# Patient Record
Sex: FEMALE | Race: BLACK OR AFRICAN AMERICAN | NOT HISPANIC OR LATINO | Employment: UNEMPLOYED | URBAN - METROPOLITAN AREA
[De-identification: names, ages, dates, MRNs, and addresses within clinical notes are randomized per-mention and may not be internally consistent; named-entity substitution may affect disease eponyms.]

---

## 2018-05-10 ENCOUNTER — HOSPITAL ENCOUNTER (EMERGENCY)
Facility: HOSPITAL | Age: 33
Discharge: HOME | End: 2018-05-10
Attending: EMERGENCY MEDICINE

## 2018-05-10 VITALS
TEMPERATURE: 98 F | HEIGHT: 66 IN | DIASTOLIC BLOOD PRESSURE: 83 MMHG | WEIGHT: 150 LBS | HEART RATE: 56 BPM | RESPIRATION RATE: 16 BRPM | BODY MASS INDEX: 24.11 KG/M2 | OXYGEN SATURATION: 100 % | SYSTOLIC BLOOD PRESSURE: 128 MMHG

## 2018-05-10 DIAGNOSIS — G44.209 ACUTE NON INTRACTABLE TENSION-TYPE HEADACHE: Primary | ICD-10-CM

## 2018-05-10 LAB
B-HCG UR QL: NEGATIVE
BACTERIA #/AREA URNS HPF: ABNORMAL /HPF
BILIRUB UR QL STRIP: ABNORMAL
CLARITY UR: CLEAR
COLOR UR: YELLOW
GLUCOSE UR QL STRIP: NEGATIVE
HGB UR QL STRIP: NEGATIVE
HYALINE CASTS #/AREA URNS LPF: 0 /LPF
KETONES UR QL STRIP: ABNORMAL
LEUKOCYTE ESTERASE UR QL STRIP: NEGATIVE
MICROSCOPIC COMMENT: ABNORMAL
NITRITE UR QL STRIP: NEGATIVE
PH UR STRIP: 7 [PH] (ref 5–8)
PROT UR QL STRIP: ABNORMAL
RBC #/AREA URNS HPF: 1 /HPF (ref 0–4)
SP GR UR STRIP: 1.02 (ref 1–1.03)
SQUAMOUS #/AREA URNS HPF: 20 /HPF
URN SPEC COLLECT METH UR: ABNORMAL
UROBILINOGEN UR STRIP-ACNC: NEGATIVE EU/DL
WBC #/AREA URNS HPF: 2 /HPF (ref 0–5)

## 2018-05-10 PROCEDURE — 96375 TX/PRO/DX INJ NEW DRUG ADDON: CPT

## 2018-05-10 PROCEDURE — 99284 EMERGENCY DEPT VISIT MOD MDM: CPT | Mod: 25

## 2018-05-10 PROCEDURE — 81025 URINE PREGNANCY TEST: CPT

## 2018-05-10 PROCEDURE — 96374 THER/PROPH/DIAG INJ IV PUSH: CPT

## 2018-05-10 PROCEDURE — 63600175 PHARM REV CODE 636 W HCPCS: Performed by: EMERGENCY MEDICINE

## 2018-05-10 PROCEDURE — 81000 URINALYSIS NONAUTO W/SCOPE: CPT

## 2018-05-10 RX ORDER — ONDANSETRON 2 MG/ML
4 INJECTION INTRAMUSCULAR; INTRAVENOUS
Status: DISCONTINUED | OUTPATIENT
Start: 2018-05-10 | End: 2018-05-10

## 2018-05-10 RX ORDER — DEXAMETHASONE SODIUM PHOSPHATE 100 MG/10ML
10 INJECTION INTRAMUSCULAR; INTRAVENOUS
Status: COMPLETED | OUTPATIENT
Start: 2018-05-10 | End: 2018-05-10

## 2018-05-10 RX ORDER — KETOROLAC TROMETHAMINE 30 MG/ML
30 INJECTION, SOLUTION INTRAMUSCULAR; INTRAVENOUS
Status: COMPLETED | OUTPATIENT
Start: 2018-05-10 | End: 2018-05-10

## 2018-05-10 RX ORDER — PROMETHAZINE HYDROCHLORIDE 25 MG/1
25 TABLET ORAL EVERY 6 HOURS PRN
Qty: 8 TABLET | Refills: 0 | Status: ON HOLD | OUTPATIENT
Start: 2018-05-10 | End: 2019-10-17 | Stop reason: CLARIF

## 2018-05-10 RX ORDER — NAPROXEN 500 MG/1
500 TABLET ORAL 2 TIMES DAILY WITH MEALS
Qty: 12 TABLET | Refills: 0 | Status: ON HOLD | OUTPATIENT
Start: 2018-05-10 | End: 2019-10-17 | Stop reason: CLARIF

## 2018-05-10 RX ORDER — METHOCARBAMOL 500 MG/1
1000 TABLET, FILM COATED ORAL 4 TIMES DAILY
Qty: 12 TABLET | Refills: 0 | Status: SHIPPED | OUTPATIENT
Start: 2018-05-10 | End: 2018-05-13

## 2018-05-10 RX ADMIN — DEXAMETHASONE SODIUM PHOSPHATE 10 MG: 10 INJECTION INTRAMUSCULAR; INTRAVENOUS at 09:05

## 2018-05-10 RX ADMIN — KETOROLAC TROMETHAMINE 30 MG: 30 INJECTION, SOLUTION INTRAMUSCULAR; INTRAVENOUS at 09:05

## 2018-05-11 NOTE — ED TRIAGE NOTES
Pt reports migraine and vomiting began 2-3 hours ago. Pt denies ever having a headache or migraine before.

## 2018-05-11 NOTE — DISCHARGE INSTRUCTIONS
Neck rehab  Naprosyn 500 one twice daily with meals  Robaxin 500 take two every 6 hours  Phenergan for nausea  Follow up PCP as needed.

## 2018-05-11 NOTE — ED NOTES
Pt c/o migraine and vomiting that began 2-3 hours ago. Pt denies any history of migraines/headaches or any recent injury. Pt reports last vomiting episode PTA. Pt updated on plan of care. Warm blankets provided. No needs voiced at this time. Will continue to monitor.

## 2018-05-11 NOTE — ED NOTES
Pt stated she does not have an allergy to Zofran, but that she was told by another physician that she has an adverse reaction to the medication and it makes her vomit. Pt aware a urine sample is needed.

## 2018-05-12 NOTE — ED PROVIDER NOTES
Encounter Date: 5/10/2018       History     Chief Complaint   Patient presents with    Migraine    Vomiting     33 y female complains of acute headache >24 hours with assoc NV  No change in strength sensation mentation or gait  No Va or Vf changes  No fever  No neck stiffness but neck pain present at occiput.           Review of patient's allergies indicates:  No Known Allergies  History reviewed. No pertinent past medical history.  Past Surgical History:   Procedure Laterality Date     SECTION       History reviewed. No pertinent family history.  Social History   Substance Use Topics    Smoking status: Former Smoker    Smokeless tobacco: Never Used    Alcohol use No     Review of Systems   Constitutional: Negative.    HENT: Negative for facial swelling and sore throat.    Eyes: Negative for photophobia and visual disturbance.   Respiratory: Negative.    Cardiovascular: Negative.    Gastrointestinal: Negative.    Genitourinary: Negative for dysuria.   Musculoskeletal: Positive for neck pain. Negative for arthralgias, myalgias and neck stiffness.   Neurological: Positive for headaches. Negative for dizziness, tremors, seizures, syncope, facial asymmetry, speech difficulty, weakness, light-headedness and numbness.   Hematological: Negative.    Psychiatric/Behavioral: Negative for confusion.   All other systems reviewed and are negative.      Physical Exam     Initial Vitals [05/10/18 2041]   BP Pulse Resp Temp SpO2   129/87 70 18 98.4 °F (36.9 °C) 100 %      MAP       101         Physical Exam    Nursing note and vitals reviewed.  Constitutional: She appears well-developed and well-nourished. She is not diaphoretic. No distress.   HENT:   Head: Normocephalic and atraumatic.   Nose: Nose normal.   Mouth/Throat: Oropharynx is clear and moist. No oropharyngeal exudate.   Eyes: Conjunctivae and EOM are normal. Pupils are equal, round, and reactive to light. Right eye exhibits no discharge. Left eye exhibits  no discharge. No scleral icterus.   Neck: Normal range of motion. Neck supple. Muscular tenderness present. No edema, no erythema and normal range of motion present. No neck rigidity.       Cardiovascular: Normal rate, regular rhythm, normal heart sounds and intact distal pulses. Exam reveals no gallop and no friction rub.    No murmur heard.  Pulmonary/Chest: Breath sounds normal. No respiratory distress. She has no wheezes. She has no rhonchi. She has no rales.   Abdominal: Soft. Bowel sounds are normal. She exhibits no distension and no mass. There is no tenderness. There is no rebound and no guarding.   Musculoskeletal: Normal range of motion. She exhibits no edema or tenderness.   Lymphadenopathy:     She has no cervical adenopathy.   Neurological: She is alert and oriented to person, place, and time. She has normal strength. No cranial nerve deficit or sensory deficit.   Skin: Skin is warm and dry. Capillary refill takes less than 2 seconds. No rash noted. No erythema. No pallor.   Psychiatric: She has a normal mood and affect. Her behavior is normal. Judgment and thought content normal.         ED Course   Procedures  Labs Reviewed   URINALYSIS, REFLEX TO URINE CULTURE - Abnormal; Notable for the following:        Result Value    Protein, UA 1+ (*)     Ketones, UA 1+ (*)     Bilirubin (UA) 1+ (*)     All other components within normal limits    Narrative:     Preferred Collection Type->Urine, Clean Catch   URINALYSIS MICROSCOPIC - Abnormal; Notable for the following:     Bacteria, UA Few (*)     All other components within normal limits    Narrative:     Preferred Collection Type->Urine, Clean Catch   PREGNANCY TEST, URINE RAPID             Medical Decision Making:   Acute headache with no life threat characteristics and most consistent with tension  Improved with Toradol and Decadron  Stable to DC per AVS                       Clinical Impression:   The encounter diagnosis was Acute non intractable  tension-type headache.    Disposition:   Disposition: Discharged  Condition: Stable                        Roger Martinez MD  05/11/18 1953

## 2019-10-17 ENCOUNTER — HOSPITAL ENCOUNTER (INPATIENT)
Facility: HOSPITAL | Age: 34
LOS: 4 days | Discharge: PSYCHIATRIC OR SUBSTANCE USE FACILITY | End: 2019-10-21
Attending: HOSPITALIST | Admitting: HOSPITALIST
Payer: MEDICAID

## 2019-10-17 ENCOUNTER — HOSPITAL ENCOUNTER (EMERGENCY)
Facility: HOSPITAL | Age: 34
Discharge: OTHER INSTITUTION | End: 2019-10-17
Attending: EMERGENCY MEDICINE
Payer: MEDICAID

## 2019-10-17 VITALS
SYSTOLIC BLOOD PRESSURE: 144 MMHG | WEIGHT: 192 LBS | TEMPERATURE: 98 F | RESPIRATION RATE: 14 BRPM | DIASTOLIC BLOOD PRESSURE: 96 MMHG | HEART RATE: 111 BPM | HEIGHT: 66 IN | OXYGEN SATURATION: 100 % | BODY MASS INDEX: 30.86 KG/M2

## 2019-10-17 DIAGNOSIS — F15.10 AMPHETAMINE ABUSE: ICD-10-CM

## 2019-10-17 DIAGNOSIS — R00.0 TACHYCARDIA: ICD-10-CM

## 2019-10-17 DIAGNOSIS — R74.8 ELEVATED CPK: ICD-10-CM

## 2019-10-17 DIAGNOSIS — R44.0 AUDITORY HALLUCINATIONS: ICD-10-CM

## 2019-10-17 DIAGNOSIS — N17.9 ACUTE RENAL FAILURE, UNSPECIFIED ACUTE RENAL FAILURE TYPE: Primary | ICD-10-CM

## 2019-10-17 DIAGNOSIS — R44.1 VISUAL HALLUCINATIONS: ICD-10-CM

## 2019-10-17 DIAGNOSIS — N17.9 AKI (ACUTE KIDNEY INJURY): Primary | ICD-10-CM

## 2019-10-17 DIAGNOSIS — E87.6 HYPOKALEMIA: ICD-10-CM

## 2019-10-17 DIAGNOSIS — F11.10 OPIATE ABUSE, CONTINUOUS: ICD-10-CM

## 2019-10-17 LAB
ALBUMIN SERPL BCP-MCNC: 5.5 G/DL (ref 3.5–5.2)
ALP SERPL-CCNC: 88 U/L (ref 55–135)
ALT SERPL W/O P-5'-P-CCNC: 53 U/L (ref 10–44)
AMMONIA PLAS-SCNC: 19 UMOL/L (ref 10–50)
AMPHET+METHAMPHET UR QL: ABNORMAL
ANION GAP SERPL CALC-SCNC: 14 MMOL/L (ref 8–16)
ANION GAP SERPL CALC-SCNC: 22 MMOL/L (ref 8–16)
APAP SERPL-MCNC: <10 UG/ML (ref 10–20)
AST SERPL-CCNC: 50 U/L (ref 10–40)
B-HCG UR QL: NEGATIVE
BACTERIA #/AREA URNS HPF: ABNORMAL /HPF
BARBITURATES UR QL SCN>200 NG/ML: NEGATIVE
BASOPHILS # BLD AUTO: 0.02 K/UL (ref 0–0.2)
BASOPHILS NFR BLD: 0.2 % (ref 0–1.9)
BENZODIAZ UR QL SCN>200 NG/ML: NEGATIVE
BILIRUB SERPL-MCNC: 0.6 MG/DL (ref 0.1–1)
BILIRUB UR QL STRIP: ABNORMAL
BUN SERPL-MCNC: 21 MG/DL (ref 6–20)
BUN SERPL-MCNC: 26 MG/DL (ref 6–20)
BZE UR QL SCN: NEGATIVE
CALCIUM SERPL-MCNC: 9.6 MG/DL (ref 8.7–10.5)
CALCIUM SERPL-MCNC: 9.8 MG/DL (ref 8.7–10.5)
CANNABINOIDS UR QL SCN: NEGATIVE
CHLORIDE SERPL-SCNC: 105 MMOL/L (ref 95–110)
CHLORIDE SERPL-SCNC: 98 MMOL/L (ref 95–110)
CK SERPL-CCNC: 1017 U/L (ref 20–180)
CK SERPL-CCNC: 1171 U/L (ref 20–180)
CLARITY UR: ABNORMAL
CO2 SERPL-SCNC: 16 MMOL/L (ref 23–29)
CO2 SERPL-SCNC: 21 MMOL/L (ref 23–29)
COLOR UR: YELLOW
CREAT SERPL-MCNC: 1.8 MG/DL (ref 0.5–1.4)
CREAT SERPL-MCNC: 4.8 MG/DL (ref 0.5–1.4)
CREAT UR-MCNC: >400 MG/DL (ref 15–325)
DIFFERENTIAL METHOD: ABNORMAL
EOSINOPHIL # BLD AUTO: 0 K/UL (ref 0–0.5)
EOSINOPHIL NFR BLD: 0 % (ref 0–8)
ERYTHROCYTE [DISTWIDTH] IN BLOOD BY AUTOMATED COUNT: 11.9 % (ref 11.5–14.5)
EST. GFR  (AFRICAN AMERICAN): 12.6 ML/MIN/1.73 M^2
EST. GFR  (AFRICAN AMERICAN): 41 ML/MIN/1.73 M^2
EST. GFR  (NON AFRICAN AMERICAN): 10.9 ML/MIN/1.73 M^2
EST. GFR  (NON AFRICAN AMERICAN): 36 ML/MIN/1.73 M^2
ETHANOL SERPL-MCNC: <5 MG/DL
GLUCOSE SERPL-MCNC: 97 MG/DL (ref 70–110)
GLUCOSE SERPL-MCNC: 99 MG/DL (ref 70–110)
GLUCOSE UR QL STRIP: NEGATIVE
HCT VFR BLD AUTO: 43.2 % (ref 37–48.5)
HGB BLD-MCNC: 15.4 G/DL (ref 12–16)
HGB UR QL STRIP: ABNORMAL
HYALINE CASTS #/AREA URNS LPF: 0 /LPF
IMM GRANULOCYTES # BLD AUTO: 0.05 K/UL (ref 0–0.04)
IMM GRANULOCYTES NFR BLD AUTO: 0.4 % (ref 0–0.5)
KETONES UR QL STRIP: ABNORMAL
LEUKOCYTE ESTERASE UR QL STRIP: ABNORMAL
LYMPHOCYTES # BLD AUTO: 2.6 K/UL (ref 1–4.8)
LYMPHOCYTES NFR BLD: 20.9 % (ref 18–48)
MAGNESIUM SERPL-MCNC: 2 MG/DL (ref 1.6–2.6)
MCH RBC QN AUTO: 32.8 PG (ref 27–31)
MCHC RBC AUTO-ENTMCNC: 35.6 G/DL (ref 32–36)
MCV RBC AUTO: 92 FL (ref 82–98)
MICROSCOPIC COMMENT: ABNORMAL
MONOCYTES # BLD AUTO: 0.8 K/UL (ref 0.3–1)
MONOCYTES NFR BLD: 6.7 % (ref 4–15)
NEUTROPHILS # BLD AUTO: 8.8 K/UL (ref 1.8–7.7)
NEUTROPHILS NFR BLD: 71.8 % (ref 38–73)
NITRITE UR QL STRIP: NEGATIVE
NRBC BLD-RTO: 0 /100 WBC
OPIATES UR QL SCN: ABNORMAL
PCP UR QL SCN>25 NG/ML: NEGATIVE
PH UR STRIP: 6 [PH] (ref 5–8)
PLATELET # BLD AUTO: 207 K/UL (ref 150–350)
PMV BLD AUTO: 11.5 FL (ref 9.2–12.9)
POTASSIUM SERPL-SCNC: 2.7 MMOL/L (ref 3.5–5.1)
POTASSIUM SERPL-SCNC: 2.9 MMOL/L (ref 3.5–5.1)
PROT SERPL-MCNC: 10.2 G/DL (ref 6–8.4)
PROT UR QL STRIP: ABNORMAL
RBC # BLD AUTO: 4.69 M/UL (ref 4–5.4)
RBC #/AREA URNS HPF: 4 /HPF (ref 0–4)
SALICYLATES SERPL-MCNC: <4 MG/DL (ref 15–30)
SODIUM SERPL-SCNC: 136 MMOL/L (ref 136–145)
SODIUM SERPL-SCNC: 140 MMOL/L (ref 136–145)
SP GR UR STRIP: 1.02 (ref 1–1.03)
TOXICOLOGY INFORMATION: ABNORMAL
TRICHOMONAS UR QL MICRO: ABNORMAL
TSH SERPL DL<=0.005 MIU/L-ACNC: 0.4 UIU/ML (ref 0.34–5.6)
URATE SERPL-MCNC: 8.5 MG/DL (ref 2.4–5.7)
URN SPEC COLLECT METH UR: ABNORMAL
UROBILINOGEN UR STRIP-ACNC: NEGATIVE EU/DL
WBC # BLD AUTO: 14.72 K/UL (ref 3.9–12.7)
WBC #/AREA URNS HPF: 20 /HPF (ref 0–5)

## 2019-10-17 PROCEDURE — 63600175 PHARM REV CODE 636 W HCPCS: Performed by: EMERGENCY MEDICINE

## 2019-10-17 PROCEDURE — 25000003 PHARM REV CODE 250: Performed by: EMERGENCY MEDICINE

## 2019-10-17 PROCEDURE — 20000000 HC ICU ROOM

## 2019-10-17 PROCEDURE — 36415 COLL VENOUS BLD VENIPUNCTURE: CPT

## 2019-10-17 PROCEDURE — 80329 ANALGESICS NON-OPIOID 1 OR 2: CPT

## 2019-10-17 PROCEDURE — 93005 ELECTROCARDIOGRAM TRACING: CPT

## 2019-10-17 PROCEDURE — 63600175 PHARM REV CODE 636 W HCPCS: Performed by: HOSPITALIST

## 2019-10-17 PROCEDURE — 96374 THER/PROPH/DIAG INJ IV PUSH: CPT

## 2019-10-17 PROCEDURE — 86038 ANTINUCLEAR ANTIBODIES: CPT

## 2019-10-17 PROCEDURE — 25000003 PHARM REV CODE 250: Performed by: HOSPITALIST

## 2019-10-17 PROCEDURE — 70450 CT HEAD WITHOUT CONTRAST: ICD-10-PCS | Mod: 26,,, | Performed by: RADIOLOGY

## 2019-10-17 PROCEDURE — 80048 BASIC METABOLIC PNL TOTAL CA: CPT

## 2019-10-17 PROCEDURE — 82550 ASSAY OF CK (CPK): CPT

## 2019-10-17 PROCEDURE — 84550 ASSAY OF BLOOD/URIC ACID: CPT

## 2019-10-17 PROCEDURE — 74176 CT ABD & PELVIS W/O CONTRAST: CPT | Mod: TC

## 2019-10-17 PROCEDURE — 70450 CT HEAD/BRAIN W/O DYE: CPT | Mod: TC

## 2019-10-17 PROCEDURE — 63600175 PHARM REV CODE 636 W HCPCS: Performed by: NURSE PRACTITIONER

## 2019-10-17 PROCEDURE — 80307 DRUG TEST PRSMV CHEM ANLYZR: CPT

## 2019-10-17 PROCEDURE — 74176 CT ABD & PELVIS W/O CONTRAST: CPT | Mod: 26,,, | Performed by: RADIOLOGY

## 2019-10-17 PROCEDURE — 87086 URINE CULTURE/COLONY COUNT: CPT

## 2019-10-17 PROCEDURE — 86160 COMPLEMENT ANTIGEN: CPT | Mod: 59

## 2019-10-17 PROCEDURE — 99285 EMERGENCY DEPT VISIT HI MDM: CPT | Mod: 25

## 2019-10-17 PROCEDURE — 82550 ASSAY OF CK (CPK): CPT | Mod: 91

## 2019-10-17 PROCEDURE — 85025 COMPLETE CBC W/AUTO DIFF WBC: CPT

## 2019-10-17 PROCEDURE — 80320 DRUG SCREEN QUANTALCOHOLS: CPT

## 2019-10-17 PROCEDURE — 81000 URINALYSIS NONAUTO W/SCOPE: CPT | Mod: 59

## 2019-10-17 PROCEDURE — 86160 COMPLEMENT ANTIGEN: CPT

## 2019-10-17 PROCEDURE — 80053 COMPREHEN METABOLIC PANEL: CPT

## 2019-10-17 PROCEDURE — 83735 ASSAY OF MAGNESIUM: CPT

## 2019-10-17 PROCEDURE — 74176 CT ABDOMEN PELVIS WITHOUT CONTRAST: ICD-10-PCS | Mod: 26,,, | Performed by: RADIOLOGY

## 2019-10-17 PROCEDURE — 70450 CT HEAD/BRAIN W/O DYE: CPT | Mod: 26,,, | Performed by: RADIOLOGY

## 2019-10-17 PROCEDURE — 82140 ASSAY OF AMMONIA: CPT

## 2019-10-17 PROCEDURE — 84443 ASSAY THYROID STIM HORMONE: CPT

## 2019-10-17 PROCEDURE — 96361 HYDRATE IV INFUSION ADD-ON: CPT

## 2019-10-17 PROCEDURE — 25000003 PHARM REV CODE 250: Performed by: INTERNAL MEDICINE

## 2019-10-17 PROCEDURE — 81025 URINE PREGNANCY TEST: CPT

## 2019-10-17 RX ORDER — ACETAMINOPHEN 500 MG
1000 TABLET ORAL EVERY 8 HOURS PRN
Status: DISCONTINUED | OUTPATIENT
Start: 2019-10-17 | End: 2019-10-22 | Stop reason: HOSPADM

## 2019-10-17 RX ORDER — SODIUM BICARBONATE 1 MEQ/ML
50 SYRINGE (ML) INTRAVENOUS
Status: COMPLETED | OUTPATIENT
Start: 2019-10-17 | End: 2019-10-17

## 2019-10-17 RX ORDER — IPRATROPIUM BROMIDE AND ALBUTEROL SULFATE 2.5; .5 MG/3ML; MG/3ML
3 SOLUTION RESPIRATORY (INHALATION) EVERY 6 HOURS PRN
Status: DISCONTINUED | OUTPATIENT
Start: 2019-10-17 | End: 2019-10-22 | Stop reason: HOSPADM

## 2019-10-17 RX ORDER — POTASSIUM CHLORIDE 20 MEQ/1
40 TABLET, EXTENDED RELEASE ORAL ONCE
Status: COMPLETED | OUTPATIENT
Start: 2019-10-17 | End: 2019-10-17

## 2019-10-17 RX ORDER — AMOXICILLIN 250 MG
1 CAPSULE ORAL 2 TIMES DAILY
Status: DISCONTINUED | OUTPATIENT
Start: 2019-10-17 | End: 2019-10-22 | Stop reason: HOSPADM

## 2019-10-17 RX ORDER — ONDANSETRON 2 MG/ML
4 INJECTION INTRAMUSCULAR; INTRAVENOUS EVERY 8 HOURS PRN
Status: DISCONTINUED | OUTPATIENT
Start: 2019-10-17 | End: 2019-10-22 | Stop reason: HOSPADM

## 2019-10-17 RX ORDER — HALOPERIDOL 5 MG/ML
5 INJECTION INTRAMUSCULAR EVERY 6 HOURS PRN
Status: DISCONTINUED | OUTPATIENT
Start: 2019-10-17 | End: 2019-10-19

## 2019-10-17 RX ORDER — INDOMETHACIN 25 MG/1
CAPSULE ORAL
Status: DISCONTINUED
Start: 2019-10-17 | End: 2019-10-17 | Stop reason: HOSPADM

## 2019-10-17 RX ORDER — IBUPROFEN 200 MG
24 TABLET ORAL
Status: DISCONTINUED | OUTPATIENT
Start: 2019-10-17 | End: 2019-10-22 | Stop reason: HOSPADM

## 2019-10-17 RX ORDER — POTASSIUM CHLORIDE 20 MEQ/1
40 TABLET, EXTENDED RELEASE ORAL
Status: COMPLETED | OUTPATIENT
Start: 2019-10-17 | End: 2019-10-17

## 2019-10-17 RX ORDER — GLUCAGON 1 MG
1 KIT INJECTION
Status: DISCONTINUED | OUTPATIENT
Start: 2019-10-17 | End: 2019-10-22 | Stop reason: HOSPADM

## 2019-10-17 RX ORDER — IBUPROFEN 200 MG
16 TABLET ORAL
Status: DISCONTINUED | OUTPATIENT
Start: 2019-10-17 | End: 2019-10-22 | Stop reason: HOSPADM

## 2019-10-17 RX ADMIN — HALOPERIDOL LACTATE 5 MG: 5 INJECTION INTRAMUSCULAR at 10:10

## 2019-10-17 RX ADMIN — SODIUM CHLORIDE 1000 ML: 0.9 INJECTION, SOLUTION INTRAVENOUS at 09:10

## 2019-10-17 RX ADMIN — SODIUM BICARBONATE 50 MEQ: 84 INJECTION, SOLUTION INTRAVENOUS at 11:10

## 2019-10-17 RX ADMIN — POTASSIUM CHLORIDE 40 MEQ: 1500 TABLET, EXTENDED RELEASE ORAL at 10:10

## 2019-10-17 RX ADMIN — SODIUM CHLORIDE 1000 ML: 0.9 INJECTION, SOLUTION INTRAVENOUS at 11:10

## 2019-10-17 RX ADMIN — SODIUM BICARBONATE: 84 INJECTION, SOLUTION INTRAVENOUS at 09:10

## 2019-10-17 RX ADMIN — POTASSIUM CHLORIDE 40 MEQ: 1500 TABLET, EXTENDED RELEASE ORAL at 09:10

## 2019-10-17 NOTE — ED NOTES
"Initial assessment complete. Pt presents with reported visual and auditory hallucinations onset last night. Pt tearful on arrival and reports "no one believes me." Denies pain. Tachycardic on EKG monitor. Tearful and cooperative.   "

## 2019-10-17 NOTE — ED TRIAGE NOTES
"Pt reports that she was talking to a man in her room. Advised pt was alone. Pt tearful on arrival and reports "I have issues but never anything like this."   "

## 2019-10-17 NOTE — ED NOTES
Advised by EDP that pt will no longer be considered a 1:1 and will likely be admitted secondary to chemistry results but it is no longer deemed necessary to keep pt on observation in ED.

## 2019-10-17 NOTE — ED NOTES
Dr Trujillo spoke with Dr Moore about transfer. Will advise when an accepting facility is located.

## 2019-10-17 NOTE — ED NOTES
Call from transfer center. Pt accepted at [facility] room 512. Advised they will arrange transport. Pt updated on POC. Verbalized understanding.

## 2019-10-17 NOTE — PLAN OF CARE
(Physician in Lead of Transfers)   Outside Transfer Acceptance Note / Regional Referral Center    Transferring Physician: Ronna Moore MD    Accepting Physician: Kade Clark MD    Date of Acceptance: 10/17/19    Transferring Facility: [facility]    Destination Facility and Admitting Physician: [facility], Dr. Sheeder    Reason for Transfer: HLOC, needs nephrology    Report from Transferring Physician/Hospital course:     Mr. Kiran is a 34-year-old woman with no known medical history though she has several ED presentations for substance abuse complicated by hallucinations who presented to [facility] with confusion.    From Dr. Barriga description, obtaining a history from her is difficult due to her mental status. She is alert, but confused, and reporting both auditory and visual hallucinations that were thought to have started around 0800 Wednesday morning. This constellation of symptoms is reportedly similar to previous polysubstance-abuse episodes, though I cant see these in Epic.     In [place] she was tachycardic to the 120s, lab workup shows leukocytosis to 14.72k, hypokalemia to 2.7, anion-gap metabolic acidosis with bicarb 16 and gap 22, and JULIEN with BUN/sCr 26/4.8. CPK 1171. She thinks she may have taken heroin, methamphetamine, cocaine, or some mixture of the three. They have ordered urine studies, but she has not made any urine. She was given 2L NS, an amp of bicarb, and potassium chloride. She was placed on a 72h hold and admission was requested for JULIEN, but they do not have nephrology and are concerned she may need HD.    Of note, Dr. Moore has ordered a CT A/P non-con to evaluate her JULIEN, and I requested that while shes down there have a CT head as well.    VS:     See Epic    Labs:     See Epic    Radiographs:     See Epic    To Do List: Admit to     Upon patient arrival to floor, please contact Hospital Medicine on call.     Kade Clark M.D.   (Physician in Lead of  Transfers)  [facility]  [phone] (pager)

## 2019-10-17 NOTE — ED PROVIDER NOTES
"Encounter Date: 10/17/2019       History     Chief Complaint   Patient presents with    Hallucinations     35yo female with pmh cannabis/spice abuse presents to ED via EMS for evaluation of auditory and visual hallucinations. EMS states the patient called 911 frantic because "there are people in my house trying to get me. My sister and boyfriend do this stuff, I tried it, but they were going to attack me. My daughter let him in even though I said he couldn't come in. My momma doesn't want him there." She arrives tearful, hyperverbal, and speaking to a man (that isn't present). Voices "I may have done ice, heroin, cocaine."    Unable to obtain further history due to patient's medical condition.         Review of patient's allergies indicates:  No Known Allergies  History reviewed. No pertinent past medical history.  Past Surgical History:   Procedure Laterality Date     SECTION       History reviewed. No pertinent family history.  Social History     Tobacco Use    Smoking status: Former Smoker    Smokeless tobacco: Never Used   Substance Use Topics    Alcohol use: Not Currently    Drug use: Not Currently     Types: Marijuana, Methamphetamines     Review of Systems   Unable to perform ROS: Psychiatric disorder   Psychiatric/Behavioral: Positive for agitation and hallucinations.       Physical Exam     Initial Vitals [10/17/19 0837]   BP Pulse Resp Temp SpO2   (!) 130/90 (!) 129 18 98.3 °F (36.8 °C) 100 %      MAP       --         Physical Exam    Nursing note and vitals reviewed.  Constitutional: She appears well-developed and well-nourished. She is not diaphoretic. She appears distressed.   HENT:   Head: Normocephalic and atraumatic.   Right Ear: External ear normal.   Left Ear: External ear normal.   Nose: Nose normal.   Mouth/Throat: Oropharynx is clear and moist.   Eyes: Conjunctivae are normal. Pupils are equal, round, and reactive to light. No scleral icterus.   Neck: Normal range of motion. Neck " supple.   Cardiovascular: Regular rhythm, normal heart sounds and intact distal pulses. Tachycardia present.    Pulmonary/Chest: Breath sounds normal. No respiratory distress. She has no wheezes. She has no rhonchi. She exhibits no tenderness.   Abdominal: Soft. Bowel sounds are normal. She exhibits no distension. There is no tenderness. There is no guarding.   Musculoskeletal: Normal range of motion. She exhibits no edema or tenderness.   Lymphadenopathy:     She has no cervical adenopathy.   Neurological: She is alert and oriented to person, place, and time. GCS score is 15. GCS eye subscore is 4. GCS verbal subscore is 5. GCS motor subscore is 6.   Skin: Skin is warm and dry. Capillary refill takes less than 2 seconds. No rash noted. No erythema. No pallor.   Psychiatric: Her mood appears anxious. Her affect is labile and inappropriate. Her speech is rapid and/or pressured and tangential. She is agitated and hyperactive. Thought content is paranoid and delusional. Cognition and memory are impaired. She expresses impulsivity and inappropriate judgment.         ED Course   Critical Care  Date/Time: 10/17/2019 8:30 AM  Performed by: Ronna Moore MD  Authorized by: Ronna Moore MD   Direct patient critical care time: 145 minutes  Additional history critical care time: 20 minutes  Ordering / reviewing critical care time: 30 minutes  Documentation critical care time: 20 minutes  Consulting other physicians critical care time: 15 minutes  Total critical care time (exclusive of procedural time) : 230 minutes  Critical care time was exclusive of separately billable procedures and treating other patients and teaching time.  Critical care was necessary to treat or prevent imminent or life-threatening deterioration of the following conditions: renal failure, toxidrome and dehydration.  Critical care was time spent personally by me on the following activities: development of treatment plan with patient or surrogate,  interpretation of cardiac output measurements, examination of patient, ordering and performing treatments and interventions, ordering and review of radiographic studies, re-evaluation of patient's condition, review of old charts, discussions with consultants, evaluation of patient's response to treatment, obtaining history from patient or surrogate, ordering and review of laboratory studies and pulse oximetry.        Labs Reviewed   CBC W/ AUTO DIFFERENTIAL - Abnormal; Notable for the following components:       Result Value    WBC 14.72 (*)     Mean Corpuscular Hemoglobin 32.8 (*)     Gran # (ANC) 8.8 (*)     Immature Grans (Abs) 0.05 (*)     All other components within normal limits   COMPREHENSIVE METABOLIC PANEL - Abnormal; Notable for the following components:    Potassium 2.7 (*)     CO2 16 (*)     BUN, Bld 26 (*)     Creatinine 4.8 (*)     Total Protein 10.2 (*)     Albumin 5.5 (*)     AST 50 (*)     ALT 53 (*)     Anion Gap 22 (*)     eGFR if  12.6 (*)     eGFR if non  10.9 (*)     All other components within normal limits    Narrative:     Potassium critical result(s) called and verbal readback obtained from   JULIA Shukla, 10/17/2019 09:34   URINALYSIS, REFLEX TO URINE CULTURE - Abnormal; Notable for the following components:    Appearance, UA Cloudy (*)     Protein, UA 2+ (*)     Ketones, UA 1+ (*)     Bilirubin (UA) 2+ (*)     Occult Blood UA 2+ (*)     Leukocytes, UA 1+ (*)     All other components within normal limits    Narrative:     Preferred Collection Type->Urine, Clean Catch   DRUG SCREEN PANEL, URINE EMERGENCY - Abnormal; Notable for the following components:    Creatinine, Random Ur >400.0 (*)     All other components within normal limits    Narrative:     Preferred Collection Type->Urine, Clean Catch   SALICYLATE LEVEL - Abnormal; Notable for the following components:    Salicylate Lvl <4.0 (*)     All other components within normal limits   CK - Abnormal;  Notable for the following components:    CPK 1171 (*)     All other components within normal limits   URINALYSIS MICROSCOPIC - Abnormal; Notable for the following components:    WBC, UA 20 (*)     Bacteria Moderate (*)     Trichomonas, UA Few (*)     All other components within normal limits    Narrative:     Preferred Collection Type->Urine, Clean Catch   CULTURE, URINE   TSH   ALCOHOL,MEDICAL (ETHANOL)   ACETAMINOPHEN LEVEL   PREGNANCY TEST, URINE RAPID    Narrative:     Recoll. [id number] by SORAYA at 10/17/2019 11:49, reason: No Specimen   Received  Recoll. [id number] by DARRYL at 10/17/2019 12:09, reason: NO SPECIMEN   REC IN LAB   [facility]IA   CK     EKG Readings: (Independently Interpreted)   Initial Reading: No STEMI. Rhythm: Sinus Tachycardia. Heart Rate: 103. Ectopy: No Ectopy. Conduction: Normal. ST Segments: Normal ST Segments. T Waves: Normal. Axis: Normal. Clinical Impression: Sinus Tachycardia     ECG Results          EKG 12-lead (In process)  Result time 10/17/19 13:34:53    In process by Interface, Lab In [facility] (10/17/19 13:34:53)                 Narrative:    Test Reason : R00.0,    Vent. Rate : 103 BPM     Atrial Rate : 103 BPM     P-R Int : 128 ms          QRS Dur : 082 ms      QT Int : 358 ms       P-R-T Axes : 068 072 013 degrees     QTc Int : 468 ms    Sinus tachycardia  Otherwise normal ECG  No previous ECGs available    Referred By: [email or web address]   SELF           Confirmed By:                   In process by Interface, Lab In [facility] (10/17/19 13:31:52)                 Narrative:    Test Reason : R00.0,    Vent. Rate : 103 BPM     Atrial Rate : 103 BPM     P-R Int : 128 ms          QRS Dur : 082 ms      QT Int : 358 ms       P-R-T Axes : 068 072 013 degrees     QTc Int : 468 ms    Sinus tachycardia  Otherwise normal ECG  No previous ECGs available    Referred By: [email or web address]   SELF           Confirmed By:                             Imaging Results          CT Abdomen Pelvis  Without Contrast (Final  result)  Result time 10/17/19 13:53:18    Final result by Primitivo Alicea MD (10/17/19 13:53:18)                 Impression:      1. Previous cholecystectomy with mild extrahepatic biliary ductal dilatation.  2. No renal calculi.  3. Fluid filled but mild dilated loops of small bowel.  This can be seen with gastroenteritis.  4. Mild lumbar levoscoliosis.      Electronically signed by: Primitivo Alicea  Date:    10/17/2019  Time:    13:53             Narrative:    EXAMINATION:  CT ABDOMEN PELVIS WITHOUT CONTRAST    CLINICAL HISTORY:  Abd pain, fever, abscess suspected;    TECHNIQUE:  Low dose axial images, sagittal and coronal reformations were obtained from the lung bases to the pubic symphysis.    COMPARISON:  CT 01/30/2009.    FINDINGS:  The lung bases are clear.  No pleural or pericardial effusions.    The liver and spleen are normal in size and attenuation.  Surgical clips from previous cholecystectomy.  Mild extrahepatic biliary ductal dilatation.  The pancreas and adrenal glands are unremarkable.    Kidneys are normal in size and attenuation.  No renal calculi.  No changes of hydronephrosis.  No perinephric inflammatory change.    Air and stool throughout the colon.  No mesenteric inflammatory change.  There are fluid filled but nondilated loops of mid to distal small bowel.  This can be seen with gastroenteritis.  The appendix is normal in caliber.    Bladder is partially distended.  Uterus and ovaries are normal in size and attenuation.  Small amount of free fluid in cul-de-sac.  This is likely physiologic in etiology.    No significant mesenteric or retroperitoneal lymphadenopathy.    Mild lumbar levoscoliosis.                               CT Head Without Contrast (Final result)  Result time 10/17/19 13:46:36    Final result by Primitivo Alicea MD (10/17/19 13:46:36)                 Impression:      No acute intracranial abnormality.      Electronically signed by: Primitivo  Orange  Date:    10/17/2019  Time:    13:46             Narrative:    EXAMINATION:  CT HEAD WITHOUT CONTRAST    CLINICAL HISTORY:  Confusion/delirium, altered LOC, unexplained;    TECHNIQUE:  Low dose axial images were obtained through the head.  Coronal and sagittal reformations were also performed. Contrast was not administered.    COMPARISON:  CT 09/09/2013.    FINDINGS:  There is no acute hemorrhage or infarction.  Normal pattern of gray-white matter differentiation.    No extra-axial fluid collections.  Ventricles are normal in size, shape and configuration.  The basal cisterns are patent.    The imaged paranasal sinuses and ethmoid air cells are well aerated.    The mastoid air cells and middle ears are normally pneumatized.                              X-Rays:   Independently Interpreted Readings:   Head CT: No hemorrhage.  No skull fracture.  No acute stroke.     Medical Decision Making:   Differential Diagnosis:   Polysubstance abuse, acute renal failure, acute intracranial hemorrhage, metabolic encephalopathy, rhabdomyolysis, intoxication, acute psychosis  ED Management:  Pt in acute renal failure. IVFs and bicarb administered with very minimal urine yielded.   [facility] contacted to arrange transfer for nephrology services. Accepted at [facility], awaiting bed assignment.   Pt transferred to [facility] at 1959.                       Clinical Impression:       ICD-10-CM ICD-9-CM   1. Acute renal failure, unspecified acute renal failure type N17.9 584.9   2. Tachycardia R00.0 785.0   3. Elevated CPK R74.8 790.5   4. Hypokalemia E87.6 276.8   5. Auditory hallucinations R44.0 780.1   6. Visual hallucinations R44.1 368.16   7. Amphetamine abuse F15.10 305.70   8. Opiate abuse, continuous F11.10 305.51         Disposition:   Disposition: Transferred  Condition: Serious                        Ronna Moore MD  10/18/19 9802

## 2019-10-17 NOTE — ED NOTES
Pt complete approximately 75% of her breakfast tray. Her only request is to speak with her mother at this time.

## 2019-10-18 PROBLEM — F19.10 POLYSUBSTANCE ABUSE: Status: ACTIVE | Noted: 2019-10-18

## 2019-10-18 PROBLEM — E87.6 HYPOKALEMIA: Status: ACTIVE | Noted: 2019-10-18

## 2019-10-18 PROBLEM — F19.951 HALLUCINATION, DRUG-INDUCED: Status: ACTIVE | Noted: 2019-10-18

## 2019-10-18 PROBLEM — E87.20 METABOLIC ACIDOSIS: Status: ACTIVE | Noted: 2019-10-18

## 2019-10-18 PROBLEM — M62.82 NON-TRAUMATIC RHABDOMYOLYSIS: Status: ACTIVE | Noted: 2019-10-18

## 2019-10-18 PROBLEM — E83.39 HYPOPHOSPHATEMIA: Status: ACTIVE | Noted: 2019-10-18

## 2019-10-18 PROBLEM — K52.9 GASTROENTERITIS: Status: ACTIVE | Noted: 2019-10-18

## 2019-10-18 LAB
ANA SER QL IF: NORMAL
ANION GAP SERPL CALC-SCNC: 15 MMOL/L (ref 8–16)
BACTERIA #/AREA URNS HPF: ABNORMAL /HPF
BACTERIA UR CULT: NORMAL
BACTERIA UR CULT: NORMAL
BASOPHILS # BLD AUTO: 0.02 K/UL (ref 0–0.2)
BASOPHILS NFR BLD: 0.2 % (ref 0–1.9)
BILIRUB UR QL STRIP: NEGATIVE
BUN SERPL-MCNC: 16 MG/DL (ref 6–20)
C3 SERPL-MCNC: 161 MG/DL (ref 50–180)
C4 SERPL-MCNC: 42 MG/DL (ref 11–44)
CALCIUM SERPL-MCNC: 9.3 MG/DL (ref 8.7–10.5)
CHLORIDE SERPL-SCNC: 104 MMOL/L (ref 95–110)
CK SERPL-CCNC: 799 U/L (ref 20–180)
CLARITY UR: ABNORMAL
CO2 SERPL-SCNC: 21 MMOL/L (ref 23–29)
COLOR UR: YELLOW
CREAT SERPL-MCNC: 1.1 MG/DL (ref 0.5–1.4)
DIFFERENTIAL METHOD: ABNORMAL
EOSINOPHIL # BLD AUTO: 0 K/UL (ref 0–0.5)
EOSINOPHIL NFR BLD: 0.2 % (ref 0–8)
ERYTHROCYTE [DISTWIDTH] IN BLOOD BY AUTOMATED COUNT: 12.1 % (ref 11.5–14.5)
EST. GFR  (AFRICAN AMERICAN): >60 ML/MIN/1.73 M^2
EST. GFR  (NON AFRICAN AMERICAN): >60 ML/MIN/1.73 M^2
GLUCOSE SERPL-MCNC: 101 MG/DL (ref 70–110)
GLUCOSE UR QL STRIP: NEGATIVE
HCT VFR BLD AUTO: 33.7 % (ref 37–48.5)
HGB BLD-MCNC: 11.6 G/DL (ref 12–16)
HGB UR QL STRIP: ABNORMAL
HYALINE CASTS #/AREA URNS LPF: 0 /LPF
IMM GRANULOCYTES # BLD AUTO: 0.02 K/UL (ref 0–0.04)
KETONES UR QL STRIP: ABNORMAL
LEUKOCYTE ESTERASE UR QL STRIP: ABNORMAL
LYMPHOCYTES # BLD AUTO: 1.8 K/UL (ref 1–4.8)
LYMPHOCYTES NFR BLD: 19.6 % (ref 18–48)
MAGNESIUM SERPL-MCNC: 2 MG/DL (ref 1.6–2.6)
MCH RBC QN AUTO: 32.4 PG (ref 27–31)
MCHC RBC AUTO-ENTMCNC: 34.4 G/DL (ref 32–36)
MCV RBC AUTO: 94 FL (ref 82–98)
MICROSCOPIC COMMENT: ABNORMAL
MONOCYTES # BLD AUTO: 0.9 K/UL (ref 0.3–1)
MONOCYTES NFR BLD: 9.9 % (ref 4–15)
NEUTROPHILS # BLD AUTO: 6.4 K/UL (ref 1.8–7.7)
NEUTROPHILS NFR BLD: 69.9 % (ref 38–73)
NITRITE UR QL STRIP: NEGATIVE
NRBC BLD-RTO: 0 /100 WBC
PH UR STRIP: 7 [PH] (ref 5–8)
PHOSPHATE SERPL-MCNC: 1.9 MG/DL (ref 2.7–4.5)
PLATELET # BLD AUTO: 216 K/UL (ref 150–350)
PMV BLD AUTO: 11.4 FL (ref 9.2–12.9)
POTASSIUM SERPL-SCNC: 2.8 MMOL/L (ref 3.5–5.1)
PROT UR QL STRIP: ABNORMAL
RBC # BLD AUTO: 3.58 M/UL (ref 4–5.4)
RBC #/AREA URNS HPF: 3 /HPF (ref 0–4)
SODIUM SERPL-SCNC: 140 MMOL/L (ref 136–145)
SP GR UR STRIP: 1.02 (ref 1–1.03)
TRICHOMONAS UR QL MICRO: ABNORMAL
URN SPEC COLLECT METH UR: ABNORMAL
UROBILINOGEN UR STRIP-ACNC: NEGATIVE EU/DL
WBC # BLD AUTO: 9.22 K/UL (ref 3.9–12.7)
WBC #/AREA URNS HPF: 32 /HPF (ref 0–5)

## 2019-10-18 PROCEDURE — 80048 BASIC METABOLIC PNL TOTAL CA: CPT

## 2019-10-18 PROCEDURE — 85025 COMPLETE CBC W/AUTO DIFF WBC: CPT

## 2019-10-18 PROCEDURE — 87186 SC STD MICRODIL/AGAR DIL: CPT

## 2019-10-18 PROCEDURE — 94761 N-INVAS EAR/PLS OXIMETRY MLT: CPT

## 2019-10-18 PROCEDURE — 87077 CULTURE AEROBIC IDENTIFY: CPT

## 2019-10-18 PROCEDURE — 36415 COLL VENOUS BLD VENIPUNCTURE: CPT

## 2019-10-18 PROCEDURE — 63600175 PHARM REV CODE 636 W HCPCS: Performed by: HOSPITALIST

## 2019-10-18 PROCEDURE — 83735 ASSAY OF MAGNESIUM: CPT

## 2019-10-18 PROCEDURE — 25000003 PHARM REV CODE 250: Performed by: INTERNAL MEDICINE

## 2019-10-18 PROCEDURE — 84100 ASSAY OF PHOSPHORUS: CPT

## 2019-10-18 PROCEDURE — 25000003 PHARM REV CODE 250: Performed by: HOSPITALIST

## 2019-10-18 PROCEDURE — 87088 URINE BACTERIA CULTURE: CPT

## 2019-10-18 PROCEDURE — 87205 SMEAR GRAM STAIN: CPT

## 2019-10-18 PROCEDURE — 20000000 HC ICU ROOM

## 2019-10-18 PROCEDURE — 82550 ASSAY OF CK (CPK): CPT

## 2019-10-18 PROCEDURE — 81000 URINALYSIS NONAUTO W/SCOPE: CPT

## 2019-10-18 PROCEDURE — 63600175 PHARM REV CODE 636 W HCPCS: Performed by: NURSE PRACTITIONER

## 2019-10-18 PROCEDURE — 87086 URINE CULTURE/COLONY COUNT: CPT

## 2019-10-18 RX ORDER — SODIUM CHLORIDE 9 MG/ML
INJECTION, SOLUTION INTRAVENOUS CONTINUOUS
Status: DISCONTINUED | OUTPATIENT
Start: 2019-10-18 | End: 2019-10-19

## 2019-10-18 RX ORDER — POTASSIUM CHLORIDE 20 MEQ/15ML
40 SOLUTION ORAL ONCE
Status: COMPLETED | OUTPATIENT
Start: 2019-10-18 | End: 2019-10-18

## 2019-10-18 RX ORDER — SODIUM,POTASSIUM PHOSPHATES 280-250MG
1 POWDER IN PACKET (EA) ORAL 2 TIMES DAILY
Status: DISCONTINUED | OUTPATIENT
Start: 2019-10-18 | End: 2019-10-20

## 2019-10-18 RX ORDER — POTASSIUM CHLORIDE 20 MEQ/1
40 TABLET, EXTENDED RELEASE ORAL 2 TIMES DAILY
Status: DISCONTINUED | OUTPATIENT
Start: 2019-10-18 | End: 2019-10-22 | Stop reason: HOSPADM

## 2019-10-18 RX ADMIN — POTASSIUM CHLORIDE 40 MEQ: 20 TABLET, EXTENDED RELEASE ORAL at 06:10

## 2019-10-18 RX ADMIN — SODIUM BICARBONATE: 84 INJECTION, SOLUTION INTRAVENOUS at 08:10

## 2019-10-18 RX ADMIN — SODIUM CHLORIDE: 0.9 INJECTION, SOLUTION INTRAVENOUS at 06:10

## 2019-10-18 RX ADMIN — HALOPERIDOL LACTATE 5 MG: 5 INJECTION INTRAMUSCULAR at 08:10

## 2019-10-18 RX ADMIN — HALOPERIDOL LACTATE 5 MG: 5 INJECTION INTRAMUSCULAR at 07:10

## 2019-10-18 RX ADMIN — SODIUM CHLORIDE: 0.9 INJECTION, SOLUTION INTRAVENOUS at 10:10

## 2019-10-18 RX ADMIN — POTASSIUM & SODIUM PHOSPHATES POWDER PACK 280-160-250 MG 1 PACKET: 280-160-250 PACK at 12:10

## 2019-10-18 RX ADMIN — POTASSIUM CHLORIDE 40 MEQ: 20 SOLUTION ORAL at 07:10

## 2019-10-18 RX ADMIN — POTASSIUM & SODIUM PHOSPHATES POWDER PACK 280-160-250 MG 1 PACKET: 280-160-250 PACK at 08:10

## 2019-10-18 NOTE — ASSESSMENT & PLAN NOTE
Hx cannabis/spice abuse with admit hx of Ice, cocaine and heroin.  Discussed th deleterious effects of substance abuse.

## 2019-10-18 NOTE — ASSESSMENT & PLAN NOTE
Continue IVF hydration.  Follow renal panel and electrolytes closely.  Follow renal recommendations.  Check Renal Ultrasound.  Adjust renal dose medications for Estimated Creatinine Clearance: 46.5 mL/min (A) (based on SCr of 1.8 mg/dL (H)).  Avoid NSAIDs, Reddy-II inhibitors, ACE-I, Angiotensin Receptor Blockers, or Aminoglycosides.  Urine analysis.

## 2019-10-18 NOTE — CONSULTS
Nephrology Consult Note        Patient Name: Eleno Kiran  MRN: [id number]    Patient Class: IP- Inpatient   Admission Date: 10/17/2019  Length of Stay: 1 days  Date of Service: 10/18/2019    Attending Physician: Manda Guadarrama MD  Primary Care Provider: Gerhard Sequeira III, MD    Reason for Consult: julien/anemia/acidosis/hypokalemia/encephalopathy/hypophosphatemia    SUBJECTIVE:     HPI: 34F transferred from [facility] with anemia, JULIEN with oliguria, hypokalemia, acidosis requiring bicarb gtt, in setting of overdose with heroin, cocaine, amphetamines. Continues to have visual and auditory hallucinations. Renal consulted for co-management.    History reviewed. No pertinent past medical history.  Past Surgical History:   Procedure Laterality Date     SECTION       Family History   Family history unknown: Yes     Social History     Tobacco Use    Smoking status: Former Smoker    Smokeless tobacco: Never Used   Substance Use Topics    Alcohol use: Not Currently    Drug use: Yes     Types: Marijuana, Methamphetamines, Amphetamines       Review of patient's allergies indicates:  No Known Allergies    Outpatient meds:  Current Facility-Administered Medications on File Prior to Encounter   Medication Dose Route Frequency Provider Last Rate Last Dose    [COMPLETED] sodium chloride 0.9% bolus 1,000 mL  1,000 mL Intravenous ED 1 Time Ronna Moore MD   Stopped at 10/17/19 1941    [DISCONTINUED] sodium bicarbonate 1 mEq/mL (8.4 %) solution              No current outpatient medications on file prior to encounter.       Scheduled meds:   potassium, sodium phosphates  1 packet Oral BID    senna-docusate 8.6-50 mg  1 tablet Oral BID       Infusions:   sodium chloride 0.9% 150 mL/hr at 10/18/19 1035       PRN meds:  acetaminophen, albuterol-ipratropium, dextrose 50%, dextrose 50%, glucagon (human recombinant), glucose, glucose, haloperidol lactate, ondansetron, promethazine (PHENERGAN) IVPB    Review of  Systems:  Review of Systems   Unable to perform ROS: Acuity of condition       OBJECTIVE:     Vital Signs and IO (Last 24H):  Temp:  [98 °F (36.7 °C)-98.6 °F (37 °C)]   Pulse:  []   Resp:  [14-84]   BP: ()/(59-97)   SpO2:  [90 %-100 %]   I/O last 3 completed shifts:  In: 786.7 [I.V.:786.7]  Out: 200 [Urine:200]    Wt Readings from Last 5 Encounters:   10/17/19 81.2 kg (179 lb 0.2 oz)   10/17/19 87.1 kg (192 lb)   10/17/19 87.1 kg (192 lb 0.3 oz)   05/10/18 68 kg (150 lb)   11/09/16 70.3 kg (155 lb)         Physical Exam:  Physical Exam   Constitutional: She appears well-developed and well-nourished.   HENT:   Head: Normocephalic and atraumatic.   Mouth/Throat: Oropharynx is clear and moist.   Eyes: Pupils are equal, round, and reactive to light. EOM are normal. No scleral icterus.   Neck: Neck supple.   Cardiovascular: Normal rate and regular rhythm.   Pulmonary/Chest: Effort normal. No stridor. No respiratory distress.   Abdominal: Soft. She exhibits no distension.   Musculoskeletal: Normal range of motion. She exhibits no edema or deformity.   Neurological: She is alert. No cranial nerve deficit.   Skin: Skin is warm and dry. No rash noted. She is not diaphoretic. No erythema.   Psychiatric: She has a normal mood and affect. Her behavior is normal.       Body mass index is 28.89 kg/m².    Laboratory:  Recent Labs   Lab 10/17/19  0902 10/17/19  2115 10/18/19  0410    140 140   K 2.7* 2.9* 2.8*   CL 98 105 104   CO2 16* 21* 21*   BUN 26* 21* 16   CREATININE 4.8* 1.8* 1.1   ESTGFRAFRICA 12.6* 41* >60   EGFRNONAA 10.9* 36* >60   GLU 99 97 101       Recent Labs   Lab 10/17/19  0902 10/17/19  2115 10/18/19  0410   CALCIUM 9.8 9.6 9.3   ALBUMIN 5.5*  --   --    PHOS  --   --  1.9*   MG  --  2.0 2.0             No results for input(s): POCTGLUCOSE in the last 168 hours.          Recent Labs   Lab 10/17/19  0902 10/18/19  0410   WBC 14.72* 9.22   HGB 15.4 11.6*   HCT 43.2 33.7*    216   MCV 92 94    MCHC 35.6 34.4   MONO 6.7  0.8 9.9  0.9       Recent Labs   Lab 10/17/19  0902   BILITOT 0.6   PROT 10.2*   ALBUMIN 5.5*   ALKPHOS 88   ALT 53*   AST 50*       Recent Labs   Lab 05/10/18  2227 10/17/19  1110   Color, UA Yellow Yellow   Appearance, UA Clear Cloudy A   pH, UA 7.0 6.0   Specific [place], UA 1.025 1.025   Protein, UA 1+ A 2+ A   Glucose, UA Negative Negative   Ketones, UA 1+ A 1+ A   Urobilinogen, UA Negative Negative   Bilirubin (UA) 1+ A 2+ A   Occult Blood UA Negative 2+ A   Nitrite, UA Negative Negative   RBC, UA 1 4   WBC, UA 2 20 H   Bacteria Few A Moderate A   Hyaline Casts, UA 0 0             Microbiology Results (last 7 days)     ** No results found for the last 168 hours. **          ASSESSMENT/PLAN:     Active Hospital Problems    Diagnosis  POA    *JULIEN (acute kidney injury) [N17.9]  Yes    Metabolic acidosis [E87.2]  Yes    Hallucination, drug-induced [F19.951]  Yes    Polysubstance abuse [F19.10]  Yes    Non-traumatic rhabdomyolysis [M62.82]  Yes      Resolved Hospital Problems   No resolved problems to display.     JULIEN, likely hemodynamic, better with IVF.  CKD stage 3, baseline sCr < 1.  Acidosis, metabolic, better.  Hypokalemia, better but still present.  Encephalopathy, hallucinations.  Hypophosphatemia.  No NSAIDs, ACEI/ARB, IV contrast or other nephrotoxins.  Keep MAP > 60, SBP > 100.  Continue oral KCL.  Continue oral Phos supplement.  Stop bicarb gtt and monitor.    Anemia  Hgb and HCT are acceptable. Monitor.    Thank you for allowing us to participate in the care of your patient!   We will follow the patient and provide recommendations as needed.    Pee Jesus MD    [facility]  [address] [place] [address]  ELLE Jay [address]    [phone] - tel  [phone] - fax    10/18/2019 4:48 PM

## 2019-10-18 NOTE — HPI
Eleno Kiran is a 35 y/o F who was transferred from [facility] to [facility] with a diagnosis of acute kidney injury.  Pt is alert, but confused, and reporting both auditory and visual hallucinations that were thought to have started around 0800 Wednesday morning after use of ice, heroin and/or cocaine.  Pt was noted to have metabolic acidosis with an anion gap of 22.  She was placed on a bicarb drip but has failed to produce urine.  She was sent to [facility] for the service of nephrology.  Further history is difficult to obtain as pt remains confused and continues to have hallucinations.

## 2019-10-18 NOTE — NURSING
Spoke with Dr Jesus via phone. He is aware of the patient. No further orders except for magnesium lab added. Will monitor at this time.

## 2019-10-18 NOTE — H&P
[facility] Hospital Medicine  History & Physical    Patient Name: Eleno Kiran  MRN: [id number]  Admission Date: 10/17/2019  Attending Physician: Manda Guadarrama MD   Primary Care Provider: Gerhard Sequeira III, MD         Patient information was obtained from patient, past medical records and ER records.     Subjective:     Principal Problem:JULIEN (acute kidney injury)    Chief Complaint: No chief complaint on file.       HPI: Eleno Kiran is a 33 y/o F who was transferred from [facility] to [facility] with a diagnosis of acute kidney injury.  Pt is alert, but confused, and reporting both auditory and visual hallucinations that were thought to have started around 0800 Wednesday morning after use of ice, heroin and/or cocaine.  Pt was noted to have metabolic acidosis with an anion gap of 22.  She was placed on a bicarb drip but has failed to produce urine.  She was sent to [facility] for the service of nephrology.  Further history is difficult to obtain as pt remains confused and continues to have hallucinations.    History reviewed. No pertinent past medical history.    Past Surgical History:   Procedure Laterality Date     SECTION         Review of patient's allergies indicates:  No Known Allergies    Current Facility-Administered Medications on File Prior to Encounter   Medication    [COMPLETED] potassium chloride SA CR tablet 40 mEq    [COMPLETED] sodium bicarbonate 8.4 % (1 mEq/mL) injection 50 mEq    [COMPLETED] sodium chloride 0.9% bolus 1,000 mL    [COMPLETED] sodium chloride 0.9% bolus 1,000 mL    [DISCONTINUED] sodium bicarbonate 1 mEq/mL (8.4 %) solution     No current outpatient medications on file prior to encounter.     Family History     Family history is unknown by patient.        Tobacco Use    Smoking status: Former Smoker    Smokeless tobacco: Never Used   Substance and Sexual Activity    Alcohol use: Not Currently    Drug use: Yes     Types:  Marijuana, Methamphetamines, Amphetamines    Sexual activity: Not Currently     Review of Systems   Unable to perform ROS: Mental status change     Objective:     Vital Signs (Most Recent):  Temp: 98.3 °F (36.8 °C) (10/17/19 2343)  Pulse: (!) 116 (10/17/19 2343)  Resp: (!) 31 (10/17/19 2343)  BP: 124/69 (10/17/19 2300)  SpO2: 100 % (10/17/19 2343) Vital Signs (24h Range):  Temp:  [98.2 °F (36.8 °C)-98.6 °F (37 °C)] 98.3 °F (36.8 °C)  Pulse:  [103-129] 116  Resp:  [14-70] 31  SpO2:  [100 %] 100 %  BP: (119-155)/() 124/69     Weight: 81.2 kg (179 lb 0.2 oz)  Body mass index is 28.89 kg/m².    Physical Exam   Constitutional: She appears well-developed and well-nourished.   HENT:   Head: Normocephalic and atraumatic.   Eyes: Pupils are equal, round, and reactive to light. Conjunctivae and EOM are normal.   Neck: Normal range of motion. Neck supple. No JVD present.   Cardiovascular: Regular rhythm. Tachycardia present.   Pulmonary/Chest: Effort normal and breath sounds normal. No respiratory distress.   Abdominal: Soft. Bowel sounds are normal. She exhibits no distension. There is no tenderness.   Genitourinary:   Genitourinary Comments: Deferred     Musculoskeletal: Normal range of motion. She exhibits no edema.   Neurological: She is alert. GCS eye subscore is 4. GCS verbal subscore is 4. GCS motor subscore is 6.   Skin: Skin is warm and dry. No rash noted.   Psychiatric: Her mood appears anxious. Her affect is labile. Her speech is rapid and/or pressured. She is agitated and hyperactive. Cognition and memory are impaired. She expresses impulsivity.   Nursing note and vitals reviewed.        CRANIAL NERVES     CN III, IV, VI   Pupils are equal, round, and reactive to light.  Extraocular motions are normal.        Significant Labs:   CBC:   Recent Labs   Lab 10/17/19  0902   WBC 14.72*   HGB 15.4   HCT 43.2        CMP:   Recent Labs   Lab 10/17/19  0902 10/17/19  2115    140   K 2.7* 2.9*   CL 98 105    CO2 16* 21*   GLU 99 97   BUN 26* 21*   CREATININE 4.8* 1.8*   CALCIUM 9.8 9.6   PROT 10.2*  --    ALBUMIN 5.5*  --    BILITOT 0.6  --    ALKPHOS 88  --    AST 50*  --    ALT 53*  --    ANIONGAP 22* 14   EGFRNONAA 10.9* 36*     Cardiac Markers: No results for input(s): CKMB, MYOGLOBIN, BNP, TROPISTAT in the last 48 hours.    Significant Imaging: CT head:  no intracranial abnormality   CT abdomen/pelvis:  1. Previous cholecystectomy with mild extrahepatic biliary ductal dilatation.  2. No renal calculi.  3. Fluid filled but mild dilated loops of small bowel.  This can be seen with gastroenteritis.  4. Mild lumbar levoscoliosis.    Assessment/Plan:     * JULIEN (acute kidney injury)  Continue IVF hydration.  Follow renal panel and electrolytes closely.  Follow renal recommendations.  Check Renal Ultrasound.  Adjust renal dose medications for Estimated Creatinine Clearance: 46.5 mL/min (A) (based on SCr of 1.8 mg/dL (H)).  Avoid NSAIDs, Reddy-II inhibitors, ACE-I, Angiotensin Receptor Blockers, or Aminoglycosides.  Urine analysis.                Non-traumatic rhabdomyolysis  IVF  Check CPK in am      Polysubstance abuse  Hx cannabis/spice abuse with admit hx of Ice, cocaine and heroin.  Discussed th deleterious effects of substance abuse.        Hallucination, drug-induced  Pt currently PEC'd.  Auditory and visual hallucinations following possible use of ICE, heroin and cocaine.  Monitor for s/s of withdrawal.        Metabolic acidosis  anion-gap metabolic acidosis with bicarb 16 and gap 22, placed on bicarb drip and having minimal urine output.  Nephrology consulted.  Monitor I&O's  BMP, Mg, phos, cbc daily.        VTE Risk Mitigation (From admission, onward)         Ordered     IP VTE HIGH RISK PATIENT  Once      10/17/19 2054     Place ANNALISE hose  Until discontinued      10/17/19 2054     Place sequential compression device  Until discontinued      10/17/19 2054              Critical care time spent on the evaluation and  treatment of severe organ dysfunction, review of pertinent labs and imaging studies, discussions with consulting providers and discussions with patient/family: 40  minutes.     SHIRA Bourne  Department of Hospital Medicine   [facility]

## 2019-10-18 NOTE — ASSESSMENT & PLAN NOTE
anion-gap metabolic acidosis with bicarb 16 and gap 22, placed on bicarb drip and having minimal urine output.  Nephrology consulted.  Monitor I&O's  BMP, Mg, phos, cbc daily.

## 2019-10-18 NOTE — PLAN OF CARE
Remained at bedside 1:1. Patient continues to have visual and auditory hallucinations. PRN Haldol helping somewhat. Labs monitored, potassium replaced per orders. Bicarb gtt. Voided once. Inadvertently removed IV and new IV started. Safety maintained.
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
## 2019-10-18 NOTE — ED NOTES
Called placed to [facility]. Spoke with Zohra. [facility] set up transfer as a priority 3. Transfer changed to priority two.

## 2019-10-18 NOTE — NURSING
Called Hanh Roberts NP to notify of patient's arrival and of need for PEC to be filled out. Informed me that she cannot fill out PEC, contacted ER and spoke with Dr Mayberry regarding PEC.

## 2019-10-18 NOTE — ASSESSMENT & PLAN NOTE
Pt currently PEC'd.  Auditory and visual hallucinations following possible use of ICE, heroin and cocaine.  Monitor for s/s of withdrawal.

## 2019-10-18 NOTE — SUBJECTIVE & OBJECTIVE
Interval History:   Patient seen examined.  She is awake.  She had some agitation which improved with Haldol.  She reports depression and prior hospitalizations x5 in psychiatric facilities.  She denies current SI/HI      Review of Systems   Constitutional: Negative for chills and fever.   Respiratory: Negative for cough and chest tightness.    Cardiovascular: Negative for chest pain and leg swelling.   Gastrointestinal: Negative for abdominal pain.   Genitourinary: Negative for difficulty urinating and dysuria.   Musculoskeletal: Negative for arthralgias and back pain.   Psychiatric/Behavioral: Positive for behavioral problems and hallucinations. Negative for suicidal ideas. The patient is nervous/anxious.      Objective:     Vital Signs (Most Recent):  Temp: 98 °F (36.7 °C) (10/18/19 1200)  Pulse: 84 (10/18/19 1542)  Resp: (!) 32 (10/18/19 1542)  BP: 115/71 (10/18/19 1400)  SpO2: 95 % (10/18/19 1542) Vital Signs (24h Range):  Temp:  [98 °F (36.7 °C)-98.6 °F (37 °C)] 98 °F (36.7 °C)  Pulse:  [] 84  Resp:  [14-84] 32  SpO2:  [90 %-100 %] 95 %  BP: ()/(59-97) 115/71     Weight: 81.2 kg (179 lb 0.2 oz)  Body mass index is 28.89 kg/m².    Intake/Output Summary (Last 24 hours) at 10/18/2019 1754  Last data filed at 10/18/2019 1200  Gross per 24 hour   Intake 1506.67 ml   Output 600 ml   Net 906.67 ml      Physical Exam   Constitutional: She is oriented to person, place, and time. She appears well-developed and well-nourished. No distress.   HENT:   Head: Normocephalic and atraumatic.   Nose: Nose normal.   Mouth/Throat: Oropharynx is clear and moist.   Eyes: Conjunctivae are normal. No scleral icterus.   Neck: Neck supple. No JVD present.   Cardiovascular: Normal rate and regular rhythm.   Pulmonary/Chest: Effort normal and breath sounds normal. No respiratory distress.   Abdominal: Soft. Bowel sounds are normal. She exhibits no distension. There is no tenderness.   Genitourinary:   Genitourinary Comments:  deferred     Musculoskeletal: Normal range of motion. She exhibits no edema.   Lymphadenopathy:     She has no cervical adenopathy.   Neurological: She is alert and oriented to person, place, and time. GCS eye subscore is 4. GCS verbal subscore is 4. GCS motor subscore is 6.   Skin: Skin is warm and dry. Capillary refill takes less than 2 seconds. No rash noted.   Psychiatric: Her mood appears anxious. Her affect is not labile. Her speech is rapid and/or pressured. She is not agitated and not hyperactive. Thought content is paranoid. Cognition and memory are impaired. She expresses impulsivity. She exhibits a depressed mood. She expresses no homicidal and no suicidal ideation. She expresses no suicidal plans and no homicidal plans.   Nursing note and vitals reviewed.      Significant Labs:   BMP:   Recent Labs   Lab 10/18/19  0410         K 2.8*      CO2 21*   BUN 16   CREATININE 1.1   CALCIUM 9.3   MG 2.0     CBC:   Recent Labs   Lab 10/17/19  0902 10/18/19  0410   WBC 14.72* 9.22   HGB 15.4 11.6*   HCT 43.2 33.7*    216       Significant Imaging: I have reviewed and interpreted all pertinent imaging results/findings within the past 24 hours.   1. Previous cholecystectomy with mild extrahepatic biliary ductal dilatation.  2. No renal calculi.  3. Fluid filled but mild dilated loops of small bowel.  This can be seen with gastroenteritis.  4. Mild lumbar levoscoliosis.

## 2019-10-18 NOTE — PLAN OF CARE
"CM called pt's mother Nyla ([phone]) to complete discharge assessment due to pt unable to do at this time (pt having auditory/visually hallucinations). Nyla confirmed information on facesheet as correct. Nyla states that pt bounces between her listed address and her mother's address ([address]  in [place]). Nyla reports that pt is normally independent with all ADLs and can drive herself to appts. PCP is Dr. Sequeira. Pharm is [facility] in [place]. Denies any hh/hd/dme. Pt's mother is requesting pysh placement because this isn't pt's first "episode". CM explained to Nyla that we have to wait on psychiatrist recommendations. Nyla verbalized understanding. CM following to assist in any DC needs.        10/18/19 1126   Discharge Assessment   Assessment Type Discharge Planning Assessment   Assessment information obtained from? Other  (mother (Nyla [phone]))   Communicated expected length of stay with patient/caregiver yes   Prior to hospitilization cognitive status: Alert/Oriented   Prior to hospitalization functional status: Independent   Current cognitive status: Inappropriate Behavior   Current Functional Status: Needs Assistance   Lives With parent(s)   Able to Return to Prior Arrangements no   Is patient able to care for self after discharge? Unable to determine at this time (comments)   Readmission Within the Last 30 Days no previous admission in last 30 days   Patient currently being followed by outpatient case management? No   Patient currently receives any other outside agency services? No   Equipment Currently Used at Home none   Do you have any problems affording any of your prescribed medications? No   Is the patient taking medications as prescribed? yes   Does the patient have transportation home? No   Does the patient receive services at the [facility]? No   Discharge Plan A Psychiatric hospital   Discharge Plan B Home   DME Needed Upon Discharge  none "   Patient/Family in Agreement with Plan yes

## 2019-10-18 NOTE — SUBJECTIVE & OBJECTIVE
History reviewed. No pertinent past medical history.    Past Surgical History:   Procedure Laterality Date     SECTION         Review of patient's allergies indicates:  No Known Allergies    Current Facility-Administered Medications on File Prior to Encounter   Medication    [COMPLETED] potassium chloride SA CR tablet 40 mEq    [COMPLETED] sodium bicarbonate 8.4 % (1 mEq/mL) injection 50 mEq    [COMPLETED] sodium chloride 0.9% bolus 1,000 mL    [COMPLETED] sodium chloride 0.9% bolus 1,000 mL    [DISCONTINUED] sodium bicarbonate 1 mEq/mL (8.4 %) solution     No current outpatient medications on file prior to encounter.     Family History     Family history is unknown by patient.        Tobacco Use    Smoking status: Former Smoker    Smokeless tobacco: Never Used   Substance and Sexual Activity    Alcohol use: Not Currently    Drug use: Yes     Types: Marijuana, Methamphetamines, Amphetamines    Sexual activity: Not Currently     Review of Systems   Unable to perform ROS: Mental status change     Objective:     Vital Signs (Most Recent):  Temp: 98.3 °F (36.8 °C) (10/17/19 2343)  Pulse: (!) 116 (10/17/19 2343)  Resp: (!) 31 (10/17/19 2343)  BP: 124/69 (10/17/19 2300)  SpO2: 100 % (10/17/19 2343) Vital Signs (24h Range):  Temp:  [98.2 °F (36.8 °C)-98.6 °F (37 °C)] 98.3 °F (36.8 °C)  Pulse:  [103-129] 116  Resp:  [14-70] 31  SpO2:  [100 %] 100 %  BP: (119-155)/() 124/69     Weight: 81.2 kg (179 lb 0.2 oz)  Body mass index is 28.89 kg/m².    Physical Exam   Constitutional: She appears well-developed and well-nourished.   HENT:   Head: Normocephalic and atraumatic.   Eyes: Pupils are equal, round, and reactive to light. Conjunctivae and EOM are normal.   Neck: Normal range of motion. Neck supple. No JVD present.   Cardiovascular: Regular rhythm. Tachycardia present.   Pulmonary/Chest: Effort normal and breath sounds normal. No respiratory distress.   Abdominal: Soft. Bowel sounds are normal. She  exhibits no distension. There is no tenderness.   Genitourinary:   Genitourinary Comments: Deferred     Musculoskeletal: Normal range of motion. She exhibits no edema.   Neurological: She is alert. GCS eye subscore is 4. GCS verbal subscore is 4. GCS motor subscore is 6.   Skin: Skin is warm and dry. No rash noted.   Psychiatric: Her mood appears anxious. Her affect is labile. Her speech is rapid and/or pressured. She is agitated and hyperactive. Cognition and memory are impaired. She expresses impulsivity.   Nursing note and vitals reviewed.        CRANIAL NERVES     CN III, IV, VI   Pupils are equal, round, and reactive to light.  Extraocular motions are normal.        Significant Labs:   CBC:   Recent Labs   Lab 10/17/19  0902   WBC 14.72*   HGB 15.4   HCT 43.2        CMP:   Recent Labs   Lab 10/17/19  0902 10/17/19  2115    140   K 2.7* 2.9*   CL 98 105   CO2 16* 21*   GLU 99 97   BUN 26* 21*   CREATININE 4.8* 1.8*   CALCIUM 9.8 9.6   PROT 10.2*  --    ALBUMIN 5.5*  --    BILITOT 0.6  --    ALKPHOS 88  --    AST 50*  --    ALT 53*  --    ANIONGAP 22* 14   EGFRNONAA 10.9* 36*     Cardiac Markers: No results for input(s): CKMB, MYOGLOBIN, BNP, TROPISTAT in the last 48 hours.    Significant Imaging: CT head:  no intracranial abnormality   CT abdomen/pelvis:  1. Previous cholecystectomy with mild extrahepatic biliary ductal dilatation.  2. No renal calculi.  3. Fluid filled but mild dilated loops of small bowel.  This can be seen with gastroenteritis.  4. Mild lumbar levoscoliosis.
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
## 2019-10-19 PROBLEM — A59.01 TRICHOMONAS VAGINITIS: Status: ACTIVE | Noted: 2019-10-19

## 2019-10-19 PROBLEM — G93.41 ENCEPHALOPATHY, METABOLIC: Status: ACTIVE | Noted: 2019-10-19

## 2019-10-19 LAB
ANION GAP SERPL CALC-SCNC: 9 MMOL/L (ref 8–16)
BASOPHILS # BLD AUTO: 0.02 K/UL (ref 0–0.2)
BASOPHILS NFR BLD: 0.3 % (ref 0–1.9)
BUN SERPL-MCNC: 7 MG/DL (ref 6–20)
CALCIUM SERPL-MCNC: 8.3 MG/DL (ref 8.7–10.5)
CHLORIDE SERPL-SCNC: 109 MMOL/L (ref 95–110)
CK SERPL-CCNC: 298 U/L (ref 20–180)
CO2 SERPL-SCNC: 24 MMOL/L (ref 23–29)
CREAT SERPL-MCNC: 0.7 MG/DL (ref 0.5–1.4)
DIFFERENTIAL METHOD: ABNORMAL
EOSINOPHIL # BLD AUTO: 0 K/UL (ref 0–0.5)
EOSINOPHIL NFR BLD: 0.5 % (ref 0–8)
EOSINOPHIL URNS QL WRIGHT STN: NORMAL
ERYTHROCYTE [DISTWIDTH] IN BLOOD BY AUTOMATED COUNT: 12.6 % (ref 11.5–14.5)
EST. GFR  (AFRICAN AMERICAN): >60 ML/MIN/1.73 M^2
EST. GFR  (NON AFRICAN AMERICAN): >60 ML/MIN/1.73 M^2
GLUCOSE SERPL-MCNC: 102 MG/DL (ref 70–110)
HCT VFR BLD AUTO: 28.8 % (ref 37–48.5)
HGB BLD-MCNC: 9.8 G/DL (ref 12–16)
IMM GRANULOCYTES # BLD AUTO: 0.02 K/UL (ref 0–0.04)
LYMPHOCYTES # BLD AUTO: 2.1 K/UL (ref 1–4.8)
LYMPHOCYTES NFR BLD: 28.7 % (ref 18–48)
MAGNESIUM SERPL-MCNC: 1.9 MG/DL (ref 1.6–2.6)
MCH RBC QN AUTO: 32.6 PG (ref 27–31)
MCHC RBC AUTO-ENTMCNC: 34 G/DL (ref 32–36)
MCV RBC AUTO: 96 FL (ref 82–98)
MONOCYTES # BLD AUTO: 0.8 K/UL (ref 0.3–1)
MONOCYTES NFR BLD: 10.6 % (ref 4–15)
NEUTROPHILS # BLD AUTO: 4.4 K/UL (ref 1.8–7.7)
NEUTROPHILS NFR BLD: 59.6 % (ref 38–73)
NRBC BLD-RTO: 0 /100 WBC
PHOSPHATE SERPL-MCNC: 1.2 MG/DL (ref 2.7–4.5)
PLATELET # BLD AUTO: 184 K/UL (ref 150–350)
PMV BLD AUTO: 11 FL (ref 9.2–12.9)
POTASSIUM SERPL-SCNC: 3.2 MMOL/L (ref 3.5–5.1)
RBC # BLD AUTO: 3.01 M/UL (ref 4–5.4)
SODIUM SERPL-SCNC: 142 MMOL/L (ref 136–145)
WBC # BLD AUTO: 7.39 K/UL (ref 3.9–12.7)

## 2019-10-19 PROCEDURE — 63600175 PHARM REV CODE 636 W HCPCS: Performed by: PSYCHIATRY & NEUROLOGY

## 2019-10-19 PROCEDURE — 25000003 PHARM REV CODE 250: Performed by: HOSPITALIST

## 2019-10-19 PROCEDURE — 36415 COLL VENOUS BLD VENIPUNCTURE: CPT

## 2019-10-19 PROCEDURE — 80048 BASIC METABOLIC PNL TOTAL CA: CPT

## 2019-10-19 PROCEDURE — 84100 ASSAY OF PHOSPHORUS: CPT

## 2019-10-19 PROCEDURE — 83735 ASSAY OF MAGNESIUM: CPT

## 2019-10-19 PROCEDURE — 94761 N-INVAS EAR/PLS OXIMETRY MLT: CPT

## 2019-10-19 PROCEDURE — 20000000 HC ICU ROOM

## 2019-10-19 PROCEDURE — 63600175 PHARM REV CODE 636 W HCPCS: Performed by: NURSE PRACTITIONER

## 2019-10-19 PROCEDURE — 25000003 PHARM REV CODE 250: Performed by: INTERNAL MEDICINE

## 2019-10-19 PROCEDURE — 85025 COMPLETE CBC W/AUTO DIFF WBC: CPT

## 2019-10-19 PROCEDURE — 82550 ASSAY OF CK (CPK): CPT

## 2019-10-19 PROCEDURE — 99900035 HC TECH TIME PER 15 MIN (STAT)

## 2019-10-19 RX ORDER — HALOPERIDOL 5 MG/ML
5 INJECTION INTRAMUSCULAR EVERY 8 HOURS
Status: DISCONTINUED | OUTPATIENT
Start: 2019-10-19 | End: 2019-10-22 | Stop reason: HOSPADM

## 2019-10-19 RX ORDER — CLONIDINE HYDROCHLORIDE 0.1 MG/1
0.1 TABLET ORAL EVERY 8 HOURS PRN
Status: DISCONTINUED | OUTPATIENT
Start: 2019-10-19 | End: 2019-10-22 | Stop reason: HOSPADM

## 2019-10-19 RX ORDER — HYDROCORTISONE 25 MG/G
CREAM TOPICAL 2 TIMES DAILY
Status: DISCONTINUED | OUTPATIENT
Start: 2019-10-19 | End: 2019-10-19

## 2019-10-19 RX ORDER — METRONIDAZOLE 500 MG/1
2000 TABLET ORAL ONCE
Status: COMPLETED | OUTPATIENT
Start: 2019-10-19 | End: 2019-10-19

## 2019-10-19 RX ORDER — HYDROCORTISONE 25 MG/G
CREAM TOPICAL 3 TIMES DAILY
Status: DISCONTINUED | OUTPATIENT
Start: 2019-10-19 | End: 2019-10-22 | Stop reason: HOSPADM

## 2019-10-19 RX ADMIN — FLUCONAZOLE 150 MG: 50 TABLET ORAL at 01:10

## 2019-10-19 RX ADMIN — HALOPERIDOL LACTATE 5 MG: 5 INJECTION INTRAMUSCULAR at 12:10

## 2019-10-19 RX ADMIN — POTASSIUM CHLORIDE 40 MEQ: 20 TABLET, EXTENDED RELEASE ORAL at 09:10

## 2019-10-19 RX ADMIN — POTASSIUM & SODIUM PHOSPHATES POWDER PACK 280-160-250 MG 1 PACKET: 280-160-250 PACK at 08:10

## 2019-10-19 RX ADMIN — HALOPERIDOL LACTATE 5 MG: 5 INJECTION, SOLUTION INTRAMUSCULAR at 09:10

## 2019-10-19 RX ADMIN — METRONIDAZOLE 2000 MG: 500 TABLET, FILM COATED ORAL at 09:10

## 2019-10-19 RX ADMIN — POTASSIUM & SODIUM PHOSPHATES POWDER PACK 280-160-250 MG 1 PACKET: 280-160-250 PACK at 09:10

## 2019-10-19 RX ADMIN — HYDROCORTISONE: 25 CREAM TOPICAL at 01:10

## 2019-10-19 RX ADMIN — POTASSIUM CHLORIDE 40 MEQ: 20 TABLET, EXTENDED RELEASE ORAL at 08:10

## 2019-10-19 NOTE — ASSESSMENT & PLAN NOTE
Pt currently PEC'd.  Auditory and visual hallucinations following possible use of ICE, heroin and cocaine.  Monitor for s/s of withdrawal.  Denies Si/HI  Psychiatry consulted --await eval   Hallucinations improved with  haldol prn   Pt reports hx depression/inpatient psychiatric treatment-

## 2019-10-19 NOTE — SUBJECTIVE & OBJECTIVE
Interval History:   Pt seen/examined-nurse present   No hallucinations today -had haldol once overnight  Depressed affect but pleasant   C/o vag itch/burn     Review of Systems   Constitutional: Negative for chills and fever.   Respiratory: Negative for cough and shortness of breath.    Gastrointestinal: Negative for abdominal pain, diarrhea, nausea and vomiting.   Genitourinary: Positive for dysuria and vaginal discharge.   Psychiatric/Behavioral: Positive for dysphoric mood. Negative for suicidal ideas.        Objective:     Vital Signs (Most Recent):  Temp: 98.3 °F (36.8 °C) (10/19/19 0805)  Pulse: 82 (10/19/19 0830)  Resp: 20 (10/19/19 0830)  BP: 121/74 (10/19/19 0805)  SpO2: 100 % (10/19/19 0830) Vital Signs (24h Range):  Temp:  [97.4 °F (36.3 °C)-98.3 °F (36.8 °C)] 98.3 °F (36.8 °C)  Pulse:  [] 82  Resp:  [18-76] 20  SpO2:  [72 %-100 %] 100 %  BP: (104-126)/(56-92) 121/74     Weight: 81.2 kg (179 lb 0.2 oz)  Body mass index is 28.89 kg/m².    Intake/Output Summary (Last 24 hours) at 10/19/2019 1111  Last data filed at 10/19/2019 0900  Gross per 24 hour   Intake 3712.5 ml   Output 400 ml   Net 3312.5 ml      Physical Exam   Constitutional: She is oriented to person, place, and time. She appears well-developed and well-nourished. No distress.   HENT:   Head: Normocephalic and atraumatic.   Nose: Nose normal.   Mouth/Throat: Oropharynx is clear and moist.   Eyes: Conjunctivae are normal. No scleral icterus.   Neck: Neck supple. No JVD present.   Cardiovascular: Normal rate and regular rhythm.   Pulmonary/Chest: Effort normal and breath sounds normal. No respiratory distress.   Abdominal: Soft. Bowel sounds are normal. She exhibits no distension. There is no tenderness.   Genitourinary:   Genitourinary Comments: deferred     Musculoskeletal: Normal range of motion. She exhibits no edema.   Lymphadenopathy:     She has no cervical adenopathy.   Neurological: She is alert and oriented to person, place, and  time. GCS eye subscore is 4. GCS verbal subscore is 4. GCS motor subscore is 6.   Skin: Skin is warm and dry. Capillary refill takes less than 2 seconds. No rash noted.   Psychiatric: Her mood appears not anxious. Her speech is not rapid and/or pressured. She is not agitated and not hyperactive. Thought content is paranoid. Cognition and memory are not impaired. She expresses impulsivity. She exhibits a depressed mood. She expresses no homicidal and no suicidal ideation. She expresses no suicidal plans and no homicidal plans.   Nursing note and vitals reviewed.      Significant Labs:   Urine Studies:   Recent Labs   Lab 10/18/19  1202   COLORU Yellow   APPEARANCEUA Hazy*   PHUR 7.0   SPECGRAV 1.025   PROTEINUA 1+*   GLUCUA Negative   KETONESU 1+*   BILIRUBINUA Negative   OCCULTUA 3+*   NITRITE Negative   UROBILINOGEN Negative   LEUKOCYTESUR 2+*   RBCUA 3   WBCUA 32*   BACTERIA Many*   HYALINECASTS 0      2d ago   Urine Culture, Routine Multiple organisms isolated. None in predominance.  Repeat if    Urine Culture, Routine clinically necessary.    Results for ELLIOTT CARTER (MRN [id number]) as of 10/19/2019 11:13   Ref. Range 10/18/2019 04:10 10/19/2019 03:54   Sodium Latest Ref Range: 136 - 145 mmol/L 140 142   Potassium Latest Ref Range: 3.5 - 5.1 mmol/L 2.8 (L) 3.2 (L)   Chloride Latest Ref Range: 95 - 110 mmol/L 104 109   CO2 Latest Ref Range: 23 - 29 mmol/L 21 (L) 24   Anion Gap Latest Ref Range: 8 - 16 mmol/L 15 9   BUN, Bld Latest Ref Range: 6 - 20 mg/dL 16 7   Creatinine Latest Ref Range: 0.5 - 1.4 mg/dL 1.1 0.7   eGFR if non African American Latest Ref Range: >60 mL/min/1.73 m^2 >60 >60   eGFR if  Latest Ref Range: >60 mL/min/1.73 m^2 >60 >60   Glucose Latest Ref Range: 70 - 110 mg/dL 101 102   Calcium Latest Ref Range: 8.7 - 10.5 mg/dL 9.3 8.3 (L)   Phosphorus Latest Ref Range: 2.7 - 4.5 mg/dL 1.9 (L) 1.2 (L)   Magnesium Latest Ref Range: 1.6 - 2.6 mg/dL 2.0 1.9   CPK Latest Ref  Range: 20 - 180 U/L 799 (H) 298 (H)       Significant Imaging: I have reviewed and interpreted all pertinent imaging results/findings within the past 24 hours.

## 2019-10-19 NOTE — ASSESSMENT & PLAN NOTE
Acute --resolved after bicarb gtt .fand ivf -likely 2/2 GE, dehydration, drugs and rhabdo  Follow renal panel and electrolytes closely.  Ctap-no obstructon .  Adjust renal dose medications for Estimated Creatinine Clearance: 119.4 mL/min (based on SCr of 0.7 mg/dL).  Avoid NSAIDs, Reddy-II inhibitors, ACE-I, Angiotensin Receptor Blockers, or Aminoglycosides.  Urine analysis.-mild proteinuria /wbc/trichomonas --treat std   Urine culture neg

## 2019-10-19 NOTE — CONSULTS
HISTORY OF PRESENT ILLNESS:  The patient is a 34-year-old -American   female whose mailing address is [id number].  She   is admitted to Intensive Care Unit of [facility] after she was transferred   from [facility].  Psychiatric evaluation is called in because of the   patient's altered mental status.  The patient states that her mother has been   taking care of her father who was hospitalized at some facility, the name of   which she does not quite recall, and she had been living at her parents' house   and realized that her sister along with her boyfriend were living there as well,   using all kinds of drugs.  She asked them to leave, which they will not.  The   patient called the police and police did not find anyone at the premises.    However, she called the police three to four time times because slowly they were   bringing their friends into the house and asked her to go buy some more drugs   for them.  The patient states that they were talking with each other and telling   that in her sleep they may want to kill her and to her, the commands were to   leave the house.  Being afraid for her life, the patient states that she did not   sleep for three nights, was not eating, and in the end the police brought her   to the hospital and [facility].  The   patient does admit to having crying spells.  Poor appetite.  Does have mood   swings, but denies any rage or temper related issues.    PAST PSYCHIATRIC HISTORY:  Positive.  The patient has history of suicide   attempts in the past and required psychiatric hospitalization.  Had been   admitted to various facilities five times so far.  She does give history of drug   abuse, which was the reason for her divorce and lost custody of her children.    FAMILY HISTORY:  Questionable.    SOCIAL HISTORY:  The patient's parents are both in their 60s.  She states that   she has one sister.   She is the oldest.  Finished high school when she was 15,   has been  once, has four children, the oldest being 15 and the youngest   being 2.  She denies alcohol or tobacco use that she quit smoking sometime back.    She did admit to using marijuana and did say that she had a history of various   use of drugs.    PAST MEDICAL AND SURGICAL HISTORY:  The patient had at the time of admission   acute kidney insult.    ALLERGIES:  None known.    CURRENT PSYCHOTROPICS:  None.    MENTAL STATUS EXAMINATION:  A 34-year-old -American female who was   sitting in bed, crying profusely because she has been missing her family.  The   patient is sad, tearful.  Affect is labile.  She is suspicious, paranoid   ideations, having both auditory and visual hallucinations, command in nature.    She is alert and cooperative.  Knows today is Saturday, 10/19/2019.  Her serial   sevens are poor, 100 minus 7 is 80.  Recent memory is 3 out of 4 after 1 minute   and 2 out of 4 after 5 minutes.  Remote memory seems fair.  Her insight and   judgment is markedly impaired.    IMPRESSION:  AXIS I:  Schizoaffective disorder along with psychosis secondary to street drug   use.  Urine drug screen is positive for amphetamine and opioids.    RECOMMENDATIONS:  Haldol 5 mg IM q. 8 hours, Catapres 0.1 mg one p.o. q. 8 hours   p.r.n. if blood pressure above 140/90.  The patient needs psychiatric inpatient   treatment.  Referred the patient to the appropriate psychiatric facility with   the assistance from .      NIES  dd: 10/19/2019 12:09:46 (CDT)  td: 10/19/2019 17:53:09 (CDT)  Doc ID   #[id number]  Job ID #[id number]    CC:

## 2019-10-19 NOTE — ASSESSMENT & PLAN NOTE
Patient with encephalopathy due to jennifer, substance abuse /mental illness . Treatment for illness causative of encephalopathy is under way. Encephalopathy noted as a secondary process related to the patient's acute illness. There is no specific treatment. Monitor neuro status, avoid medications such as narcotics and benzos which may worsen confusion, and use anti-psychotics to prevent behaviors of self harm.  Ct head neg

## 2019-10-19 NOTE — PLAN OF CARE
Patient has remained calm and cooperative throughout shift. Denies hallucinations. Sitter has remained at bedside for safety. Ambulated to restroom to void. No falls this shift safety maintained.

## 2019-10-19 NOTE — PSYCH
Note dictated      Dc Haldol  Haldol 5 mg IM q 8 hrs  Catapres 0.1 mg po q 8 hrs prn if B/P if blood pressure over 140/90    Inpatient psych facility when medically stable    Thank you

## 2019-10-19 NOTE — ASSESSMENT & PLAN NOTE
Acute --resolved after bicarb gtt .  Follow renal panel and electrolytes closely.  Ctap-no obstructon .  Adjust renal dose medications for Estimated Creatinine Clearance: 76 mL/min (based on SCr of 1.1 mg/dL).  Avoid NSAIDs, Reddy-II inhibitors, ACE-I, Angiotensin Receptor Blockers, or Aminoglycosides.  Urine analysis.-mild proteinuria /wbc/trichomonas --treat std

## 2019-10-19 NOTE — HOSPITAL COURSE
34-year-old female admitted for acute kidney injury after polysubstance abuse, dehydration from gastroenteritis, and rhabdomyolysis.  Renal function returned to normal after IV fluids with bicarb drip.  She had no further nausea vomiting or diarrhea.  Her electrolytes were monitored and replaced.  Her CPK level trended down.  She was able to tolerate p.o..    She is on Cipro for urinary tract infection.  She was treated with Flagyl for trichomoniasis.  She is on vitamin-D supplements.    She had hallucinations which were mild and resolved with intermittent treatment with Haldol p.r.n..  Physician emergency certificate was continued from  emergency room stay and Psychiatry consulted.  She reports history of drug use and prior inpatient psychiatric treatment for depression.  Psychiatry recommended inpatient treatment. The patient will be discharged today.

## 2019-10-19 NOTE — NURSING
Pt seen by Dr Del Valle.  Inpatient consult to [facility] canceled per nursing communication order.    Pt tearful prior to Dr Del Valle rounding.  Medicated per MAR.    Pt medically cleared per primary.

## 2019-10-19 NOTE — PLAN OF CARE
Pt medically cleared for discharge w/ LA CEC,PEC.      Referral sent to:  [facility] [phone]   Fax:[phone]  [facility]   [phone]   Fax:[phone]  [facility] [phone]   Fax: [phone]  [facility]  [phone]    FAX: [phone]

## 2019-10-19 NOTE — ASSESSMENT & PLAN NOTE
Pt currently PEC'd.  Auditory and visual hallucinations following possible use of ICE, heroin and cocaine.  Monitor for s/s of withdrawal.  Denies Si/HI  Psychiatry consulted   Hallucinations improved with  haldol prn   Pt reports hx depression/inpatient psychiatric treatment

## 2019-10-19 NOTE — PROGRESS NOTES
[facility] Hospital Medicine  Progress Note    Patient Name: Eleno Kiran  MRN: [id number]  Patient Class: IP- Inpatient   Admission Date: 10/17/2019  Length of Stay: 2 days  Attending Physician: Manda Guadarrama MD  Primary Care Provider: Gerhard Sequeira III, MD        Subjective:     Principal Problem:JULIEN (acute kidney injury)  Acute Condition: julien-/delirium        HPI:  Eleno Kiran is a 35 y/o F who was transferred [facility] with a diagnosis of acute kidney injury.  Pt is alert, but confused, and reporting both auditory and visual hallucinations that were thought to have started around 0800 Wednesday morning after use of ice, heroin and/or cocaine.  Pt was noted to have metabolic acidosis with an anion gap of 22.  She was placed on a bicarb drip but has failed to produce urine.  She was sent to [facility] for the service of nephrology.  Further history is difficult to obtain as pt remains confused and continues to have hallucinations.    Overview/Hospital Course:  34-year-old female admitted for acute kidney injury after polysubstance abuse, dehydration from gastroenteritis, and rhabdomyolysis.  Renal function returned to normal after IV fluids with bicarb drip and normal saline.  She had no further nausea vomiting or diarrhea.  Her electrolytes were monitored and replaced.  Her CPK level trended down.  She was able to tolerate p.o..    She had hallucinations which were mild and resolved with intermittent treatment with Haldol p.r.n..  Physician emergency certificate was continued from her add emergency room stay and Psychiatry consulted.  She reports history of drug use and prior inpatient psychiatric treatment for depression.        Interval History:   Pt seen/examined-nurse present   No hallucinations today -had haldol once overnight  Depressed affect but pleasant   C/o vag itch/burn     Review of Systems   Constitutional: Negative for chills and fever.    Respiratory: Negative for cough and shortness of breath.    Gastrointestinal: Negative for abdominal pain, diarrhea, nausea and vomiting.   Genitourinary: Positive for dysuria and vaginal discharge.   Psychiatric/Behavioral: Positive for dysphoric mood. Negative for suicidal ideas.        Objective:     Vital Signs (Most Recent):  Temp: 98.3 °F (36.8 °C) (10/19/19 0805)  Pulse: 82 (10/19/19 0830)  Resp: 20 (10/19/19 0830)  BP: 121/74 (10/19/19 0805)  SpO2: 100 % (10/19/19 0830) Vital Signs (24h Range):  Temp:  [97.4 °F (36.3 °C)-98.3 °F (36.8 °C)] 98.3 °F (36.8 °C)  Pulse:  [] 82  Resp:  [18-76] 20  SpO2:  [72 %-100 %] 100 %  BP: (104-126)/(56-92) 121/74     Weight: 81.2 kg (179 lb 0.2 oz)  Body mass index is 28.89 kg/m².    Intake/Output Summary (Last 24 hours) at 10/19/2019 1111  Last data filed at 10/19/2019 0900  Gross per 24 hour   Intake 3712.5 ml   Output 400 ml   Net 3312.5 ml      Physical Exam   Constitutional: She is oriented to person, place, and time. She appears well-developed and well-nourished. No distress.   HENT:   Head: Normocephalic and atraumatic.   Nose: Nose normal.   Mouth/Throat: Oropharynx is clear and moist.   Eyes: Conjunctivae are normal. No scleral icterus.   Neck: Neck supple. No JVD present.   Cardiovascular: Normal rate and regular rhythm.   Pulmonary/Chest: Effort normal and breath sounds normal. No respiratory distress.   Abdominal: Soft. Bowel sounds are normal. She exhibits no distension. There is no tenderness.   Genitourinary:   Genitourinary Comments: deferred     Musculoskeletal: Normal range of motion. She exhibits no edema.   Lymphadenopathy:     She has no cervical adenopathy.   Neurological: She is alert and oriented to person, place, and time. GCS eye subscore is 4. GCS verbal subscore is 4. GCS motor subscore is 6.   Skin: Skin is warm and dry. Capillary refill takes less than 2 seconds. No rash noted.   Psychiatric: Her mood appears not anxious. Her speech is  not rapid and/or pressured. She is not agitated and not hyperactive. Thought content is paranoid. Cognition and memory are not impaired. She expresses impulsivity. She exhibits a depressed mood. She expresses no homicidal and no suicidal ideation. She expresses no suicidal plans and no homicidal plans.   Nursing note and vitals reviewed.      Significant Labs:   Urine Studies:   Recent Labs   Lab 10/18/19  1202   COLORU Yellow   APPEARANCEUA Hazy*   PHUR 7.0   SPECGRAV 1.025   PROTEINUA 1+*   GLUCUA Negative   KETONESU 1+*   BILIRUBINUA Negative   OCCULTUA 3+*   NITRITE Negative   UROBILINOGEN Negative   LEUKOCYTESUR 2+*   RBCUA 3   WBCUA 32*   BACTERIA Many*   HYALINECASTS 0      2d ago   Urine Culture, Routine Multiple organisms isolated. None in predominance.  Repeat if    Urine Culture, Routine clinically necessary.    Results for ELLIOTT CARTER (MRN [id number]) as of 10/19/2019 11:13   Ref. Range 10/18/2019 04:10 10/19/2019 03:54   Sodium Latest Ref Range: 136 - 145 mmol/L 140 142   Potassium Latest Ref Range: 3.5 - 5.1 mmol/L 2.8 (L) 3.2 (L)   Chloride Latest Ref Range: 95 - 110 mmol/L 104 109   CO2 Latest Ref Range: 23 - 29 mmol/L 21 (L) 24   Anion Gap Latest Ref Range: 8 - 16 mmol/L 15 9   BUN, Bld Latest Ref Range: 6 - 20 mg/dL 16 7   Creatinine Latest Ref Range: 0.5 - 1.4 mg/dL 1.1 0.7   eGFR if non African American Latest Ref Range: >60 mL/min/1.73 m^2 >60 >60   eGFR if  Latest Ref Range: >60 mL/min/1.73 m^2 >60 >60   Glucose Latest Ref Range: 70 - 110 mg/dL 101 102   Calcium Latest Ref Range: 8.7 - 10.5 mg/dL 9.3 8.3 (L)   Phosphorus Latest Ref Range: 2.7 - 4.5 mg/dL 1.9 (L) 1.2 (L)   Magnesium Latest Ref Range: 1.6 - 2.6 mg/dL 2.0 1.9   CPK Latest Ref Range: 20 - 180 U/L 799 (H) 298 (H)       Significant Imaging: I have reviewed and interpreted all pertinent imaging results/findings within the past 24 hours.      Assessment/Plan:      * JULIEN (acute kidney injury)  Acute --resolved  after bicarb gtt .fand ivf -likely 2/2 GE, dehydration, drugs and rhabdo  Follow renal panel and electrolytes closely.  Ctap-no obstructon .  Adjust renal dose medications for Estimated Creatinine Clearance: 119.4 mL/min (based on SCr of 0.7 mg/dL).  Avoid NSAIDs, Reddy-II inhibitors, ACE-I, Angiotensin Receptor Blockers, or Aminoglycosides.  Urine analysis.-mild proteinuria /wbc/trichomonas --treat std   Urine culture neg                   Encephalopathy, metabolic  Patient with encephalopathy due to jennifer, substance abuse /mental illness . Treatment for illness causative of encephalopathy is under way. Encephalopathy noted as a secondary process related to the patient's acute illness. There is no specific treatment. Monitor neuro status, avoid medications such as narcotics and benzos which may worsen confusion, and use anti-psychotics to prevent behaviors of self harm.  Ct head neg        Trichomonas vaginitis  Acute -noted on ua  Treat with metronidazole       Hypophosphatemia  Acute liekly 2/2 acute gi illness/decreased po intake  Replace and monitor       Gastroenteritis  Acute-improving clinical bases  -clear liquid deit       Non-traumatic rhabdomyolysis  Acute , resolving -ok to dc ivf and continue to trend   Monitored renal function which has now normalized       Polysubstance abuse  Hx cannabis/spice abuse with admit hx of Ice, cocaine and heroin.  Discussed th deleterious effects of substance abuse.        Hallucination, drug-induced  Pt currently PEC'd.  Auditory and visual hallucinations following possible use of ICE, heroin and cocaine.  Monitor for s/s of withdrawal.  Denies Si/HI  Psychiatry consulted --await eval   Hallucinations improved with  haldol prn   Pt reports hx depression/inpatient psychiatric treatment-          Metabolic acidosis  anion-gap metabolic acidosis with bicarb 16 and gap 22, placed on bicarb drip and having minimal urine output.  Nephrology consulted.  Monitor I&O's  BMP, Mg,  phos, cbc daily.        VTE Risk Mitigation (From admission, onward)         Ordered     IP VTE HIGH RISK PATIENT  Once      10/17/19 2054     Place ANNALISE hose  Until discontinued      10/17/19 2054     Place sequential compression device  Until discontinued      10/17/19 2054              Patient medically stable and cleared for inpatient psychiatric care pending psychiatric evaluation.    Manda Guadarrama MD  Department of Hospital Medicine   [facility]

## 2019-10-19 NOTE — ASSESSMENT & PLAN NOTE
Acute , resolving -ok to dc ivf and continue to trend   Monitored renal function which has now normalized

## 2019-10-19 NOTE — NURSING
Pt awake and calm in bed.  Sitter at bedside.  Pt gets a little anxious/fidgety  when asked about care.  Some what tearful.  States just miss my kids.  Alert oriented.  Follows commands.  Tolerating diet.  Ambulates to toilet independently.  Easily calmed.  No auditory or visual hallucinations.  POC discussed and pt verbalized understanding.  Does c/o some burning/itching when urinating.  Discussed order for flagyl.

## 2019-10-19 NOTE — PLAN OF CARE
Pt remains in ICU.  Seen by psych.  CEC.  Ambulates to bathroom.  A/O.  No longer having hallucinations.  Remained calm through out the day.  Tearful this morning and around lunch.  Haldol changed to scheduled and clonidine prn to control blood pressure which patient has not needed.  Having loose stool.  Tolerating diet.  Medically cleared.  Awaiting bed availability at inpatient psych facility.  POC discussed.  Verbalized understanding.

## 2019-10-19 NOTE — PROGRESS NOTES
INPATIENT NEPHROLOGY PROGRESS NOTE  [facility] NEPHROLOGY    Patient Name: Eleno Kiran  Date: 10/19/2019    Reason for consultation: JULIEN    Chief Complaint: Drug overdose     History of Present Illness:  34[place] [facility] [place] JULIEN with oliguria, hypokalemia, acidosis requiring bicarb gtt, in setting of overdose with heroin, cocaine, amphetamines. Renal consulted for co-management.    · Interval History/Subjective:    - VSS, on RA, UOP 400cc  - no cp, dyspnea, abd pain, edema    · Review of Systems: All 14 systems reviewed and negative, except as noted in HPI    Plan of Care:    Assessment:  JULIEN 2/2 nontraumatic rhabdomyolysis from drug overdose  Hypokalemia  Hypophosphatemia  Anemia    Plan:    - JULIEN has resolved. D/C IVFs.  - K is better. She is on BID KCl. Mg is replete. Continue normal K diet.  - Ordered 25 vitamin D.  - Likely dilutional component to anemia- if persists tomorrow, will add iron supplementation.    Thank you for allowing us to participate in this patient's care. We will continue to follow.    Medications:  No current facility-administered medications on file prior to encounter.      No current outpatient medications on file prior to encounter.     Scheduled Meds:   haloperidol lactate  5 mg Intramuscular Q8H    hydrocortisone   Topical (Top) TID    potassium chloride  40 mEq Oral BID    potassium, sodium phosphates  1 packet Oral BID    senna-docusate 8.6-50 mg  1 tablet Oral BID     Continuous Infusions:  PRN Meds:.acetaminophen, albuterol-ipratropium, cloNIDine, dextrose 50%, dextrose 50%, glucagon (human recombinant), glucose, glucose, ondansetron, promethazine (PHENERGAN) IVPB    Allergies:  Patient has no known allergies.    Vital Signs:  Temp Readings from Last 3 Encounters:   10/19/19 98.5 °F (36.9 °C) (Oral)   10/17/19 98.2 °F (36.8 °C) (Oral)   05/10/18 98.4 °F (36.9 °C) (Oral)       Pulse Readings from Last 3 Encounters:   10/19/19 74   10/17/19 (!) 111    05/10/18 (!) 56       BP Readings from Last 3 Encounters:   10/19/19 124/71   10/17/19 (!) 144/96   05/10/18 128/83       Weight:  Wt Readings from Last 3 Encounters:   10/17/19 81.2 kg (179 lb 0.2 oz)   10/17/19 87.1 kg (192 lb)   10/17/19 87.1 kg (192 lb 0.3 oz)       INS/OUTS:  I/O last 3 completed shifts:  In: 4969.2 [P.O.:920; I.V.:4049.2]  Out: 600 [Urine:600]  I/O this shift:  In: 800 [P.O.:800]  Out: -     Physical Exam:  Constitutional: nad, aao x 3  Heart: rrr, no m/r/g, wwp, no edema  Lungs: ctab, no w/r/r/c, no lb  Abdomen: s/nt/nd, +BS    Results:  Lab Results   Component Value Date     10/19/2019    K 3.2 (L) 10/19/2019     10/19/2019    CO2 24 10/19/2019    BUN 7 10/19/2019    CREATININE 0.7 10/19/2019    CALCIUM 8.3 (L) 10/19/2019    ANIONGAP 9 10/19/2019    ESTGFRAFRICA >60 10/19/2019    EGFRNONAA >60 10/19/2019       Lab Results   Component Value Date    CALCIUM 8.3 (L) 10/19/2019    PHOS 1.2 (L) 10/19/2019       Recent Labs   Lab 10/19/19  0354   WBC 7.39   RBC 3.01*   HGB 9.8*   HCT 28.8*      MCV 96   MCH 32.6*   MCHC 34.0       I have personally reviewed pertinent radiological imaging and reports.    I have spent > 35 minutes providing care for this patient for the above diagnoses. These services have included chart/data/imaging review, evaluation, exam, formulation of plan, , note preparation, and discussions with staff involved in this patient's care.    Dorothy Falk MD MPH  [facility] [address] [address]  [phone] (p)  [phone] (f)

## 2019-10-19 NOTE — PROGRESS NOTES
[facility] Hospital Medicine  Progress Note    Patient Name: Eleno Kiran  MRN: [id number]  Patient Class: IP- Inpatient   Admission Date: 10/17/2019  Length of Stay: 2 days  Attending Physician: Manda Guadarrama MD  Primary Care Provider: Gerhard Sequeira III, MD        Subjective:     Principal Problem:JULIEN (acute kidney injury)  Acute Condition: julien        HPI:  Eleno Kiran is a 35 y/o F who [place] [facility] [place] [facility] with a diagnosis of acute kidney injury.  Pt is alert, but confused, and reporting both auditory and visual hallucinations that were thought to have started around 0800 Wednesday morning after use of ice, heroin and/or cocaine.  Pt was noted to have metabolic acidosis with an anion gap of 22.  She was placed on a bicarb drip but has failed to produce urine.  She was sent to [facility] for the service of nephrology.  Further history is difficult to obtain as pt remains confused and continues to have hallucinations.    Overview/Hospital Course:  No notes on file    Interval History:   Patient seen examined.  She is awake.  She had some agitation which improved with Haldol.  She reports depression and prior hospitalizations x5 in psychiatric facilities.  She denies current SI/HI      Review of Systems   Constitutional: Negative for chills and fever.   Respiratory: Negative for cough and chest tightness.    Cardiovascular: Negative for chest pain and leg swelling.   Gastrointestinal: Negative for abdominal pain.   Genitourinary: Negative for difficulty urinating and dysuria.   Musculoskeletal: Negative for arthralgias and back pain.   Psychiatric/Behavioral: Positive for behavioral problems and hallucinations. Negative for suicidal ideas. The patient is nervous/anxious.      Objective:     Vital Signs (Most Recent):  Temp: 98 °F (36.7 °C) (10/18/19 1200)  Pulse: 84 (10/18/19 1542)  Resp: (!) 32 (10/18/19 1542)  BP: 115/71 (10/18/19 1400)  SpO2: 95 %  (10/18/19 1542) Vital Signs (24h Range):  Temp:  [98 °F (36.7 °C)-98.6 °F (37 °C)] 98 °F (36.7 °C)  Pulse:  [] 84  Resp:  [14-84] 32  SpO2:  [90 %-100 %] 95 %  BP: ()/(59-97) 115/71     Weight: 81.2 kg (179 lb 0.2 oz)  Body mass index is 28.89 kg/m².    Intake/Output Summary (Last 24 hours) at 10/18/2019 6914  Last data filed at 10/18/2019 1200  Gross per 24 hour   Intake 1506.67 ml   Output 600 ml   Net 906.67 ml      Physical Exam   Constitutional: She is oriented to person, place, and time. She appears well-developed and well-nourished. No distress.   HENT:   Head: Normocephalic and atraumatic.   Nose: Nose normal.   Mouth/Throat: Oropharynx is clear and moist.   Eyes: Conjunctivae are normal. No scleral icterus.   Neck: Neck supple. No JVD present.   Cardiovascular: Normal rate and regular rhythm.   Pulmonary/Chest: Effort normal and breath sounds normal. No respiratory distress.   Abdominal: Soft. Bowel sounds are normal. She exhibits no distension. There is no tenderness.   Genitourinary:   Genitourinary Comments: deferred     Musculoskeletal: Normal range of motion. She exhibits no edema.   Lymphadenopathy:     She has no cervical adenopathy.   Neurological: She is alert and oriented to person, place, and time. GCS eye subscore is 4. GCS verbal subscore is 4. GCS motor subscore is 6.   Skin: Skin is warm and dry. Capillary refill takes less than 2 seconds. No rash noted.   Psychiatric: Her mood appears anxious. Her affect is not labile. Her speech is rapid and/or pressured. She is not agitated and not hyperactive. Thought content is paranoid. Cognition and memory are impaired. She expresses impulsivity. She exhibits a depressed mood. She expresses no homicidal and no suicidal ideation. She expresses no suicidal plans and no homicidal plans.   Nursing note and vitals reviewed.      Significant Labs:   BMP:   Recent Labs   Lab 10/18/19  0410         K 2.8*      CO2 21*   BUN 16    CREATININE 1.1   CALCIUM 9.3   MG 2.0     CBC:   Recent Labs   Lab 10/17/19  0902 10/18/19  0410   WBC 14.72* 9.22   HGB 15.4 11.6*   HCT 43.2 33.7*    216       Significant Imaging: I have reviewed and interpreted all pertinent imaging results/findings within the past 24 hours.   1. Previous cholecystectomy with mild extrahepatic biliary ductal dilatation.  2. No renal calculi.  3. Fluid filled but mild dilated loops of small bowel.  This can be seen with gastroenteritis.  4. Mild lumbar levoscoliosis.        Assessment/Plan:      * JULIEN (acute kidney injury)  Acute --resolved after bicarb gtt .  Follow renal panel and electrolytes closely.  Ctap-no obstructon .  Adjust renal dose medications for Estimated Creatinine Clearance: 76 mL/min (based on SCr of 1.1 mg/dL).  Avoid NSAIDs, Reddy-II inhibitors, ACE-I, Angiotensin Receptor Blockers, or Aminoglycosides.  Urine analysis.-mild proteinuria /wbc/trichomonas --treat std                 Encephalopathy, metabolic  Patient with encephalopathy due to julien, substance abuse /mental illness . Treatment for illness causative of encephalopathy is under way. Encephalopathy noted as a secondary process related to the patient's acute illness. There is no specific treatment. Monitor neuro status, avoid medications such as narcotics and benzos which may worsen confusion, and use anti-psychotics to prevent behaviors of self harm.  Ct head neg        Trichomonas vaginitis  Acute -noted on ua  Treat with metronidazole       Hypophosphatemia  Acute liekly 2/2 acute gi illness/decreased po intake  Replace and monitor       Gastroenteritis  Acute-improving clinical bases  -clear liquid deit       Non-traumatic rhabdomyolysis  IVF  Check CPK in am      Polysubstance abuse  Hx cannabis/spice abuse with admit hx of Ice, cocaine and heroin.  Discussed th deleterious effects of substance abuse.        Hallucination, drug-induced  Pt currently PEC'd.  Auditory and visual hallucinations  following possible use of ICE, heroin and cocaine.  Monitor for s/s of withdrawal.  Denies Si/HI  Psychiatry consulted   Hallucinations improved with  haldol prn   Pt reports hx depression/inpatient psychiatric treatment        Metabolic acidosis  anion-gap metabolic acidosis with bicarb 16 and gap 22, placed on bicarb drip and having minimal urine output.  Nephrology consulted.  Monitor I&O's  BMP, Mg, phos, cbc daily.        VTE Risk Mitigation (From admission, onward)         Ordered     IP VTE HIGH RISK PATIENT  Once      10/17/19 2054     Place ANNALISE hose  Until discontinued      10/17/19 2054     Place sequential compression device  Until discontinued      10/17/19 2054                Critical care time spent on the evaluation and treatment of severe organ dysfunction, review of pertinent labs and imaging studies, discussions with consulting providers and discussions with patient/family: 35 minutes.      Manda Guadarrama MD  Department of Hospital Medicine   [facility]

## 2019-10-20 LAB
25(OH)D3+25(OH)D2 SERPL-MCNC: 10 NG/ML (ref 30–96)
ANION GAP SERPL CALC-SCNC: 10 MMOL/L (ref 8–16)
BACTERIA UR CULT: ABNORMAL
BASOPHILS # BLD AUTO: 0.03 K/UL (ref 0–0.2)
BASOPHILS NFR BLD: 0.5 % (ref 0–1.9)
BUN SERPL-MCNC: 7 MG/DL (ref 6–20)
CALCIUM SERPL-MCNC: 8.8 MG/DL (ref 8.7–10.5)
CHLORIDE SERPL-SCNC: 108 MMOL/L (ref 95–110)
CK SERPL-CCNC: 159 U/L (ref 20–180)
CO2 SERPL-SCNC: 22 MMOL/L (ref 23–29)
CREAT SERPL-MCNC: 0.7 MG/DL (ref 0.5–1.4)
DIFFERENTIAL METHOD: ABNORMAL
EOSINOPHIL # BLD AUTO: 0.1 K/UL (ref 0–0.5)
EOSINOPHIL NFR BLD: 1.3 % (ref 0–8)
ERYTHROCYTE [DISTWIDTH] IN BLOOD BY AUTOMATED COUNT: 12.9 % (ref 11.5–14.5)
EST. GFR  (AFRICAN AMERICAN): >60 ML/MIN/1.73 M^2
EST. GFR  (NON AFRICAN AMERICAN): >60 ML/MIN/1.73 M^2
FERRITIN SERPL-MCNC: 69 NG/ML (ref 20–300)
GLUCOSE SERPL-MCNC: 87 MG/DL (ref 70–110)
HCT VFR BLD AUTO: 29.7 % (ref 37–48.5)
HGB BLD-MCNC: 10 G/DL (ref 12–16)
IMM GRANULOCYTES # BLD AUTO: 0.01 K/UL (ref 0–0.04)
LYMPHOCYTES # BLD AUTO: 2.6 K/UL (ref 1–4.8)
LYMPHOCYTES NFR BLD: 41.7 % (ref 18–48)
MAGNESIUM SERPL-MCNC: 1.7 MG/DL (ref 1.6–2.6)
MCH RBC QN AUTO: 32.6 PG (ref 27–31)
MCHC RBC AUTO-ENTMCNC: 33.7 G/DL (ref 32–36)
MCV RBC AUTO: 97 FL (ref 82–98)
MONOCYTES # BLD AUTO: 0.5 K/UL (ref 0.3–1)
MONOCYTES NFR BLD: 7.7 % (ref 4–15)
NEUTROPHILS # BLD AUTO: 3 K/UL (ref 1.8–7.7)
NEUTROPHILS NFR BLD: 48.6 % (ref 38–73)
NRBC BLD-RTO: 0 /100 WBC
PHOSPHATE SERPL-MCNC: 1.8 MG/DL (ref 2.7–4.5)
PLATELET # BLD AUTO: 204 K/UL (ref 150–350)
PMV BLD AUTO: 10.9 FL (ref 9.2–12.9)
POTASSIUM SERPL-SCNC: 3.5 MMOL/L (ref 3.5–5.1)
RBC # BLD AUTO: 3.07 M/UL (ref 4–5.4)
SODIUM SERPL-SCNC: 140 MMOL/L (ref 136–145)
WBC # BLD AUTO: 6.11 K/UL (ref 3.9–12.7)

## 2019-10-20 PROCEDURE — 99900035 HC TECH TIME PER 15 MIN (STAT)

## 2019-10-20 PROCEDURE — 85025 COMPLETE CBC W/AUTO DIFF WBC: CPT

## 2019-10-20 PROCEDURE — 25000003 PHARM REV CODE 250: Performed by: INTERNAL MEDICINE

## 2019-10-20 PROCEDURE — 20000000 HC ICU ROOM

## 2019-10-20 PROCEDURE — 25000003 PHARM REV CODE 250: Performed by: HOSPITALIST

## 2019-10-20 PROCEDURE — 82550 ASSAY OF CK (CPK): CPT

## 2019-10-20 PROCEDURE — 63600175 PHARM REV CODE 636 W HCPCS: Performed by: PSYCHIATRY & NEUROLOGY

## 2019-10-20 PROCEDURE — 84100 ASSAY OF PHOSPHORUS: CPT

## 2019-10-20 PROCEDURE — 83735 ASSAY OF MAGNESIUM: CPT

## 2019-10-20 PROCEDURE — 82306 VITAMIN D 25 HYDROXY: CPT

## 2019-10-20 PROCEDURE — 83540 ASSAY OF IRON: CPT

## 2019-10-20 PROCEDURE — 80048 BASIC METABOLIC PNL TOTAL CA: CPT

## 2019-10-20 PROCEDURE — 94761 N-INVAS EAR/PLS OXIMETRY MLT: CPT

## 2019-10-20 PROCEDURE — 36415 COLL VENOUS BLD VENIPUNCTURE: CPT

## 2019-10-20 PROCEDURE — 82728 ASSAY OF FERRITIN: CPT

## 2019-10-20 RX ORDER — FERROUS SULFATE 325(65) MG
325 TABLET, DELAYED RELEASE (ENTERIC COATED) ORAL DAILY
Status: DISCONTINUED | OUTPATIENT
Start: 2019-10-20 | End: 2019-10-22 | Stop reason: HOSPADM

## 2019-10-20 RX ORDER — LANOLIN ALCOHOL/MO/W.PET/CERES
400 CREAM (GRAM) TOPICAL DAILY
Status: DISCONTINUED | OUTPATIENT
Start: 2019-10-20 | End: 2019-10-22 | Stop reason: HOSPADM

## 2019-10-20 RX ORDER — ACETAMINOPHEN 500 MG
5000 TABLET ORAL DAILY
Status: DISCONTINUED | OUTPATIENT
Start: 2019-10-20 | End: 2019-10-22 | Stop reason: HOSPADM

## 2019-10-20 RX ADMIN — POTASSIUM & SODIUM PHOSPHATES POWDER PACK 280-160-250 MG 1 PACKET: 280-160-250 PACK at 09:10

## 2019-10-20 RX ADMIN — HYDROCORTISONE: 25 CREAM TOPICAL at 09:10

## 2019-10-20 RX ADMIN — POTASSIUM CHLORIDE 40 MEQ: 20 TABLET, EXTENDED RELEASE ORAL at 09:10

## 2019-10-20 RX ADMIN — FERROUS SULFATE TAB EC 325 MG (65 MG FE EQUIVALENT) 325 MG: 325 (65 FE) TABLET DELAYED RESPONSE at 04:10

## 2019-10-20 RX ADMIN — MAGNESIUM OXIDE 400 MG (241.3 MG MAGNESIUM) TABLET 400 MG: TABLET at 04:10

## 2019-10-20 RX ADMIN — Medication 5000 UNITS: at 04:10

## 2019-10-20 RX ADMIN — HYDROCORTISONE: 25 CREAM TOPICAL at 02:10

## 2019-10-20 RX ADMIN — HALOPERIDOL LACTATE 5 MG: 5 INJECTION, SOLUTION INTRAMUSCULAR at 06:10

## 2019-10-20 RX ADMIN — HALOPERIDOL LACTATE 5 MG: 5 INJECTION, SOLUTION INTRAMUSCULAR at 02:10

## 2019-10-20 RX ADMIN — HALOPERIDOL LACTATE 5 MG: 5 INJECTION, SOLUTION INTRAMUSCULAR at 09:10

## 2019-10-20 RX ADMIN — HYDROCORTISONE: 25 CREAM TOPICAL at 04:10

## 2019-10-20 NOTE — PROGRESS NOTES
INPATIENT NEPHROLOGY PROGRESS NOTE  [facility] NEPHROLOGY    Patient Name: Eleno Kiran  Date: 10/20/2019    Reason for consultation: JULIEN    Chief Complaint: Drug overdose     History of Present Illness:  34[place] [facility] [place] JULIEN with oliguria, hypokalemia, acidosis requiring bicarb gtt, in setting of overdose with heroin, cocaine, amphetamines. Renal consulted for co-management.    · Interval History/Subjective:    - VSS, on RA, voiding  - no cp, dyspnea, abd pain, edema    · Review of Systems: All 14 systems reviewed and negative, except as noted in HPI    Plan of Care:    Assessment:  JULIEN 2/2 nontraumatic rhabdomyolysis from drug overdose  Hypokalemia/HypoMg  Hypophosphatemia  Anemia    Plan:    - JULIEN remains resolved.   - K is better. Continue oral KCl 40mEq BID. Continue normal K diet. Ordered MgOH 400mg daily.  - Her low phos is 2/2 severe vitamin D deficiency. D/C K phos packets. Ordered daily supplementation.  - Hb is stable. Add iron studies. Ordered daily supplementation.    Will sign off. Please call with questions.    Medications:  No current facility-administered medications on file prior to encounter.      No current outpatient medications on file prior to encounter.     Scheduled Meds:   haloperidol lactate  5 mg Intramuscular Q8H    hydrocortisone   Topical (Top) TID    potassium chloride  40 mEq Oral BID    potassium, sodium phosphates  1 packet Oral BID    senna-docusate 8.6-50 mg  1 tablet Oral BID     Continuous Infusions:  PRN Meds:.acetaminophen, albuterol-ipratropium, cloNIDine, dextrose 50%, dextrose 50%, glucagon (human recombinant), glucose, glucose, ondansetron    Allergies:  Patient has no known allergies.    Vital Signs:  Temp Readings from Last 3 Encounters:   10/20/19 98.4 °F (36.9 °C) (Oral)   10/17/19 98.2 °F (36.8 °C) (Oral)   05/10/18 98.4 °F (36.9 °C) (Oral)       Pulse Readings from Last 3 Encounters:   10/20/19 94   10/17/19 (!) 111   05/10/18  (!) 56       BP Readings from Last 3 Encounters:   10/20/19 136/84   10/17/19 (!) 144/96   05/10/18 128/83       Weight:  Wt Readings from Last 3 Encounters:   10/17/19 81.2 kg (179 lb 0.2 oz)   10/17/19 87.1 kg (192 lb)   10/17/19 87.1 kg (192 lb 0.3 oz)       INS/OUTS:  I/O last 3 completed shifts:  In: 3175 [P.O.:1390; I.V.:1785]  Out: -   No intake/output data recorded.    Physical Exam:  Constitutional: nad, aao x 3  Heart: rrr, no m/r/g, wwp, no edema  Lungs: ctab, no w/r/r/c, no lb  Abdomen: s/nt/nd, +BS    Results:  Lab Results   Component Value Date     10/20/2019    K 3.5 10/20/2019     10/20/2019    CO2 22 (L) 10/20/2019    BUN 7 10/20/2019    CREATININE 0.7 10/20/2019    CALCIUM 8.8 10/20/2019    ANIONGAP 10 10/20/2019    ESTGFRAFRICA >60 10/20/2019    EGFRNONAA >60 10/20/2019       Lab Results   Component Value Date    CALCIUM 8.8 10/20/2019    PHOS 1.8 (L) 10/20/2019       Recent Labs   Lab 10/20/19  0419   WBC 6.11   RBC 3.07*   HGB 10.0*   HCT 29.7*      MCV 97   MCH 32.6*   MCHC 33.7       I have personally reviewed pertinent radiological imaging and reports.    I have spent > 35 minutes providing care for this patient for the above diagnoses. These services have included chart/data/imaging review, evaluation, exam, formulation of plan, , note preparation, and discussions with staff involved in this patient's care.    Dorothy Falk MD MPH  [facility] [address] [address]  [phone] (p)  [phone] (f)

## 2019-10-20 NOTE — PLAN OF CARE
10/19/19 1951   Patient Assessment/Suction   Level of Consciousness (AVPU) responds to voice   Respiratory Effort Unlabored   Expansion/Accessory Muscles/Retractions no use of accessory muscles;no retractions   All Lung Fields Breath Sounds clear   Rhythm/Pattern, Respiratory unlabored   PRE-TX-O2   O2 Device (Oxygen Therapy) room air   SpO2 100 %   Pulse Oximetry Type Continuous   $ Pulse Oximetry - Multiple Charge Pulse Oximetry - Multiple   Pulse 76   Resp 17   Aerosol Therapy   $ Aerosol Therapy Charges PRN treatment not required

## 2019-10-20 NOTE — PLAN OF CARE
Sitter with direct visualization of patient throughout shift.  Pt denies hallucinations at this time.  Resting well between care. Verbalizes understanding of POC.  Safety maintained.

## 2019-10-20 NOTE — PLAN OF CARE
Plan of care reviewed with patient, VS once a shift. Medically cleared. Patient resting quietly in bed.VSS. Will continue to monitor patient closely

## 2019-10-20 NOTE — PLAN OF CARE
MIKE followed up and sent additional referrals to:    [facility]  [facility]

## 2019-10-21 VITALS
WEIGHT: 179 LBS | OXYGEN SATURATION: 95 % | TEMPERATURE: 98 F | BODY MASS INDEX: 28.77 KG/M2 | DIASTOLIC BLOOD PRESSURE: 86 MMHG | HEART RATE: 86 BPM | SYSTOLIC BLOOD PRESSURE: 139 MMHG | RESPIRATION RATE: 19 BRPM | HEIGHT: 66 IN

## 2019-10-21 PROBLEM — N39.0 UTI (URINARY TRACT INFECTION): Status: ACTIVE | Noted: 2019-10-21

## 2019-10-21 PROBLEM — E87.6 HYPOKALEMIA: Status: RESOLVED | Noted: 2019-10-18 | Resolved: 2019-10-21

## 2019-10-21 PROBLEM — E83.39 HYPOPHOSPHATEMIA: Status: RESOLVED | Noted: 2019-10-18 | Resolved: 2019-10-21

## 2019-10-21 PROBLEM — G93.41 ENCEPHALOPATHY, METABOLIC: Status: RESOLVED | Noted: 2019-10-19 | Resolved: 2019-10-21

## 2019-10-21 PROBLEM — E87.20 METABOLIC ACIDOSIS: Status: RESOLVED | Noted: 2019-10-18 | Resolved: 2019-10-21

## 2019-10-21 LAB
IRON SERPL-MCNC: 131 UG/DL (ref 30–160)
SATURATED IRON: 44 % (ref 20–50)
TOTAL IRON BINDING CAPACITY: 296 UG/DL (ref 250–450)
TRANSFERRIN SERPL-MCNC: 200 MG/DL (ref 200–375)

## 2019-10-21 PROCEDURE — 63600175 PHARM REV CODE 636 W HCPCS: Performed by: PSYCHIATRY & NEUROLOGY

## 2019-10-21 PROCEDURE — 25000003 PHARM REV CODE 250: Performed by: INTERNAL MEDICINE

## 2019-10-21 PROCEDURE — 94761 N-INVAS EAR/PLS OXIMETRY MLT: CPT

## 2019-10-21 PROCEDURE — 99900035 HC TECH TIME PER 15 MIN (STAT)

## 2019-10-21 RX ORDER — CIPROFLOXACIN 500 MG/1
500 TABLET ORAL EVERY 12 HOURS
Status: DISCONTINUED | OUTPATIENT
Start: 2019-10-21 | End: 2019-10-22 | Stop reason: HOSPADM

## 2019-10-21 RX ORDER — VIT C/E/ZN/COPPR/LUTEIN/ZEAXAN 250MG-90MG
5000 CAPSULE ORAL DAILY
Refills: 0
Start: 2019-10-21 | End: 2019-11-20

## 2019-10-21 RX ORDER — CIPROFLOXACIN 500 MG/1
500 TABLET ORAL EVERY 12 HOURS
Qty: 10 TABLET | Refills: 0
Start: 2019-10-21 | End: 2019-10-26

## 2019-10-21 RX ADMIN — HALOPERIDOL LACTATE 5 MG: 5 INJECTION, SOLUTION INTRAMUSCULAR at 06:10

## 2019-10-21 RX ADMIN — CIPROFLOXACIN HYDROCHLORIDE 500 MG: 500 TABLET, FILM COATED ORAL at 09:10

## 2019-10-21 RX ADMIN — MAGNESIUM OXIDE 400 MG (241.3 MG MAGNESIUM) TABLET 400 MG: TABLET at 09:10

## 2019-10-21 RX ADMIN — HALOPERIDOL LACTATE 5 MG: 5 INJECTION, SOLUTION INTRAMUSCULAR at 09:10

## 2019-10-21 RX ADMIN — FERROUS SULFATE TAB EC 325 MG (65 MG FE EQUIVALENT) 325 MG: 325 (65 FE) TABLET DELAYED RESPONSE at 09:10

## 2019-10-21 RX ADMIN — Medication 5000 UNITS: at 09:10

## 2019-10-21 RX ADMIN — HALOPERIDOL LACTATE 5 MG: 5 INJECTION, SOLUTION INTRAMUSCULAR at 03:10

## 2019-10-21 RX ADMIN — POTASSIUM CHLORIDE 40 MEQ: 20 TABLET, EXTENDED RELEASE ORAL at 09:10

## 2019-10-21 RX ADMIN — HYDROCORTISONE: 25 CREAM TOPICAL at 09:10

## 2019-10-21 NOTE — PLAN OF CARE
Pt medically cleared, denies suicidal ideation. Denies visual or auditory hallucinations. Awaiting psych placement.

## 2019-10-21 NOTE — PLAN OF CARE
I sent the pts 3 day packet, current meds,Psych note and medical clearance to the following inpatient psych facilities via [facility]. JULIA Spencer CM will continue to follow. Syl Jean LCSW    [facility]   [facility] psych unit   [facility].      10/21/19 0876   Post-Acute Status   Post-Acute Authorization Placement   Post-Acute Placement Status Referrals Sent

## 2019-10-21 NOTE — PLAN OF CARE
Report called to Ronna at [facility], also notified pt's mother of transferred, [facility] called for transport and awaiting , A&O x's 4, denies any hallucinations or delusions, no c/o pain/discomfort, no s/s of acute distress at this time, safety maintained  [id number] called [facility] for ETA for -another 2 hours at this time no drivers available [facility] to call with ETA when  becomes available

## 2019-10-21 NOTE — PLAN OF CARE
10/20/19 1946   Patient Assessment/Suction   Level of Consciousness (AVPU) alert   Respiratory Effort Unlabored   Expansion/Accessory Muscles/Retractions no use of accessory muscles;no retractions   All Lung Fields Breath Sounds clear   Rhythm/Pattern, Respiratory unlabored   PRE-TX-O2   O2 Device (Oxygen Therapy) room air   SpO2 100 %   Pulse Oximetry Type Intermittent   $ Pulse Oximetry - Multiple Charge Pulse Oximetry - Multiple   Aerosol Therapy   $ Aerosol Therapy Charges PRN treatment not required

## 2019-10-21 NOTE — PLAN OF CARE
10/21/19 1546   Final Note   Assessment Type Final Discharge Note   Anticipated Discharge Disposition Psych  (Milly Shaver)

## 2019-10-21 NOTE — ASSESSMENT & PLAN NOTE
Acute --resolved after bicarb gtt .fand ivf -likely 2/2 GE, dehydration, drugs and rhabdo  Follow renal panel and electrolytes closely.  Ctap-no obstructon .  Adjust renal dose medications for Estimated Creatinine Clearance: 119.4 mL/min (based on SCr of 0.7 mg/dL).  Avoid NSAIDs, Reddy-II inhibitors, ACE-I, Angiotensin Receptor Blockers, or Aminoglycosides.  Urine analysis.-mild proteinuria /wbc/trichomonas --treated std  w flagyl  Urine culture neg

## 2019-10-21 NOTE — PROGRESS NOTES
Chief Complaint   Patient presents with   • Sinus Problem         Current Outpatient Medications   Medication Sig Dispense Refill   • LORazepam (ATIVAN) 0.5 MG tablet Take 0.5 mg by mouth every 6 hours as needed for Anxiety.     • amphetamine-dextroamphetamine (ADDERALL XR) 20 MG 24 hr capsule Take 20 mg by mouth daily.     • amphetamine-dextroamphetamine (ADDERALL) 10 MG tablet Take 10 mg by mouth daily.       No current facility-administered medications for this visit.      ALLERGIES:  Allergies no known allergies     URI    This is a new problem. The current episode started in the past 5 days. The problem has been gradually worsening.  The fever was not present. Associated symptoms include congestion, coughing, rhinorrhea, sneezing and a sore throat. He has tried increased fluids, NSAIDs, steam and sleep for the symptoms. The treatment provided mild relief.        Review of Systems   HENT: Positive for congestion, rhinorrhea, sneezing and sore throat.    Respiratory: Positive for cough.        Visit Vitals  /60 (BP Location: [facility] [place], Patient Position: Sitting, Cuff Size: Regular)   Temp 97 °F (36.1 °C)   Ht 5' 9.5\" (1.765 m)   Wt 77.1 kg (170 lb)   BMI 24.74 kg/m²       Physical Exam:    HEENT:   Head: Normocephalic and atraumatic.   Nose: Mucosal edema and rhinorrhea present.   Mouth/Throat: Uvula is midline. Posterior oropharyngeal edema present.   Eyes: Pupils are equal, round, and reactive to light. Right conjunctiva is not injected.   Neck: Neck supple. No tracheal deviation present. No thyromegaly present.   Cardiovascular: Normal rate, regular rhythm and normal heart sounds.   No murmur heard.  Pulmonary/Chest: Breath sounds normal. No respiratory distress. He has no wheezes.    Neurological:   oriented to person, place, and time.   Skin: Skin is warm. No rash noted. No erythema.        Assessment and plan    URI symptoms discussed.  Patient will drink copious amounts of fluids especially water tea and  [facility] Medicine  Progress Note    Patient Name: Eleno Kiran  MRN: [id number]  Patient Class: IP- Inpatient   Admission Date: 10/17/2019  Length of Stay: 4 days  Attending Physician: Lizette Joe MD  Primary Care Provider: Gerhard Sequeira III, MD        Subjective:     Principal Problem:JULIEN (acute kidney injury)  Acute Condition: julien        HPI:  Eleno Kiran is a 33 y/o F who was transferred from [facility] [place] [place] to [facility] with a diagnosis of acute kidney injury.  Pt is alert, but confused, and reporting both auditory and visual hallucinations that were thought to have started around 0800 Wednesday morning after use of ice, heroin and/or cocaine.  Pt was noted to have metabolic acidosis with an anion gap of 22.  She was placed on a bicarb drip but has failed to produce urine.  She was sent to [facility] for the service of nephrology.  Further history is difficult to obtain as pt remains confused and continues to have hallucinations.    Overview/Hospital Course:  34-year-old female admitted for acute kidney injury after polysubstance abuse, dehydration from gastroenteritis, and rhabdomyolysis.  Renal function returned to normal after IV fluids with bicarb drip and normal saline.  She had no further nausea vomiting or diarrhea.  Her electrolytes were monitored and replaced.  Her CPK level trended down.  She was able to tolerate p.o..    She had hallucinations which were mild and resolved with intermittent treatment with Haldol p.r.n..  Physician emergency certificate was continued from her add emergency room stay and Psychiatry consulted.  She reports history of drug use and prior inpatient psychiatric treatment for depression.    Admitted with JULIEN 2/2 gastroenteritis/polysubstance abuse and PEC -2/2 hallucinations. PEC. Julien resolved with hydration and bicarb drip. And uti treated with metronidazole for trichomonas so pt medically cleared for psychiatric care.    Hallucinations treated with haldol and medication regimen instituted by psychiatry.   CM consulted for in patient psych placement.    Interval History:   Pt seen/examined-no new complaints.   No hallucinations  Dysuria resolved     Review of Systems   Constitutional: Negative for chills and fever.   Respiratory: Negative for cough and shortness of breath.    Gastrointestinal: Negative for abdominal pain, diarrhea, nausea and vomiting.   Genitourinary: Negative for dysuria and vaginal discharge.   Psychiatric/Behavioral: Positive for dysphoric mood. Negative for suicidal ideas.     Objective:     Vital Signs (Most Recent):  Temp: 98.2 °F (36.8 °C) (10/20/19 2015)  Pulse: 82 (10/20/19 2015)  Resp: 20 (10/20/19 2015)  BP: (!) 143/88 (10/20/19 2015)  SpO2: 100 % (10/20/19 2015) Vital Signs (24h Range):  Temp:  [98.2 °F (36.8 °C)-98.4 °F (36.9 °C)] 98.2 °F (36.8 °C)  Pulse:  [74-94] 82  Resp:  [20-35] 20  SpO2:  [99 %-100 %] 100 %  BP: (136-143)/(84-88) 143/88     Weight: 81.2 kg (179 lb 0.2 oz)  Body mass index is 28.89 kg/m².  No intake or output data in the 24 hours ending 10/21/19 045   Physical Exam   Constitutional: She is oriented to person, place, and time. She appears well-developed and well-nourished. No distress.   HENT:   Head: Normocephalic and atraumatic.   Nose: Nose normal.   Mouth/Throat: Oropharynx is clear and moist.   Eyes: Conjunctivae are normal. No scleral icterus.   Neck: Neck supple. No JVD present.   Cardiovascular: Normal rate and regular rhythm.   Pulmonary/Chest: Effort normal and breath sounds normal. No respiratory distress.   Abdominal: Soft. Bowel sounds are normal. She exhibits no distension. There is no tenderness.   Genitourinary:   Genitourinary Comments: deferred     Musculoskeletal: Normal range of motion. She exhibits no edema.   Lymphadenopathy:     She has no cervical adenopathy.   Neurological: She is alert and oriented to person, place, and time. GCS eye subscore is 4. GCS  soup.  Ibuprofen or Aleve may be taken if safe --otherwise Tylenol up to 3 times a day.    Gargling and increased humidity as well as saline nasal spray --or if severe congestion ,Nasacort    Follow-up if no significant improvement in 3-5 days or if increased respiratory distress or fever.        verbal subscore is 4. GCS motor subscore is 6.   Skin: Skin is warm and dry. Capillary refill takes less than 2 seconds. No rash noted.   Psychiatric: Her mood appears not anxious. Her speech is not rapid and/or pressured. She is not agitated and not hyperactive. Thought content is not paranoid. Cognition and memory are not impaired. She does not express impulsivity. She exhibits a depressed mood. She expresses no homicidal and no suicidal ideation. She expresses no suicidal plans and no homicidal plans.   Nursing note and vitals reviewed.      Significant Labs: All pertinent labs within the past 24 hours have been reviewed.    Significant Imaging: I have reviewed and interpreted all pertinent imaging results/findings within the past 24 hours.      Assessment/Plan:      * JULIEN (acute kidney injury)  Acute --resolved after bicarb gtt .fand ivf -likely 2/2 GE, dehydration, drugs and rhabdo  Follow renal panel and electrolytes closely.  Ctap-no obstructon .  Adjust renal dose medications for Estimated Creatinine Clearance: 119.4 mL/min (based on SCr of 0.7 mg/dL).  Avoid NSAIDs, Reddy-II inhibitors, ACE-I, Angiotensin Receptor Blockers, or Aminoglycosides.  Urine analysis.-mild proteinuria /wbc/trichomonas --treated std  w flagyl  Urine culture neg                   Encephalopathy, metabolic  Patient with encephalopathy due to julien, substance abuse /mental illness . Treatment for illness causative of encephalopathy is under way. Encephalopathy noted as a secondary process related to the patient's acute illness. There is no specific treatment. Monitor neuro status, avoid medications such as narcotics and benzos which may worsen confusion, and use anti-psychotics to prevent behaviors of self harm.  Ct head neg  Resolved       Trichomonas vaginitis  Acute -noted on ua  Treated with metronidazole       Hypophosphatemia  Acute liekly 2/2 acute gi illness/decreased po intake  Replace and monitor        Gastroenteritis  Acute-improving clinical bases  -clear liquid deit       Non-traumatic rhabdomyolysis  Acute , resolving -ok to dc ivf and continue to trend   Monitored renal function which has now normalized       Polysubstance abuse  Hx cannabis/spice abuse with admit hx of Ice, cocaine and heroin.  Discussed th deleterious effects of substance abuse.        Hallucination, drug-induced  Pt currently PEC'd.  Auditory and visual hallucinations following possible use of ICE, heroin and cocaine.  Monitor for s/s of withdrawal.  Denies Si/HI  Psychiatry consulted --await eval   Hallucinations improved with  haldol prn   Pt reports hx depression/inpatient psychiatric treatment-          Metabolic acidosis  anion-gap metabolic acidosis with bicarb 16 and gap 22, placed on bicarb drip and having minimal urine output.  Nephrology consulted.  Monitor I&O's  BMP, Mg, phos, cbc daily.        VTE Risk Mitigation (From admission, onward)         Ordered     IP VTE HIGH RISK PATIENT  Once      10/17/19 2054     Place ANNALISE hose  Until discontinued      10/17/19 2054     Place sequential compression device  Until discontinued      10/17/19 2054              Medically cleared for inpatient psychiatric care.       Manda Guadarrama MD  Department of Hospital Medicine   [facility]

## 2019-10-21 NOTE — ASSESSMENT & PLAN NOTE
-resolved  -likely 2/2 GE, dehydration, drugs and rhabdo    Adjust renal dose medications for Estimated Creatinine Clearance: 119.4 mL/min (based on SCr of 0.7 mg/dL).  Avoid NSAIDs, Reddy-II inhibitors, ACE-I, Angiotensin Receptor Blockers, or Aminoglycosides.  Urine analysis.-mild proteinuria /wbc/trichomonas --treated std  w flagyl  Urine culture neg

## 2019-10-21 NOTE — PLAN OF CARE
10/21/19 1003   Post-Acute Status   Post-Acute Authorization Placement   Post-Acute Placement Status Pending Post-Acute Medical Review   CM spoke to Emy with [facility], she received packet and is working on it. CM will follow.     1200 Emy with [facility] unable to place. CM sent referrals manually via fax and spoke to admissions at each facility . They will review and CM will followup:  [facility]  [phone]  [facility] [phone] Fax [phone]  [facility] [phone] Fax [phone]  [facility] [phone] Fax [phone]    1230pm [facility] can not take because payor is [place] medicaid.     155pm CM sent referral to [facility]  [phone] fax [phone] and spoke to surya. She will review. CM will follow for acceptance.     305pm SHAISTA just heard back from Linnette with [facility], Linnette needed some lab result information and is checking with the MD to see if they can accept.     320pm CM received a call from ICU nurse , Renetta, patient is aking if she could be sent to [facility] [place]. CM called and spoke to oren in intake @ [phone], they do have inpt psych but is a court ordered facility.    330pm Linnette with [facility] called back with acceptance- Dr. Evan Palmer will accept patient  Facility address is : [address] [place] [address], please call report to phone #  [phone]. CM informed ICU nurse and will set up [facility] P[facility] transport for patient once ICU nurse calls report. CM will follow.     430pm Oneida in ICU asked for [facility] form and she will call in transport ticket. CM gave her completed  [facility] form  And she will call for transport.

## 2019-10-21 NOTE — ASSESSMENT & PLAN NOTE
Patient with encephalopathy due to jennifer, substance abuse /mental illness . Treatment for illness causative of encephalopathy is under way. Encephalopathy noted as a secondary process related to the patient's acute illness. There is no specific treatment. Monitor neuro status, avoid medications such as narcotics and benzos which may worsen confusion, and use anti-psychotics to prevent behaviors of self harm.  Ct head neg  Resolved

## 2019-10-21 NOTE — SUBJECTIVE & OBJECTIVE
Interval History:   Pt seen/examined-no new complaints.   No hallucinations  Dysuria resolved     Review of Systems   Constitutional: Negative for chills and fever.   Respiratory: Negative for cough and shortness of breath.    Gastrointestinal: Negative for abdominal pain, diarrhea, nausea and vomiting.   Genitourinary: Negative for dysuria and vaginal discharge.   Psychiatric/Behavioral: Positive for dysphoric mood. Negative for suicidal ideas.     Objective:     Vital Signs (Most Recent):  Temp: 98.2 °F (36.8 °C) (10/20/19 2015)  Pulse: 82 (10/20/19 2015)  Resp: 20 (10/20/19 2015)  BP: (!) 143/88 (10/20/19 2015)  SpO2: 100 % (10/20/19 2015) Vital Signs (24h Range):  Temp:  [98.2 °F (36.8 °C)-98.4 °F (36.9 °C)] 98.2 °F (36.8 °C)  Pulse:  [74-94] 82  Resp:  [20-35] 20  SpO2:  [99 %-100 %] 100 %  BP: (136-143)/(84-88) 143/88     Weight: 81.2 kg (179 lb 0.2 oz)  Body mass index is 28.89 kg/m².  No intake or output data in the 24 hours ending 10/21/19 8742   Physical Exam   Constitutional: She is oriented to person, place, and time. She appears well-developed and well-nourished. No distress.   HENT:   Head: Normocephalic and atraumatic.   Nose: Nose normal.   Mouth/Throat: Oropharynx is clear and moist.   Eyes: Conjunctivae are normal. No scleral icterus.   Neck: Neck supple. No JVD present.   Cardiovascular: Normal rate and regular rhythm.   Pulmonary/Chest: Effort normal and breath sounds normal. No respiratory distress.   Abdominal: Soft. Bowel sounds are normal. She exhibits no distension. There is no tenderness.   Genitourinary:   Genitourinary Comments: deferred     Musculoskeletal: Normal range of motion. She exhibits no edema.   Lymphadenopathy:     She has no cervical adenopathy.   Neurological: She is alert and oriented to person, place, and time. GCS eye subscore is 4. GCS verbal subscore is 4. GCS motor subscore is 6.   Skin: Skin is warm and dry. Capillary refill takes less than 2 seconds. No rash noted.    Psychiatric: Her mood appears not anxious. Her speech is not rapid and/or pressured. She is not agitated and not hyperactive. Thought content is not paranoid. Cognition and memory are not impaired. She does not express impulsivity. She exhibits a depressed mood. She expresses no homicidal and no suicidal ideation. She expresses no suicidal plans and no homicidal plans.   Nursing note and vitals reviewed.      Significant Labs: All pertinent labs within the past 24 hours have been reviewed.    Significant Imaging: I have reviewed and interpreted all pertinent imaging results/findings within the past 24 hours.

## 2019-10-21 NOTE — PHYSICIAN QUERY
"PT Name: Eleno Kiran  MR #: [id number]    Physician Query Form - Hematology Clarification      CDS/: Arlette Taveras RN              Contact information: [phone]    This form is a permanent document in the medical record.      Query Date: October 21, 2019    By submitting this query, we are merely seeking further clarification of documentation. Please utilize your independent clinical judgment when addressing the question(s) below.    The Medical record contains the following:   Indicators  Supporting Clinical Findings Location in Medical Record   x "Anemia" documented Anemia  Hgb and HCT are acceptable. Monitor. 10/18 Renal consult   x H & H = H/H= 9.8/ 28.8   H/H= 10.0/ 29.7 10/19-Lab  10/20 Lab   x BP =                     HR= BP= 134/86   HR= 93 10/21 Flowsheet    "GI bleeding" documented      Acute bleeding (Non GI site)      Transfusion(s)     x Treatment: CBC   10/18 NSG orders   x Other:  CKD 3     Hb is stable. Add iron studies. Ordered daily supplementation    Ferrous sulfate 325mg PO      Iron 131   TIBC 296   Saturated Iron 44   Transferrin 200   Ferritin 69      10/18 Renal consult      10/20 Renal PN    10/20-21 MAR      10/20 Lab     Provider, please specify diagnosis or diagnoses associated with above clinical findings.    Terry all that apply    [  ] Iron deficiency anemia   [  ] Chronic blood loss anemia     [  ] Anemia of chronic disease ( Specify chronic disease)       [  ] CKD (specify stage):   [  ] Other (Specify):   [  ] Clinically Undetermined       [  ] Other Hematological Diagnosis (please specify):     [ x ] Clinically Undetermined       Please document in your progress notes daily for the duration of treatment, until resolved, and include in your discharge summary.                                                                                                      "

## 2019-10-21 NOTE — CARE UPDATE
10/21/19 0657   Patient Assessment/Suction   Level of Consciousness (AVPU) alert   PRE-TX-O2   O2 Device (Oxygen Therapy) room air   SpO2 100 %   Pulse Oximetry Type Intermittent   $ Pulse Oximetry - Multiple Charge Pulse Oximetry - Multiple   Aerosol Therapy   $ Aerosol Therapy Charges PRN treatment not required

## 2019-10-21 NOTE — SUBJECTIVE & OBJECTIVE
Interval History:  The patient is doing well and has no complaints    Review of Systems   Respiratory: Negative for cough, chest tightness, shortness of breath and wheezing.    Cardiovascular: Negative for chest pain, palpitations and leg swelling.   Gastrointestinal: Negative for abdominal distention, abdominal pain, constipation, diarrhea, nausea and vomiting.   Genitourinary: Negative for difficulty urinating, dysuria and flank pain.   Musculoskeletal: Negative for gait problem, joint swelling and neck pain.   Skin: Negative for rash and wound.   Neurological: Negative for dizziness, syncope, light-headedness and headaches.   Psychiatric/Behavioral: Negative for agitation, behavioral problems and confusion.     Objective:     Vital Signs (Most Recent):  Temp: 98.3 °F (36.8 °C) (10/21/19 0815)  Pulse: 93 (10/21/19 0815)  Resp: 20 (10/21/19 0815)  BP: 134/86 (10/21/19 0815)  SpO2: 100 % (10/21/19 0815) Vital Signs (24h Range):  Temp:  [98.2 °F (36.8 °C)-98.3 °F (36.8 °C)] 98.3 °F (36.8 °C)  Pulse:  [82-93] 93  Resp:  [20] 20  SpO2:  [100 %] 100 %  BP: (134-143)/(86-88) 134/86     Weight: 81.2 kg (179 lb 0.2 oz)  Body mass index is 28.89 kg/m².  No intake or output data in the 24 hours ending 10/21/19 1221   Physical Exam   Constitutional: She is oriented to person, place, and time. She appears well-developed and well-nourished.   HENT:   Head: Normocephalic and atraumatic.   Eyes: Pupils are equal, round, and reactive to light. EOM are normal.   Neck: Normal range of motion. Neck supple. No JVD present.   Cardiovascular: Normal rate, regular rhythm, normal heart sounds and intact distal pulses.   Pulmonary/Chest: Effort normal and breath sounds normal. No respiratory distress. She has no rales.   Abdominal: Soft. Bowel sounds are normal. She exhibits no distension. There is no tenderness. There is no rebound.   Musculoskeletal: Normal range of motion. She exhibits no edema or deformity.   Neurological: She is alert  and oriented to person, place, and time. No cranial nerve deficit or sensory deficit.   Skin: Skin is warm and dry. No rash noted.   Psychiatric: She has a normal mood and affect. Her behavior is normal.   Nursing note and vitals reviewed.      Significant Labs:   BMP:   Recent Labs   Lab 10/20/19  0419   GLU 87      K 3.5      CO2 22*   BUN 7   CREATININE 0.7   CALCIUM 8.8   MG 1.7     CBC:   Recent Labs   Lab 10/20/19  0419   WBC 6.11   HGB 10.0*   HCT 29.7*          Significant Imaging: I have reviewed all pertinent imaging results/findings within the past 24 hours.

## 2019-10-21 NOTE — DISCHARGE SUMMARY
[facility] Medicine  Discharge Summary      Patient Name: Eleno Kiran  MRN: [id number]  Admission Date: 10/17/2019  Hospital Length of Stay: 4 days  Discharge Date and Time:  10/21/2019 3:40 PM  Attending Physician: Lizette Joe MD   Discharging Provider: Lizette Joe MD  Primary Care Provider: Gerhard Sequeira III, MD      HPI:   Eleno Kiran is a 35 y/o F who was transferred from [facility] to [facility] with a diagnosis of acute kidney injury.  Pt is alert, but confused, and reporting both auditory and visual hallucinations that were thought to have started around 0800 Wednesday morning after use of ice, heroin and/or cocaine.  Pt was noted to have metabolic acidosis with an anion gap of 22.  She was placed on a bicarb drip but has failed to produce urine.  She was sent to [facility] for the service of nephrology.  Further history is difficult to obtain as pt remains confused and continues to have hallucinations.        Hospital Course:   34-year-old female admitted for acute kidney injury after polysubstance abuse, dehydration from gastroenteritis, and rhabdomyolysis.  Renal function returned to normal after IV fluids with bicarb drip.  She had no further nausea vomiting or diarrhea.  Her electrolytes were monitored and replaced.  Her CPK level trended down.  She was able to tolerate p.o..    She is on Cipro for urinary tract infection.  She was treated with Flagyl for trichomoniasis.  She is on vitamin-D supplements to treat significant vitamin D deficiency.    She has a normochromic, normocytic anemia which will need follow-up.    We obtained a CT scan of the brain on admission which was normal.  Renal ultrasound was also normal.    She had hallucinations which were mild and resolved with intermittent treatment with Haldol p.r.n..  Physician emergency certificate was continued from  emergency room stay and Psychiatry consulted.  She reports history of drug  use and prior inpatient psychiatric treatment for depression.  Psychiatry recommended inpatient treatment. The patient will be discharged today.     Consults:   Consults (From admission, onward)        Status Ordering Provider     Inpatient consult to Nephrology  Once     Provider:  Pee Jesus MD    Completed ALISE SANTIAGO     Inpatient consult to Psychiatry  Once     Provider:  Allison Weldon MD    Acknowledged ALISE SANTIAGO     Inpatient consult to Social Work/Case Management  Once     Provider:  (Not yet assigned)    Completed ALISE SANTIAGO     IP consult to case management  Once     Provider:  (Not yet assigned)    Completed ALISE SANTIAGO            Final Active Diagnoses:    Diagnosis Date Noted POA    PRINCIPAL PROBLEM:  JULIEN (acute kidney injury) [N17.9] 10/17/2019 Yes    UTI (urinary tract infection) [N39.0] 10/21/2019 Yes    Trichomonas vaginitis [A59.01] 10/19/2019 Yes    Hallucination, drug-induced [F19.951] 10/18/2019 Yes    Polysubstance abuse [F19.10] 10/18/2019 Yes    Non-traumatic rhabdomyolysis [M62.82] 10/18/2019 Yes    Gastroenteritis [K52.9] 10/18/2019 Yes      Problems Resolved During this Admission:    Diagnosis Date Noted Date Resolved POA    Encephalopathy, metabolic [G93.41] 10/19/2019 10/21/2019 Yes    Metabolic acidosis [E87.2] 10/18/2019 10/21/2019 Yes    Hypokalemia [E87.6] 10/18/2019 10/21/2019 No    Hypophosphatemia [E83.39] 10/18/2019 10/21/2019 No       Discharged Condition: stable    Disposition:  In-patient psychiatric facility    Patient Instructions:      Diet Adult Regular     Activity as tolerated       Significant Diagnostic Studies: Labs:   BMP:   Recent Labs   Lab 10/20/19  0419   GLU 87      K 3.5      CO2 22*   BUN 7   CREATININE 0.7   CALCIUM 8.8   MG 1.7    and CBC   Recent Labs   Lab 10/20/19  0419   WBC 6.11   HGB 10.0*   HCT 29.7*          Pending Diagnostic Studies:     None         Medications:  Reconciled Home  Medications:      Medication List      START taking these medications    cholecalciferol (vitamin D3) 1,000 unit capsule  Commonly known as:  VITAMIN D3  Take 5 capsules (5,000 Units total) by mouth once daily.     ciprofloxacin HCl 500 MG tablet  Commonly known as:  CIPRO  Take 1 tablet (500 mg total) by mouth every 12 (twelve) hours. for 5 days            Indwelling Lines/Drains at time of discharge:   Lines/Drains/Airways     None                 Time spent on the discharge of patient: 30 minutes  Patient was seen and examined on the date of discharge and determined to be suitable for discharge.      Lizette Joe MD  Department of Hospital Medicine  [facility]

## 2019-10-22 ENCOUNTER — TELEPHONE (OUTPATIENT)
Dept: MEDSURG UNIT | Facility: HOSPITAL | Age: 34
End: 2019-10-22

## 2019-10-22 NOTE — NURSING
2001 Contacted \[facility]\"" for ETA for . Spoke with [employer]. Informed that they were answering another call and would be headed to the facility after the call has been completed.

## 2019-10-22 NOTE — PLAN OF CARE
10/21/19 1950   Patient Assessment/Suction   Level of Consciousness (AVPU) alert   Respiratory Effort Unlabored   PRE-TX-O2   O2 Device (Oxygen Therapy) room air   SpO2 95 %   Pulse Oximetry Type Intermittent   $ Pulse Oximetry - Multiple Charge Pulse Oximetry - Multiple   Aerosol Therapy   $ Aerosol Therapy Charges PRN treatment not required   Respiratory Treatment Status (SVN) PRN treatment not required

## 2019-10-22 NOTE — NURSING
Patient taken to transport per wheelchair per nurse, 2 person security and 2 person transport. Patient alert and orient at this time complains of no pain or discomfort. No signs or symptoms of distress. Safety intact. Report called to [facility] in [place], [place]. Spoke with [facility]. Patient reports no belongings to be with her at time of admission.

## 2020-02-09 ENCOUNTER — HOSPITAL ENCOUNTER (EMERGENCY)
Facility: HOSPITAL | Age: 35
Discharge: HOME | End: 2020-02-09
Payer: MEDICAID

## 2020-02-09 VITALS
RESPIRATION RATE: 16 BRPM | BODY MASS INDEX: 31.98 KG/M2 | OXYGEN SATURATION: 96 % | HEART RATE: 101 BPM | SYSTOLIC BLOOD PRESSURE: 104 MMHG | TEMPERATURE: 99 F | HEIGHT: 66 IN | WEIGHT: 199 LBS | DIASTOLIC BLOOD PRESSURE: 66 MMHG

## 2020-02-09 DIAGNOSIS — N12 PYELONEPHRITIS: Primary | ICD-10-CM

## 2020-02-09 LAB
BACTERIA #/AREA URNS HPF: ABNORMAL /HPF
BILIRUB UR QL STRIP: NEGATIVE
CLARITY UR: ABNORMAL
COLOR UR: ABNORMAL
GLUCOSE UR QL STRIP: NEGATIVE
HGB UR QL STRIP: ABNORMAL
HYALINE CASTS #/AREA URNS LPF: 2 /LPF
KETONES UR QL STRIP: NEGATIVE
LEUKOCYTE ESTERASE UR QL STRIP: ABNORMAL
MICROSCOPIC COMMENT: ABNORMAL
NITRITE UR QL STRIP: POSITIVE
PH UR STRIP: 7 [PH] (ref 5–8)
PROT UR QL STRIP: ABNORMAL
RBC #/AREA URNS HPF: >100 /HPF (ref 0–4)
SP GR UR STRIP: 1.02 (ref 1–1.03)
SQUAMOUS #/AREA URNS HPF: 3 /HPF
URN SPEC COLLECT METH UR: ABNORMAL
UROBILINOGEN UR STRIP-ACNC: NEGATIVE EU/DL
WBC #/AREA URNS HPF: >100 /HPF (ref 0–5)

## 2020-02-09 PROCEDURE — 87086 URINE CULTURE/COLONY COUNT: CPT

## 2020-02-09 PROCEDURE — 81000 URINALYSIS NONAUTO W/SCOPE: CPT

## 2020-02-09 PROCEDURE — 87186 SC STD MICRODIL/AGAR DIL: CPT

## 2020-02-09 PROCEDURE — 87088 URINE BACTERIA CULTURE: CPT

## 2020-02-09 PROCEDURE — 25000003 PHARM REV CODE 250: Performed by: NURSE PRACTITIONER

## 2020-02-09 PROCEDURE — 87077 CULTURE AEROBIC IDENTIFY: CPT

## 2020-02-09 PROCEDURE — 99283 EMERGENCY DEPT VISIT LOW MDM: CPT

## 2020-02-09 RX ORDER — SULFAMETHOXAZOLE AND TRIMETHOPRIM 800; 160 MG/1; MG/1
1 TABLET ORAL 2 TIMES DAILY
Status: DISCONTINUED | OUTPATIENT
Start: 2020-02-09 | End: 2020-02-09

## 2020-02-09 RX ORDER — SULFAMETHOXAZOLE AND TRIMETHOPRIM 800; 160 MG/1; MG/1
1 TABLET ORAL
Status: COMPLETED | OUTPATIENT
Start: 2020-02-09 | End: 2020-02-09

## 2020-02-09 RX ORDER — SULFAMETHOXAZOLE AND TRIMETHOPRIM 800; 160 MG/1; MG/1
1 TABLET ORAL 2 TIMES DAILY
Qty: 14 TABLET | Refills: 0 | Status: SHIPPED | OUTPATIENT
Start: 2020-02-09 | End: 2020-02-16

## 2020-02-09 RX ADMIN — SULFAMETHOXAZOLE AND TRIMETHOPRIM 1 TABLET: 800; 160 TABLET ORAL at 10:02

## 2020-02-10 NOTE — ED PROVIDER NOTES
Encounter Date: 2020       History     Chief Complaint   Patient presents with    Urinary Tract Infection     Urinary urgency and hesitancy with dysuria onset x 4 days.          Review of patient's allergies indicates:  No Known Allergies  History reviewed. No pertinent past medical history.  Past Surgical History:   Procedure Laterality Date     SECTION       Family History   Family history unknown: Yes     Social History     Tobacco Use    Smoking status: Former Smoker    Smokeless tobacco: Never Used   Substance Use Topics    Alcohol use: Not Currently    Drug use: Yes     Types: Marijuana, Methamphetamines, Amphetamines     Review of Systems   Constitutional: Negative.    HENT: Negative.    Eyes: Negative.    Respiratory: Negative.    Cardiovascular: Negative.    Gastrointestinal: Positive for nausea.   Endocrine: Negative.    Genitourinary: Positive for dysuria, hematuria and urgency.   Musculoskeletal: Negative.    Skin: Negative.    Allergic/Immunologic: Negative.    Hematological: Negative.    Psychiatric/Behavioral: Negative.    All other systems reviewed and are negative.      Physical Exam     Initial Vitals [207]   BP Pulse Resp Temp SpO2   104/66 101 16 98.9 °F (37.2 °C) 96 %      MAP       --         Physical Exam    Nursing note and vitals reviewed.  Constitutional: She appears well-developed and well-nourished.   HENT:   Head: Normocephalic and atraumatic.   Nose: Nose normal.   Eyes: Conjunctivae and EOM are normal. Pupils are equal, round, and reactive to light.   Neck: Normal range of motion.   Cardiovascular: Normal rate, regular rhythm, normal heart sounds and intact distal pulses.   Pulmonary/Chest: Breath sounds normal.   Abdominal: Soft. There is tenderness.   Suprapubic tenderness   Musculoskeletal: Normal range of motion. She exhibits tenderness.   CVA tenderness to percussion.    Neurological: She is alert and oriented to person, place, and time. She has normal  strength. GCS score is 15. GCS eye subscore is 4. GCS verbal subscore is 5. GCS motor subscore is 6.   Skin: Skin is warm and dry. Capillary refill takes 2 to 3 seconds.   Psychiatric: She has a normal mood and affect. Her behavior is normal. Judgment and thought content normal.         ED Course   Procedures  Labs Reviewed   URINALYSIS, REFLEX TO URINE CULTURE          Imaging Results    None          Medical Decision Making:   Initial Assessment:   Reports bilateral back pain with tenderness to percussion, suprapubic tenderness with dysuria, nausea x 2 days without vomiting.  All other systems normal by exam, denies dyspareunia.    Differential Diagnosis:   Cystitis, pyelonephritis, back pain, urgency, STD  Clinical Tests:   Lab Tests: Ordered and Reviewed       <> Summary of Lab: UA nitrite positive, leuk +, heme positive  ED Management:  Given her flank pain and the length of her symptoms I will treat her with bactrim for her WBC positive/nitrite positive urine                                 Clinical Impression:       ICD-10-CM ICD-9-CM   1. Pyelonephritis N12 590.80                             Nacho Ferrer NP  02/09/20 [id number]

## 2020-02-10 NOTE — DISCHARGE INSTRUCTIONS
Increase fluids, loose jeans, cotton underwear, take all the antibiotics, Azo-standard 2 times daily for dysuria.

## 2020-02-12 LAB — BACTERIA UR CULT: ABNORMAL

## 2020-03-05 ENCOUNTER — TELEPHONE (OUTPATIENT)
Dept: EMERGENCY MEDICINE | Facility: HOSPITAL | Age: 35
End: 2020-03-05

## 2020-03-05 NOTE — TELEPHONE ENCOUNTER
Attempted to call patient to follow up on left before being triaged. Voicemail full. Not able to leave message.

## 2020-03-18 ENCOUNTER — HOSPITAL ENCOUNTER (OUTPATIENT)
Facility: HOSPITAL | Age: 35
Discharge: HOME | End: 2020-03-20
Attending: EMERGENCY MEDICINE | Admitting: INTERNAL MEDICINE
Payer: MEDICAID

## 2020-03-18 DIAGNOSIS — R07.9 CHEST PAIN: ICD-10-CM

## 2020-03-18 DIAGNOSIS — N20.1 URETEROLITHIASIS: Primary | ICD-10-CM

## 2020-03-18 DIAGNOSIS — N12 PYELONEPHRITIS: ICD-10-CM

## 2020-03-18 DIAGNOSIS — N13.30 HYDROURETERONEPHROSIS: ICD-10-CM

## 2020-03-18 PROBLEM — N20.0 NEPHROLITHIASIS: Status: ACTIVE | Noted: 2020-03-18

## 2020-03-18 PROBLEM — F14.10 COCAINE ABUSE: Status: ACTIVE | Noted: 2020-03-18

## 2020-03-18 PROBLEM — F14.90 COCAINE USE: Status: ACTIVE | Noted: 2020-03-18

## 2020-03-18 LAB
AMPHET+METHAMPHET UR QL: NEGATIVE
ANION GAP SERPL CALC-SCNC: 14 MMOL/L (ref 8–16)
B-HCG UR QL: NEGATIVE
BARBITURATES UR QL SCN>200 NG/ML: NEGATIVE
BASOPHILS # BLD AUTO: 0.02 K/UL (ref 0–0.2)
BASOPHILS NFR BLD: 0.3 % (ref 0–1.9)
BENZODIAZ UR QL SCN>200 NG/ML: NEGATIVE
BILIRUB UR QL STRIP: ABNORMAL
BUN SERPL-MCNC: 9 MG/DL (ref 6–20)
BZE UR QL SCN: NORMAL
CALCIUM SERPL-MCNC: 9.8 MG/DL (ref 8.7–10.5)
CANNABINOIDS UR QL SCN: NEGATIVE
CHLORIDE SERPL-SCNC: 108 MMOL/L (ref 95–110)
CLARITY UR: CLEAR
CO2 SERPL-SCNC: 19 MMOL/L (ref 23–29)
COLOR UR: YELLOW
CREAT SERPL-MCNC: 1 MG/DL (ref 0.5–1.4)
CREAT UR-MCNC: 269.2 MG/DL (ref 15–325)
CTP QC/QA: YES
DIFFERENTIAL METHOD: ABNORMAL
EOSINOPHIL # BLD AUTO: 0 K/UL (ref 0–0.5)
EOSINOPHIL NFR BLD: 0.4 % (ref 0–8)
ERYTHROCYTE [DISTWIDTH] IN BLOOD BY AUTOMATED COUNT: 13.3 % (ref 11.5–14.5)
EST. GFR  (AFRICAN AMERICAN): >60 ML/MIN/1.73 M^2
EST. GFR  (NON AFRICAN AMERICAN): >60 ML/MIN/1.73 M^2
GLUCOSE SERPL-MCNC: 106 MG/DL (ref 70–110)
GLUCOSE UR QL STRIP: NEGATIVE
HCT VFR BLD AUTO: 39.5 % (ref 37–48.5)
HGB BLD-MCNC: 12.8 G/DL (ref 12–16)
HGB UR QL STRIP: NEGATIVE
IMM GRANULOCYTES # BLD AUTO: 0.02 K/UL (ref 0–0.04)
IMM GRANULOCYTES NFR BLD AUTO: 0.3 % (ref 0–0.5)
KETONES UR QL STRIP: ABNORMAL
LEUKOCYTE ESTERASE UR QL STRIP: NEGATIVE
LYMPHOCYTES # BLD AUTO: 1.5 K/UL (ref 1–4.8)
LYMPHOCYTES NFR BLD: 20.5 % (ref 18–48)
MCH RBC QN AUTO: 32.7 PG (ref 27–31)
MCHC RBC AUTO-ENTMCNC: 32.4 G/DL (ref 32–36)
MCV RBC AUTO: 101 FL (ref 82–98)
METHADONE UR QL SCN>300 NG/ML: NEGATIVE
MONOCYTES # BLD AUTO: 0.5 K/UL (ref 0.3–1)
MONOCYTES NFR BLD: 7.1 % (ref 4–15)
NEUTROPHILS # BLD AUTO: 5.3 K/UL (ref 1.8–7.7)
NEUTROPHILS NFR BLD: 71.4 % (ref 38–73)
NITRITE UR QL STRIP: NEGATIVE
NRBC BLD-RTO: 0 /100 WBC
OPIATES UR QL SCN: NEGATIVE
PCP UR QL SCN>25 NG/ML: NEGATIVE
PH UR STRIP: 6 [PH] (ref 5–8)
PLATELET # BLD AUTO: 244 K/UL (ref 150–350)
PMV BLD AUTO: 10.4 FL (ref 9.2–12.9)
POTASSIUM SERPL-SCNC: 3.6 MMOL/L (ref 3.5–5.1)
PROT UR QL STRIP: ABNORMAL
RBC # BLD AUTO: 3.92 M/UL (ref 4–5.4)
SODIUM SERPL-SCNC: 141 MMOL/L (ref 136–145)
SP GR UR STRIP: 1.02 (ref 1–1.03)
TOXICOLOGY INFORMATION: NORMAL
URN SPEC COLLECT METH UR: ABNORMAL
UROBILINOGEN UR STRIP-ACNC: NEGATIVE EU/DL
WBC # BLD AUTO: 7.46 K/UL (ref 3.9–12.7)

## 2020-03-18 PROCEDURE — 81003 URINALYSIS AUTO W/O SCOPE: CPT | Mod: 59

## 2020-03-18 PROCEDURE — 99285 EMERGENCY DEPT VISIT HI MDM: CPT | Mod: 25

## 2020-03-18 PROCEDURE — 81025 URINE PREGNANCY TEST: CPT | Performed by: EMERGENCY MEDICINE

## 2020-03-18 PROCEDURE — G0378 HOSPITAL OBSERVATION PER HR: HCPCS

## 2020-03-18 PROCEDURE — 80307 DRUG TEST PRSMV CHEM ANLYZR: CPT

## 2020-03-18 PROCEDURE — 63600175 PHARM REV CODE 636 W HCPCS: Performed by: EMERGENCY MEDICINE

## 2020-03-18 PROCEDURE — 36415 COLL VENOUS BLD VENIPUNCTURE: CPT

## 2020-03-18 PROCEDURE — 96375 TX/PRO/DX INJ NEW DRUG ADDON: CPT

## 2020-03-18 PROCEDURE — 85025 COMPLETE CBC W/AUTO DIFF WBC: CPT

## 2020-03-18 PROCEDURE — 80048 BASIC METABOLIC PNL TOTAL CA: CPT

## 2020-03-18 PROCEDURE — 25500020 PHARM REV CODE 255: Performed by: EMERGENCY MEDICINE

## 2020-03-18 PROCEDURE — 96374 THER/PROPH/DIAG INJ IV PUSH: CPT

## 2020-03-18 PROCEDURE — 96361 HYDRATE IV INFUSION ADD-ON: CPT

## 2020-03-18 RX ORDER — MORPHINE SULFATE 4 MG/ML
4 INJECTION, SOLUTION INTRAMUSCULAR; INTRAVENOUS
Status: COMPLETED | OUTPATIENT
Start: 2020-03-18 | End: 2020-03-18

## 2020-03-18 RX ORDER — HALOPERIDOL 5 MG/ML
5 INJECTION INTRAMUSCULAR
Status: COMPLETED | OUTPATIENT
Start: 2020-03-18 | End: 2020-03-18

## 2020-03-18 RX ORDER — KETOROLAC TROMETHAMINE 30 MG/ML
15 INJECTION, SOLUTION INTRAMUSCULAR; INTRAVENOUS
Status: COMPLETED | OUTPATIENT
Start: 2020-03-18 | End: 2020-03-18

## 2020-03-18 RX ADMIN — IOHEXOL 100 ML: 350 INJECTION, SOLUTION INTRAVENOUS at 08:03

## 2020-03-18 RX ADMIN — SODIUM CHLORIDE 1000 ML: 0.9 INJECTION, SOLUTION INTRAVENOUS at 07:03

## 2020-03-18 RX ADMIN — CEFTRIAXONE 1 G: 1 INJECTION, SOLUTION INTRAVENOUS at 09:03

## 2020-03-18 RX ADMIN — MORPHINE SULFATE 4 MG: 4 INJECTION, SOLUTION INTRAMUSCULAR; INTRAVENOUS at 09:03

## 2020-03-18 RX ADMIN — KETOROLAC TROMETHAMINE 15 MG: 30 INJECTION, SOLUTION INTRAMUSCULAR at 07:03

## 2020-03-18 RX ADMIN — HALOPERIDOL LACTATE 5 MG: 5 INJECTION, SOLUTION INTRAMUSCULAR at 07:03

## 2020-03-18 NOTE — ED NOTES
C/o ongoing left flank pain x2 weeks, with nausea vomited at triage. Crying and moaning with pain not able to sit still. Alert call light in reach aware to notify nurse of needs or concerns.

## 2020-03-19 ENCOUNTER — ANESTHESIA EVENT (OUTPATIENT)
Dept: SURGERY | Facility: HOSPITAL | Age: 35
End: 2020-03-19
Payer: MEDICAID

## 2020-03-19 ENCOUNTER — ANESTHESIA (OUTPATIENT)
Dept: SURGERY | Facility: HOSPITAL | Age: 35
End: 2020-03-19
Payer: MEDICAID

## 2020-03-19 LAB
ALBUMIN SERPL BCP-MCNC: 3.7 G/DL (ref 3.5–5.2)
ALP SERPL-CCNC: 51 U/L (ref 55–135)
ALT SERPL W/O P-5'-P-CCNC: 8 U/L (ref 10–44)
ANION GAP SERPL CALC-SCNC: 12 MMOL/L (ref 8–16)
AST SERPL-CCNC: 17 U/L (ref 10–40)
BASOPHILS # BLD AUTO: 0.01 K/UL (ref 0–0.2)
BASOPHILS NFR BLD: 0.1 % (ref 0–1.9)
BILIRUB SERPL-MCNC: 0.4 MG/DL (ref 0.1–1)
BUN SERPL-MCNC: 8 MG/DL (ref 6–20)
CALCIUM SERPL-MCNC: 9 MG/DL (ref 8.7–10.5)
CHLORIDE SERPL-SCNC: 110 MMOL/L (ref 95–110)
CO2 SERPL-SCNC: 16 MMOL/L (ref 23–29)
CREAT SERPL-MCNC: 1.2 MG/DL (ref 0.5–1.4)
DIFFERENTIAL METHOD: ABNORMAL
EOSINOPHIL # BLD AUTO: 0 K/UL (ref 0–0.5)
EOSINOPHIL NFR BLD: 0.1 % (ref 0–8)
ERYTHROCYTE [DISTWIDTH] IN BLOOD BY AUTOMATED COUNT: 13.7 % (ref 11.5–14.5)
EST. GFR  (AFRICAN AMERICAN): >60 ML/MIN/1.73 M^2
EST. GFR  (NON AFRICAN AMERICAN): 58 ML/MIN/1.73 M^2
GLUCOSE SERPL-MCNC: 94 MG/DL (ref 70–110)
HCT VFR BLD AUTO: 36.1 % (ref 37–48.5)
HGB BLD-MCNC: 11.7 G/DL (ref 12–16)
IMM GRANULOCYTES # BLD AUTO: 0.02 K/UL (ref 0–0.04)
IMM GRANULOCYTES NFR BLD AUTO: 0.2 % (ref 0–0.5)
LYMPHOCYTES # BLD AUTO: 1.5 K/UL (ref 1–4.8)
LYMPHOCYTES NFR BLD: 18.3 % (ref 18–48)
MAGNESIUM SERPL-MCNC: 1.9 MG/DL (ref 1.6–2.6)
MCH RBC QN AUTO: 32.6 PG (ref 27–31)
MCHC RBC AUTO-ENTMCNC: 32.4 G/DL (ref 32–36)
MCV RBC AUTO: 101 FL (ref 82–98)
MONOCYTES # BLD AUTO: 0.7 K/UL (ref 0.3–1)
MONOCYTES NFR BLD: 8.9 % (ref 4–15)
NEUTROPHILS # BLD AUTO: 5.8 K/UL (ref 1.8–7.7)
NEUTROPHILS NFR BLD: 72.4 % (ref 38–73)
NRBC BLD-RTO: 0 /100 WBC
PLATELET # BLD AUTO: 210 K/UL (ref 150–350)
PMV BLD AUTO: 11.2 FL (ref 9.2–12.9)
POTASSIUM SERPL-SCNC: 4 MMOL/L (ref 3.5–5.1)
PROT SERPL-MCNC: 7.4 G/DL (ref 6–8.4)
RBC # BLD AUTO: 3.59 M/UL (ref 4–5.4)
SODIUM SERPL-SCNC: 138 MMOL/L (ref 136–145)
WBC # BLD AUTO: 8.07 K/UL (ref 3.9–12.7)

## 2020-03-19 PROCEDURE — D9220A PRA ANESTHESIA: ICD-10-PCS | Mod: ,,, | Performed by: ANESTHESIOLOGY

## 2020-03-19 PROCEDURE — 63600175 PHARM REV CODE 636 W HCPCS: Performed by: NURSE PRACTITIONER

## 2020-03-19 PROCEDURE — 27201423 OPTIME MED/SURG SUP & DEVICES STERILE SUPPLY: Performed by: UROLOGY

## 2020-03-19 PROCEDURE — G0378 HOSPITAL OBSERVATION PER HR: HCPCS

## 2020-03-19 PROCEDURE — 25500020 PHARM REV CODE 255: Performed by: UROLOGY

## 2020-03-19 PROCEDURE — C2617 STENT, NON-COR, TEM W/O DEL: HCPCS | Performed by: UROLOGY

## 2020-03-19 PROCEDURE — 37000008 HC ANESTHESIA 1ST 15 MINUTES: Performed by: UROLOGY

## 2020-03-19 PROCEDURE — 00918 ANES TRURL PX URTRL CAL RMVL: CPT | Performed by: UROLOGY

## 2020-03-19 PROCEDURE — 85025 COMPLETE CBC W/AUTO DIFF WBC: CPT

## 2020-03-19 PROCEDURE — 25000003 PHARM REV CODE 250: Performed by: ANESTHESIOLOGY

## 2020-03-19 PROCEDURE — C1758 CATHETER, URETERAL: HCPCS | Performed by: UROLOGY

## 2020-03-19 PROCEDURE — 87086 URINE CULTURE/COLONY COUNT: CPT

## 2020-03-19 PROCEDURE — 99204 PR OFFICE/OUTPT VISIT, NEW, LEVL IV, 45-59 MIN: ICD-10-PCS | Mod: 25,,, | Performed by: UROLOGY

## 2020-03-19 PROCEDURE — C1769 GUIDE WIRE: HCPCS | Performed by: UROLOGY

## 2020-03-19 PROCEDURE — 83735 ASSAY OF MAGNESIUM: CPT

## 2020-03-19 PROCEDURE — 99900104 DSU ONLY-NO CHARGE-EA ADD'L HR (STAT): Performed by: UROLOGY

## 2020-03-19 PROCEDURE — 74420 UROGRAPHY RTRGR +-KUB: CPT | Mod: 26,,, | Performed by: UROLOGY

## 2020-03-19 PROCEDURE — 74420 PR  X-RAY RETROGRADE PYELOGRAM: ICD-10-PCS | Mod: 26,,, | Performed by: UROLOGY

## 2020-03-19 PROCEDURE — 52356 PR CYSTO/URETERO W/LITHOTRIPSY: ICD-10-PCS | Mod: RT,,, | Performed by: UROLOGY

## 2020-03-19 PROCEDURE — 99204 OFFICE O/P NEW MOD 45 MIN: CPT | Mod: 25,,, | Performed by: UROLOGY

## 2020-03-19 PROCEDURE — 71000033 HC RECOVERY, INTIAL HOUR: Performed by: UROLOGY

## 2020-03-19 PROCEDURE — 96361 HYDRATE IV INFUSION ADD-ON: CPT

## 2020-03-19 PROCEDURE — D9220A PRA ANESTHESIA: Mod: ,,, | Performed by: ANESTHESIOLOGY

## 2020-03-19 PROCEDURE — 52356 CYSTO/URETERO W/LITHOTRIPSY: CPT | Mod: RT,,, | Performed by: UROLOGY

## 2020-03-19 PROCEDURE — 96376 TX/PRO/DX INJ SAME DRUG ADON: CPT

## 2020-03-19 PROCEDURE — 63600175 PHARM REV CODE 636 W HCPCS: Performed by: NURSE ANESTHETIST, CERTIFIED REGISTERED

## 2020-03-19 PROCEDURE — 36000707: Performed by: UROLOGY

## 2020-03-19 PROCEDURE — 36415 COLL VENOUS BLD VENIPUNCTURE: CPT

## 2020-03-19 PROCEDURE — 82365 CALCULUS SPECTROSCOPY: CPT

## 2020-03-19 PROCEDURE — 99900103 DSU ONLY-NO CHARGE-INITIAL HR (STAT): Performed by: UROLOGY

## 2020-03-19 PROCEDURE — 36000706: Performed by: UROLOGY

## 2020-03-19 PROCEDURE — 37000009 HC ANESTHESIA EA ADD 15 MINS: Performed by: UROLOGY

## 2020-03-19 PROCEDURE — 80053 COMPREHEN METABOLIC PANEL: CPT

## 2020-03-19 DEVICE — STENT SET URETERAL 6X26CM: Type: IMPLANTABLE DEVICE | Site: URETER | Status: FUNCTIONAL

## 2020-03-19 RX ORDER — LANOLIN ALCOHOL/MO/W.PET/CERES
800 CREAM (GRAM) TOPICAL
Status: DISCONTINUED | OUTPATIENT
Start: 2020-03-19 | End: 2020-03-20 | Stop reason: HOSPADM

## 2020-03-19 RX ORDER — ACETAMINOPHEN 10 MG/ML
INJECTION, SOLUTION INTRAVENOUS
Status: DISCONTINUED | OUTPATIENT
Start: 2020-03-19 | End: 2020-03-19

## 2020-03-19 RX ORDER — LIDOCAINE HYDROCHLORIDE 10 MG/ML
1 INJECTION, SOLUTION EPIDURAL; INFILTRATION; INTRACAUDAL; PERINEURAL ONCE
Status: DISCONTINUED | OUTPATIENT
Start: 2020-03-19 | End: 2020-03-19 | Stop reason: HOSPADM

## 2020-03-19 RX ORDER — FENTANYL CITRATE 50 UG/ML
INJECTION, SOLUTION INTRAMUSCULAR; INTRAVENOUS
Status: DISCONTINUED | OUTPATIENT
Start: 2020-03-19 | End: 2020-03-19

## 2020-03-19 RX ORDER — PHENYLEPHRINE HYDROCHLORIDE 10 MG/ML
INJECTION INTRAVENOUS
Status: DISCONTINUED | OUTPATIENT
Start: 2020-03-19 | End: 2020-03-19

## 2020-03-19 RX ORDER — POTASSIUM CHLORIDE 20 MEQ/15ML
40 SOLUTION ORAL
Status: DISCONTINUED | OUTPATIENT
Start: 2020-03-19 | End: 2020-03-20 | Stop reason: HOSPADM

## 2020-03-19 RX ORDER — GLUCAGON 1 MG
1 KIT INJECTION
Status: DISCONTINUED | OUTPATIENT
Start: 2020-03-19 | End: 2020-03-20 | Stop reason: HOSPADM

## 2020-03-19 RX ORDER — OXYCODONE HYDROCHLORIDE 5 MG/1
5 TABLET ORAL
Status: DISCONTINUED | OUTPATIENT
Start: 2020-03-19 | End: 2020-03-19 | Stop reason: HOSPADM

## 2020-03-19 RX ORDER — SODIUM CHLORIDE 9 MG/ML
INJECTION, SOLUTION INTRAVENOUS CONTINUOUS
Status: DISCONTINUED | OUTPATIENT
Start: 2020-03-19 | End: 2020-03-20 | Stop reason: HOSPADM

## 2020-03-19 RX ORDER — FENTANYL CITRATE 50 UG/ML
25 INJECTION, SOLUTION INTRAMUSCULAR; INTRAVENOUS EVERY 5 MIN PRN
Status: DISCONTINUED | OUTPATIENT
Start: 2020-03-19 | End: 2020-03-19 | Stop reason: HOSPADM

## 2020-03-19 RX ORDER — DEXAMETHASONE SODIUM PHOSPHATE 4 MG/ML
INJECTION, SOLUTION INTRA-ARTICULAR; INTRALESIONAL; INTRAMUSCULAR; INTRAVENOUS; SOFT TISSUE
Status: DISCONTINUED | OUTPATIENT
Start: 2020-03-19 | End: 2020-03-19

## 2020-03-19 RX ORDER — ACETAMINOPHEN 325 MG/1
650 TABLET ORAL EVERY 4 HOURS PRN
Status: DISCONTINUED | OUTPATIENT
Start: 2020-03-19 | End: 2020-03-20 | Stop reason: HOSPADM

## 2020-03-19 RX ORDER — SODIUM CHLORIDE 0.9 % (FLUSH) 0.9 %
10 SYRINGE (ML) INJECTION
Status: DISCONTINUED | OUTPATIENT
Start: 2020-03-19 | End: 2020-03-20 | Stop reason: HOSPADM

## 2020-03-19 RX ORDER — IBUPROFEN 200 MG
16 TABLET ORAL
Status: DISCONTINUED | OUTPATIENT
Start: 2020-03-19 | End: 2020-03-20 | Stop reason: HOSPADM

## 2020-03-19 RX ORDER — LIDOCAINE HCL/PF 100 MG/5ML
SYRINGE (ML) INTRAVENOUS
Status: DISCONTINUED | OUTPATIENT
Start: 2020-03-19 | End: 2020-03-19

## 2020-03-19 RX ORDER — MIDAZOLAM HYDROCHLORIDE 1 MG/ML
INJECTION, SOLUTION INTRAMUSCULAR; INTRAVENOUS
Status: DISCONTINUED | OUTPATIENT
Start: 2020-03-19 | End: 2020-03-19

## 2020-03-19 RX ORDER — PROPOFOL 10 MG/ML
VIAL (ML) INTRAVENOUS
Status: DISCONTINUED | OUTPATIENT
Start: 2020-03-19 | End: 2020-03-19

## 2020-03-19 RX ORDER — SCOLOPAMINE TRANSDERMAL SYSTEM 1 MG/1
1 PATCH, EXTENDED RELEASE TRANSDERMAL ONCE
Status: DISCONTINUED | OUTPATIENT
Start: 2020-03-19 | End: 2020-03-20 | Stop reason: HOSPADM

## 2020-03-19 RX ORDER — MORPHINE SULFATE 2 MG/ML
4 INJECTION, SOLUTION INTRAMUSCULAR; INTRAVENOUS EVERY 4 HOURS PRN
Status: DISCONTINUED | OUTPATIENT
Start: 2020-03-19 | End: 2020-03-20 | Stop reason: HOSPADM

## 2020-03-19 RX ORDER — IBUPROFEN 200 MG
24 TABLET ORAL
Status: DISCONTINUED | OUTPATIENT
Start: 2020-03-19 | End: 2020-03-20 | Stop reason: HOSPADM

## 2020-03-19 RX ORDER — ONDANSETRON HYDROCHLORIDE 2 MG/ML
INJECTION, SOLUTION INTRAMUSCULAR; INTRAVENOUS
Status: DISCONTINUED | OUTPATIENT
Start: 2020-03-19 | End: 2020-03-19

## 2020-03-19 RX ORDER — ONDANSETRON 2 MG/ML
4 INJECTION INTRAMUSCULAR; INTRAVENOUS EVERY 6 HOURS PRN
Status: DISCONTINUED | OUTPATIENT
Start: 2020-03-19 | End: 2020-03-20 | Stop reason: HOSPADM

## 2020-03-19 RX ADMIN — FENTANYL CITRATE 50 MCG: 50 INJECTION, SOLUTION INTRAMUSCULAR; INTRAVENOUS at 12:03

## 2020-03-19 RX ADMIN — PHENYLEPHRINE HYDROCHLORIDE 100 MCG: 10 INJECTION INTRAVENOUS at 12:03

## 2020-03-19 RX ADMIN — MIDAZOLAM 2 MG: 1 INJECTION INTRAMUSCULAR; INTRAVENOUS at 12:03

## 2020-03-19 RX ADMIN — PHENYLEPHRINE HYDROCHLORIDE 100 MCG: 10 INJECTION INTRAVENOUS at 01:03

## 2020-03-19 RX ADMIN — CEFTRIAXONE 1 G: 1 INJECTION, SOLUTION INTRAVENOUS at 12:03

## 2020-03-19 RX ADMIN — ACETAMINOPHEN 1000 MG: 10 INJECTION, SOLUTION INTRAVENOUS at 12:03

## 2020-03-19 RX ADMIN — LIDOCAINE HYDROCHLORIDE 75 MG: 20 INJECTION, SOLUTION INTRAVENOUS at 12:03

## 2020-03-19 RX ADMIN — PROPOFOL 200 MG: 10 INJECTION, EMULSION INTRAVENOUS at 12:03

## 2020-03-19 RX ADMIN — SODIUM CHLORIDE: 0.9 INJECTION, SOLUTION INTRAVENOUS at 12:03

## 2020-03-19 RX ADMIN — DEXAMETHASONE SODIUM PHOSPHATE 4 MG: 4 INJECTION, SOLUTION INTRAMUSCULAR; INTRAVENOUS at 12:03

## 2020-03-19 RX ADMIN — SODIUM CHLORIDE: 0.9 INJECTION, SOLUTION INTRAVENOUS at 01:03

## 2020-03-19 RX ADMIN — SCOPALAMINE 1 PATCH: 1 PATCH, EXTENDED RELEASE TRANSDERMAL at 12:03

## 2020-03-19 RX ADMIN — ONDANSETRON 4 MG: 2 INJECTION, SOLUTION INTRAMUSCULAR; INTRAVENOUS at 12:03

## 2020-03-19 NOTE — OP NOTE
[facility]  Operative Note    Date: 03/19/2020    Pre-Op Diagnosis: Obstructing right ureteral stone    Post-Op Diagnosis: Same    Procedure(s) Performed:   1. Cystourethroscopy  2. Right ureteroscopy with laser lithotripsy and basket extraction of stone fragments  3. Right retrograde pyelogram  4. Right ureteral stent placement, 6 x 26 cm JJ stent     Flouro <1 hour    Specimen(s): Urine culture, stone for analysis    Staff Surgeon: Shelley Mai Jr, MD    Anesthesia: General    Indications: Eleno Kiran is a 34 y.o. female with nephrolithiasis.     Patient presented to the emergency department on 3/18/20 complaining of severe right flank pain for approximately 2 weeks associatd with nausea and emesis.      She reports being diagnosed with a urinary tract infection 2 weeks prior to arrival. She was treated with antibiotics and her symptoms improved.      CT abd/pelvis with contrast in the emergency department revealed an obstructing 5 mm right distal ureteral stone with severe hydroureteronephrosis. Urology consulted to assist with management.      She denies any fever, chills, gross hematuria or history of  malignancy.      Of note, her urine drug screen was positive for cocaine.     WBC  8     Creatinine  1.2     UA  Negative blood, nitrites and leukocytes     Urine culture  E. Coli              2/9/20    Findings:   5 mm right distal ureteral stone lasered and fragments removed. Right ureteral stent placed.     Estimated Blood Loss: Minimal    Drains: 6 x 26 cm JJ stent    Complications: None    Implants: 6 x 26 cm JJ stent    Procedure in detail:  After informed consent was obtained, the patient was brought the the cystoscopy suite and placed in the supine position.  SCDs were applied and working.  GETA was administered.  The patient was then placed in the dorsal lithotomy position and prepped and draped in the usual sterile fashion.      A rigid cystoscope in a 22 Fr sheath was  introduced into the patient's urethra.  This passed easily.  The entire urethra was visualized which showed no strictures or masses.  Formal cystoscopy was performed which revealed no masses or lesions suspicious for malignancy, no bladder stones, no bladder diverticuli, no trabeculations.  The UOs were visualized in the normal anatomic position bilaterally and clear efflux was visualized.      A motion wire was passed up the rightUO and up into the kidney.  This passed easily and placement was confirmed using fluoro.  The cystoscope was removed keeping the guidewire in place.     An 8 Fr rigid ureteroscope was passed into the patient's bladder alongside the wire under direct vision.  It was then passed through the right UO alongside the wire.  A stone was encountered in the right distal ureter. A 275 micron laser fiber was passed through the ureteroscope.  The stone was fragmented using the laser.  The laser fiber was removed and an NCircle basket was introduced through the ureteroscope.  Stone fragments were removed.    A retrograde was performed. The entire mid and distal ureter was examined for residual stones and removed if encountered. The ureteroscope was removed keeping the guide wire in place.      A cystoscope was reinserted and the bladder was irrigated to remove the stone fragments.  The bladder was drained the cystoscope removed keeping the wire in place.  The wire was backloaded through the cystoscope using a pusher.    A 6x26 cm JJ ureteral stent with strings was passed over the wire and up into the renal pelvis using fluoro.  When the coil appeared to be in good position in the kidney and the radio-opaque marker of the pusher was at the inferior pubis, the wire was removed under continuous fluoro.  Good coils were seen in the kidney and the bladder using fluoro.      The patient tolerated the procedure well and was transferred to the recovery room in stable condition.      Disposition: Back to floor  with hospital medicine. Patient can remove her stent at home on Monday, 3/23/20 (instructions in AVS). She will follow up in clinic in 4 weeks. Ok to discharge once stable with Abx, Flomax and Pyridium. Strict ER return precautions.       Shelley Mai Jr, MD

## 2020-03-19 NOTE — ANESTHESIA PREPROCEDURE EVALUATION
03/19/2020  Eleno Kiran is a 34 y.o., female.    Anesthesia Evaluation         Review of Systems  Anesthesia Hx:  Hx of Anesthetic complications PONV   Social:  Smoker + cocaine   Cardiovascular:   Exercise tolerance: good   Pulmonary:  Pulmonary Normal    Renal/:   renal calculi    Hepatic/GI:  Hepatic/GI Normal    Neurological:  Neurology Normal    Endocrine:  Endocrine Normal    Psych:   Psychiatric History          Physical Exam  General:  Well nourished    Airway/Jaw/Neck:  Airway Findings: Mouth Opening: Normal Tongue: Normal  General Airway Assessment: Adult  Mallampati: III  Improves to II with phonation.  TM Distance: Normal, at least 6 cm       Chest/Lungs:  Chest/Lungs Clear    Heart/Vascular:  Heart Findings: Normal            Anesthesia Plan  Type of Anesthesia, risks & benefits discussed:  Anesthesia Type:  general  Patient's Preference:   Intra-op Monitoring Plan: standard ASA monitors  Intra-op Monitoring Plan Comments:   Post Op Pain Control Plan: multimodal analgesia and IV/PO Opioids PRN  Post Op Pain Control Plan Comments: Opioids and analgesic adjuvants as needed  Regional blocks if applicable/indicated  Induction:   IV  Beta Blocker:  Patient is not currently on a Beta-Blocker (No further documentation required).       Informed Consent: Patient understands risks and agrees with Anesthesia plan.  Questions answered. Anesthesia consent signed with patient.  ASA Score: 2     Day of Surgery Review of History & Physical:    H&P update referred to the surgeon.     Anesthesia Plan Notes: I have personally evaluated the patient and discussed risk/benefits/alternatives of general anesthesia.        Ready For Surgery From Anesthesia Perspective.

## 2020-03-19 NOTE — SUBJECTIVE & OBJECTIVE
"History reviewed. No pertinent past medical history.    Past Surgical History:   Procedure Laterality Date     SECTION         Review of patient's allergies indicates:  No Known Allergies    No current facility-administered medications on file prior to encounter.      No current outpatient medications on file prior to encounter.     Family History     Family history is unknown by patient.        Tobacco Use    Smoking status: Former Smoker    Smokeless tobacco: Never Used   Substance and Sexual Activity    Alcohol use: Not Currently    Drug use: Yes     Types: Marijuana, Methamphetamines, Amphetamines, "Crack" cocaine    Sexual activity: Not Currently     Review of Systems   Constitutional: Positive for appetite change. Negative for activity change, chills, fatigue and fever.   HENT: Negative for congestion, sinus pressure, sinus pain and sore throat.    Eyes: Negative for photophobia and visual disturbance.   Respiratory: Negative for cough, choking, chest tightness, shortness of breath and wheezing.    Cardiovascular: Negative for chest pain, palpitations and leg swelling.   Gastrointestinal: Positive for abdominal pain, nausea and vomiting. Negative for abdominal distention, blood in stool, constipation and diarrhea.   Genitourinary: Positive for dysuria and flank pain. Negative for difficulty urinating and hematuria.   Musculoskeletal: Positive for back pain. Negative for gait problem and joint swelling.        R flank pain     Skin: Negative for rash and wound.   Neurological: Negative for dizziness, syncope, weakness, light-headedness, numbness and headaches.   Psychiatric/Behavioral: Negative for confusion. The patient is not nervous/anxious.      Objective:     Vital Signs (Most Recent):  Temp: 98.3 °F (36.8 °C) (20)  Pulse: 90 (20)  Resp: 20 (20 180)  BP: 110/68 (20)  SpO2: 100 % (20) Vital Signs (24h Range):  Temp:  [98.3 °F (36.8 °C)] 98.3 " °F (36.8 °C)  Pulse:  [71-97] 90  Resp:  [20] 20  SpO2:  [99 %-100 %] 100 %  BP: (110-138)/(63-82) 110/68     Weight: 90.7 kg (200 lb)  Body mass index is 32.28 kg/m².    Physical Exam   Constitutional: She is oriented to person, place, and time. She appears well-developed and well-nourished. No distress.   HENT:   Head: Normocephalic and atraumatic.   Eyes: Pupils are equal, round, and reactive to light. EOM are normal. Right eye exhibits no discharge. Left eye exhibits no discharge.   Neck: Normal range of motion. No JVD present.   Cardiovascular: Normal rate, regular rhythm, normal heart sounds and intact distal pulses.   No murmur heard.  Pulmonary/Chest: Effort normal and breath sounds normal. No respiratory distress. She has no wheezes. She has no rales. She exhibits no tenderness.   Patient on room air during my initial interview and physical exam, patient in no acute respiratory distress.   Abdominal: Soft. Bowel sounds are normal. She exhibits no distension. There is tenderness. There is no rebound and no guarding.   Suprapubic pain noted upon palpation   Genitourinary:   Genitourinary Comments: Exam Deferred   No CVA tenderness noted   Musculoskeletal: Normal range of motion. She exhibits no edema, tenderness or deformity.   Neurological: She is alert and oriented to person, place, and time. No cranial nerve deficit or sensory deficit.   Skin: Skin is warm and dry. Capillary refill takes 2 to 3 seconds. No rash noted. She is not diaphoretic. No erythema.   Psychiatric: She has a normal mood and affect. Her behavior is normal. Judgment and thought content normal.   Nursing note and vitals reviewed.        CRANIAL NERVES     CN III, IV, VI   Pupils are equal, round, and reactive to light.  Extraocular motions are normal.        Significant Labs:   CBC:   Recent Labs   Lab 03/18/20  1827   WBC 7.46   HGB 12.8   HCT 39.5        CMP:   Recent Labs   Lab 03/18/20  1827      K 3.6      CO2 19*       BUN 9   CREATININE 1.0   CALCIUM 9.8   ANIONGAP 14   EGFRNONAA >60     Urine Studies:   Recent Labs   Lab 03/18/20  1913   COLORU Yellow   APPEARANCEUA Clear   PHUR 6.0   SPECGRAV 1.025   PROTEINUA Trace*   GLUCUA Negative   KETONESU 3+*   BILIRUBINUA 1+*   OCCULTUA Negative   NITRITE Negative   UROBILINOGEN Negative   LEUKOCYTESUR Negative     UDS - presumptive positive for Cocaine     All pertinent labs within the past 24 hours have been reviewed.    Significant Imaging: I have reviewed all pertinent imaging results/findings within the past 24 hours.     CT Abdomen/Pelvis With Contrast:  Impression       Right-sided nephrolithiasis with a partially obstructing 5 mm stone in the right distal ureter with marked right-sided hydroureteronephrosis.  Component of superinfection in the right collecting system may be present.  Follow-up with dedicated CT urogram is suggested.    Wall thickening of the small and large bowel loops.  The findings may relate to enterocolitis.  Suggest clinical correlation.    Changes of prior cholecystectomy with persistent intrahepatic and extrahepatic biliary dilatation.    Additional findings as above.

## 2020-03-19 NOTE — HPI
"Eleno Kiran is a 34-year-old female with no past medical history who presents to the emergency room tonight with dysuria and suprapubic pain.  Patient states that 2 weeks ago she was seen in the emergency room for similar symptoms and was diagnosed with a UTI and discharged home with antibiotics.  Patient states she took 2 of the antibiotic pills, states she lost the rest of them.  Patient states pain is worse today and is now accompanied with vomiting.  Patient states pain is located to the right flank area, sharp in characteristic, worse with movement.  Patient endorses 6 episodes of vomiting today. Denies fever, chest pain, shortness of breath, diarrhea, or history of kidney stones.  Endorses decreased p.o. intake.  In the emergency room CT of the abdomen pelvis was performed revealing "Right-sided nephrolithiasis with a partially obstructing 5 mm stone in the right distal ureter with marked right-sided hydroureteronephrosis.  Component of superinfection in the right collecting system may be present."  Urology, Dr. Jay was contacted per ER physician and made aware of patient case.  Patient placed in observation on 03/18/2020 at approximately 9:00 p.m..  Patient not accompanied by any visitors during my initial physical exam.  "

## 2020-03-19 NOTE — PLAN OF CARE
"Pt resting with eyes closed. When asked abut pain, Pt shakes her head "no" throughout shift. Medication given per MAR. Quiet environment and relaxation facilitated. Bed in lowest position with wheels locked, and side rails up x2. Safety maintained. Plan of care reviewed and pt verbalized understanding. Call light in reach. No further requests at this time. Will continue to monitor.    "

## 2020-03-19 NOTE — ED PROVIDER NOTES
Encounter Date: 3/18/2020    SCRIBE #1 NOTE: I, Vero Box, am scribing for, and in the presence of, Finn Hinton MD.       History     Chief Complaint   Patient presents with    Flank Pain     recent UTI / lost antibiotics        Time seen by provider: 7:07 PM on 2020    Eleno Kiran is a 34 y.o. female who presents to the ED with an onset of right sided flank pain. She states that she was diagnosed with a UTI x 2 weeks PTA. She voices that she was taking antibiotics for 2 days and symptoms improved but she has since lost her medications. She endorses that flank pain along with nausea and vomiting began today. She denies any alcohol or illicit drug use, diarrhea, abdominal pain, or blood in stool or urine. No prior PMHx. PSHx includes  section.    The history is provided by the patient.     Review of patient's allergies indicates:  No Known Allergies  No past medical history on file.  Past Surgical History:   Procedure Laterality Date     SECTION       Family History   Family history unknown: Yes     Social History     Tobacco Use    Smoking status: Former Smoker    Smokeless tobacco: Never Used   Substance Use Topics    Alcohol use: Not Currently    Drug use: Yes     Types: Marijuana, Methamphetamines, Amphetamines     Review of Systems   Constitutional: Negative for activity change, diaphoresis and fever.   HENT: Negative for ear pain, rhinorrhea, sore throat and trouble swallowing.    Eyes: Negative for pain and visual disturbance.   Respiratory: Negative for cough, shortness of breath and stridor.    Cardiovascular: Negative for chest pain.   Gastrointestinal: Positive for nausea and vomiting. Negative for abdominal pain, blood in stool and diarrhea.   Genitourinary: Positive for decreased urine volume, dysuria and flank pain. Negative for hematuria, vaginal bleeding and vaginal discharge.   Musculoskeletal: Negative for gait problem.   Skin: Negative for rash and  wound.   Neurological: Negative for seizures and headaches.   Psychiatric/Behavioral: Negative for hallucinations and suicidal ideas.       Physical Exam     Initial Vitals [03/18/20 1805]   BP Pulse Resp Temp SpO2   127/63 97 20 98.3 °F (36.8 °C) 100 %      MAP       --         Physical Exam    Nursing note and vitals reviewed.  Constitutional: Vital signs are normal. She appears well-developed and well-nourished. She is not diaphoretic. She appears ill. No distress.   Uncomfortable appearing.   HENT:   Head: Normocephalic and atraumatic.   Nose: Nose normal.   Eyes: Conjunctivae and EOM are normal. Pupils are equal, round, and reactive to light. No scleral icterus.   Neck: Normal range of motion. Neck supple.   Cardiovascular: Normal rate, regular rhythm, normal heart sounds and intact distal pulses. Exam reveals no gallop and no friction rub.    No murmur heard.  Pulmonary/Chest: Effort normal and breath sounds normal. No stridor. No respiratory distress. She has no wheezes. She has no rhonchi. She has no rales.   Abdominal: Soft. Normal appearance and bowel sounds are normal. She exhibits no distension. There is no tenderness. There is no rigidity, no rebound, no guarding and no CVA tenderness.   No CVA tenderness or abdominal tenderness to palpation on exam.    Musculoskeletal: Normal range of motion.   No flank pain noted.   Neurological: She is alert and oriented to person, place, and time. No cranial nerve deficit.   Skin: Skin is warm and dry. Capillary refill takes less than 2 seconds. No rash noted.   Psychiatric: She has a normal mood and affect.         ED Course   Procedures  Labs Reviewed   CBC W/ AUTO DIFFERENTIAL - Abnormal; Notable for the following components:       Result Value    RBC 3.92 (*)     Mean Corpuscular Volume 101 (*)     Mean Corpuscular Hemoglobin 32.7 (*)     All other components within normal limits   BASIC METABOLIC PANEL - Abnormal; Notable for the following components:    CO2 19  (*)     All other components within normal limits   URINALYSIS, REFLEX TO URINE CULTURE - Abnormal; Notable for the following components:    Protein, UA Trace (*)     Ketones, UA 3+ (*)     Bilirubin (UA) 1+ (*)     All other components within normal limits    Narrative:     Preferred Collection Type->Urine, Clean Catch   DRUG SCREEN PANEL, URINE EMERGENCY    Narrative:     Preferred Collection Type->Urine, Clean Catch   POCT URINE PREGNANCY          Imaging Results          CT Abdomen Pelvis With Contrast (Final result)  Result time 03/18/20 21:05:34    Final result by Lew Day MD (03/18/20 21:05:34)                 Impression:      Right-sided nephrolithiasis with a partially obstructing 5 mm stone in the right distal ureter with marked right-sided hydroureteronephrosis.  Component of superinfection in the right collecting system may be present.  Follow-up with dedicated CT urogram is suggested.    Wall thickening of the small and large bowel loops.  The findings may relate to enterocolitis.  Suggest clinical correlation.    Changes of prior cholecystectomy with persistent intrahepatic and extrahepatic biliary dilatation.    Additional findings as above.      Electronically signed by: Lew Day MD  Date:    03/18/2020  Time:    21:05             Narrative:    EXAMINATION:  CT ABDOMEN PELVIS WITH CONTRAST    CLINICAL HISTORY:  Infection, abdomen-pelvis;    TECHNIQUE:  Low dose axial images, sagittal and coronal reformations were obtained from the lung bases to the pubic symphysis following the IV administration of 100 mL of Omnipaque 350 .  Oral contrast was not given.    COMPARISON:  CT abdomen pelvis dated 10/17/2019.    FINDINGS:  There are no pleural effusions.  There is no evidence of a pneumothorax.  There is no evidence of pneumomediastinum.  No airspace opacity is present.    The heart is unremarkable.  There is normal tapering of the abdominal aorta.  The aortic branch vessels are within normal  limits.  The celiac trunk, SMA, and MICHAEL are within normal limits.  The portal veins and mesenteric vessels are within normal limits.  The IVC and the remainder of the venous structures are within normal limits.    The esophagus, stomach, and duodenum are within normal limits.  There is minimal fluid prominence of the small bowel loops.  No transition point is identified.  The appendix is within normal limits.  There is also some mild wall thickening of the large bowel.    The liver is unremarkable.  The patient is status post cholecystectomy.  There is unchanged appearance of mild intrahepatic extrahepatic biliary dilatation.  The spleen is unremarkable.  The pancreas is within normal limits.    The adrenal glands are unremarkable.  There is a 3 mm nonobstructing stone in the upper pole of the right kidney.  No stones are identified in the left kidney.  There is marked right-sided hydroureteronephrosis.  There is a 5 mm obstructing stone in the right distal ureter.  There is also some component of the distension of the ureter downstream to the 5 mm stone.  The left kidney and left ureter are within normal limits.  There is circumferential wall thickening of the urinary bladder.  The uterus and adnexal structures are within normal limits.    There is no evidence of free fluid in the abdomen or pelvis.  There is no evidence of free air.  There is no evidence of pneumatosis.  No portal venous air is identified.    The psoas margins are unremarkable.  The abdominal wall is within normal limits.  The osseous structures are unremarkable.                                 Medical Decision Making:   History:   Old Medical Records: I decided to obtain old medical records.  Clinical Tests:   Lab Tests: Ordered and Reviewed  Radiological Study: Ordered and Reviewed            Scribe Attestation:   Scribe #1: I performed the above scribed service and the documentation accurately describes the services I performed. I attest to the  accuracy of the note.      Attending Attestation:     Physician Attestation for Scribe:    I, Dr. Finn Hinton, personally performed the services described in this documentation.   All medical record entries made by the scribe were at my direction and in my presence.   I have reviewed the chart and agree that the record is accurate and complete.   Finn Hinton MD  10:43 PM 03/18/2020     DISCLAIMER: This note was prepared with [employer] voice recognition transcription software. Garbled syntax, mangled pronouns, and other bizarre constructions may be attributed to that software system.          ED Course as of Mar 18 2243   Wed Mar 18, 2020   2109 Impression      Right-sided nephrolithiasis with a partially obstructing 5 mm stone in the right distal ureter with marked right-sided hydroureteronephrosis.  Component of superinfection in the right collecting system may be present.  Follow-up with dedicated CT urogram is suggested.    Wall thickening of the small and large bowel loops.  The findings may relate to enterocolitis.  Suggest clinical correlation.    Changes of prior cholecystectomy with persistent intrahepatic and extrahepatic biliary dilatation.    Additional findings as above.      Electronically signed by: eLw Day MD  Date: 03/18/2020  Time: 21:05        [BD]   2114 34-year-old female with recent past medical history of pyelonephritis which she reports lost her antibiotics presents today with right flank pain consistent with kidney stone.  CT shows right-sided nephrolithiasis with 5 mm partially obstructing stone in the right distal ureter with marked right-sided hydroureteronephrosis as well as possible super infection right collecting system.  Given patient's pain, nausea and partially treated infection with obstructing stone and marked hydroureteronephrosis will admit with IV antibiotics for further workup and management.    [BD]      ED Course User Index  [BD] Finn Hinton  MD        Clinical Impression:       ICD-10-CM ICD-9-CM   1. Ureterolithiasis N20.1 592.1   2. Hydroureteronephrosis N13.30 591   3. Pyelonephritis N12 590.80         Disposition:   Disposition: Discharged  Condition: Stable     ED Disposition Condition    Observation                           Finn Hinton MD  03/18/20 1279

## 2020-03-19 NOTE — TRANSFER OF CARE
"Anesthesia Transfer of Care Note    Patient: Eleno Kiran    Procedure(s) Performed: Procedure(s) (LRB):  REMOVAL, CALCULUS, URETER, URETEROSCOPIC (Right)  CYSTOSCOPY, WITH URETERAL STENT INSERTION (Right)    Patient location: PACU    Anesthesia Type: general    Transport from OR: Transported from OR on room air with adequate spontaneous ventilation    Post pain: adequate analgesia    Post assessment: no apparent anesthetic complications and tolerated procedure well    Post vital signs: stable    Level of consciousness: awake, alert and oriented    Nausea/Vomiting: no nausea/vomiting    Complications: none    Transfer of care protocol was followed      Last vitals:   Visit Vitals  /77 (BP Location: Left arm, Patient Position: Sitting)   Pulse 60   Temp 36.7 °C (98.1 °F) (Temporal)   Resp 16   Ht 5' 6" (1.676 m)   Wt 90.3 kg (199 lb)   LMP 02/26/2020 (Approximate)   SpO2 100%   Breastfeeding? No   BMI 32.12 kg/m²     "

## 2020-03-19 NOTE — DISCHARGE INSTRUCTIONS
VERY IMPORTANT - PLEASE READ    Your urologist is Dr. Shelley Mai  Office number: [phone] ([facility])  Address: [address] [place] [address],  (2nd office building on left); [address] (located on 2nd floor).     What Dr. Mai did today: Cysto, right ureteral stent placement, right ureteroscopy with laser lithotripsy     If you do not hear from us please call clinic to make a post-op appointment.   The following are specific instructions for you, so please read:    The ureter is the tube that drains the kidney (where urine is made). It connects the kidney to the bladder (where urine is stored).     A ureteral stent is a is a soft plastic tube with holes in tube that bypasses anything that obstructs (stone, tumor, scar tissue) the urine from flow from the kidney to the bladder. It is placed by going through the urethra and into the bladder. No incisions are made. Its temporarily inserted into a ureter to help drain urine into the bladder. One end goes in the kidney. The other end goes in the bladder. A coil on each end holds the stent in place. The stent cant be seen from outside the body.          You have a ureteral stent in your right kidney that was placed on 03/19/2020  -the stent can only remain for a maximum of 3 to 6 months. If the stent remains longer than this you can get recurrent urinary tract infections, the stent can have more stones form along it and urine will not be able to drain and you will lose all function of your kidney requiring a major surgery and a big incision to remove the kidney.       You have a string attached to the stent:  -you can remove the stent __3/23/20_______ at 6am  -it will be attached to the outside of your vagina (if you are a female) or to the penis (if you are male)  -if the stent is partially or accidentally pulled out you will leak urine until the stent is removed because urine is now coming directly from your kidney and bypassing the urethra. Go ahead and remove  it if this happens but expect pain for 24 to 48 hours or more after stent removed, especially if it was removed earlier than expected.  -If you have not heard from anyone about when to pull the string and remove the stent, remove it at two weeks after your surgery by pulling the string. DO NOT cut the string.   -When you remove the string, you should see a small plastic tube about the size of a cocktail straw and about 12 inches long attached to a string that looks like dental floss. If you do not see this, please let call the office and let us know.       If you do not receive a follow-up date or further instructions on what is to be done next about the stent, it is your responsibility to make sure that you have follow-up to have your stone or stent removed.      A stent CANNOT REMAIN indefinitely in the kidney. It should not remain if possible more than 3 to 6 months maximum (rarely 1 year if it was placed for cancer).      If you do not follow-up to have your stent removed then you can LOSE YOUR KIDNEY FUNCTION ON THAT SIDE, get RECURRENT URINARY TRACT INFECTIONS,and MORE STONES requiring SURGICAL OPEN removal of your kidney.     You can call our office ([facility] at [phone] and let them know a stent was placed and you need further instructions for follow-up). If there are issues with insurance we will work with you to help you arrange follow-up where it can be removed. Again,  A STENT CANNOT remain indefinitely.        A ureteral stent is left in place for many reasons:  -to keep the ureter open after a procedure as there will be much inflammation and if no stent is left you will experience the same pain as having the stone that caused the obstruction.   -to drain the kidney of infection.  -if the stone is up high, stuck, or you have an infection, the stent will stretch open the small ureter (the tube that drains the kidney) for a few weeks (usually 2 to 4 weeks) so that we can return  and place instruments into this tube to break up and remove the stone.      Ureteral Stent Symptoms can include but are not limited to   - pain in the kidney or bladder with urination during or especially at the end of voiding.   - constant urge to urinate, frequency of urination, severe bladder spasms and severe pain the kidney, especially during urination.  - blood in the urine, especially with increased activity. This can last the entire time the stent is in, it can sometimes clear and return or you may never have any at all. I recommend increasing fluid intake to prevent large clots that may be difficult to urinate out.      When to go to ER:   -fever >101.5 or inability to urinate (no urination for >4 hours and bladder pain).   -If you go to the ER with pain, they may check to make sure the stent is in good position with an x-ray or ultrasound but unlikely to do anything else.   -I will let you know when we will plan to have the stent either removed at the ambulatory surgical center as an outpatient procedure while you are awake, which is uncomfortable briefly, but not painful or you will remove it yourself by pulling the strings.

## 2020-03-19 NOTE — CONSULTS
"[facility] Urology Consult Patient/H&P:    Eleno Kiran is a 34 y.o. female who presents for nephrolithiasis.    Patient presented to the emergency department on 3/18/20 complaining of severe right flank pain for approximately 2 weeks associatd with nausea and emesis.     She reports being diagnosed with a urinary tract infection 2 weeks prior to arrival. She was treated with antibiotics and her symptoms improved.     CT abd/pelvis with contrast in the emergency department revealed an obstructing 5 mm right distal ureteral stone with severe hydroureteronephrosis. Urology consulted to assist with management.     She denies any fever, chills, gross hematuria or history of  malignancy.     Of note, her urine drug screen was positive for cocaine.     WBC  8    Creatinine  1.2    UA  Negative blood, nitrites and leukocytes    Urine culture  E. Coli  20       History reviewed. No pertinent past medical history.    Past Surgical History:   Procedure Laterality Date    ABDOMINAL SURGERY       SECTION         Family History   Problem Relation Age of Onset    Cancer Maternal Aunt        Social History     Socioeconomic History    Marital status: Single     Spouse name: Not on file    Number of children: Not on file    Years of education: Not on file    Highest education level: Not on file   Occupational History    Not on file   Social Needs    Financial resource strain: Not on file    Food insecurity:     Worry: Not on file     Inability: Not on file    Transportation needs:     Medical: Not on file     Non-medical: Not on file   Tobacco Use    Smoking status: Former Smoker    Smokeless tobacco: Never Used   Substance and Sexual Activity    Alcohol use: Not Currently    Drug use: Yes     Types: Marijuana, Methamphetamines, Amphetamines, "Crack" cocaine    Sexual activity: Not Currently   Lifestyle    Physical activity:     Days per week: Not on file     Minutes per " session: Not on file    Stress: Not on file   Relationships    Social connections:     Talks on phone: Not on file     Gets together: Not on file     Attends Sikhism service: Not on file     Active member of club or organization: Not on file     Attends meetings of clubs or organizations: Not on file     Relationship status: Not on file   Other Topics Concern    Not on file   Social History Narrative    Not on file       Review of patient's allergies indicates:  No Known Allergies    Medications Reviewed: see MAR    ROS:    Constitutional: denies fevers, chills, night sweats, fatigue, malaise  Respiratory: negative for cough, shortness of breath, wheezing, dyspnea.  Cardiovascular: - for high blood pressure, negative for chest pain, varicose veins, ankle swelling, palpitations, syncope.  GI: + nausea, emesis  Urology: as noted above in HPI  Endocrinology: negative for cold intolerance, excessive thirst, not feeling tired/sluggish, no heat intolerance.   Hematology/Lymph: negative for easy bleeding, easy bruising, swollen glands.  Musculoskeletal: negative for back pain, joint pain, joint swelling, neck pain.  Allergy-Immunology: negative for seasonal allergies, negative for unusual infections.   Skin: negative for boils, breast lumps, hives, itching, rash.   Neurology: negative for, dizziness, headache, tingling/numbness, tremors.   Psych: satisfied with life; negative for, anxiety, depression, suicidal thoughts.     PHYSICAL EXAM:    Vitals:    03/19/20 0819   BP: 123/66   Pulse: 78   Resp: 16   Temp: 98.6 °F (37 °C)     Body mass index is 32.15 kg/m².       General: Alert, cooperative, no distress, appears stated age  Head: Normocephalic, without obvious abnormality, atraumatic  Neck: no masses, no thyromegaly, no lymphadenopathy  Eyes: PERRL, conjunctiva/corneas clear  Lungs: Respirations unlabored, normal effort, no accessory muscle use  CV: Warm and well perfused extremities  Abdomen: Soft, non-tender, no  CVA tenderness, no hepatosplenomegaly, no hernia  Extremities: Extremities normal, atraumatic, no cyanosis or edema  Skin: Normal color, texture, and turgor, no rashes or lesions  Psych: Appropriate, well oriented, normal affect, normal mood  Neuro: Non-focal      LABS:    Recent Results (from the past 336 hour(s))   POCT urine pregnancy    Collection Time: 03/18/20  6:23 PM   Result Value Ref Range    POC Preg Test, Ur Negative Negative     Acceptable Yes    CBC auto differential    Collection Time: 03/18/20  6:27 PM   Result Value Ref Range    WBC 7.46 3.90 - 12.70 K/uL    RBC 3.92 (L) 4.00 - 5.40 M/uL    Hemoglobin 12.8 12.0 - 16.0 g/dL    Hematocrit 39.5 37.0 - 48.5 %    Mean Corpuscular Volume 101 (H) 82 - 98 fL    Mean Corpuscular Hemoglobin 32.7 (H) 27.0 - 31.0 pg    Mean Corpuscular Hemoglobin Conc 32.4 32.0 - 36.0 g/dL    RDW 13.3 11.5 - 14.5 %    Platelets 244 150 - 350 K/uL    MPV 10.4 9.2 - 12.9 fL    Immature Granulocytes 0.3 0.0 - 0.5 %    Gran # (ANC) 5.3 1.8 - 7.7 K/uL    Immature Grans (Abs) 0.02 0.00 - 0.04 K/uL    Lymph # 1.5 1.0 - 4.8 K/uL    Mono # 0.5 0.3 - 1.0 K/uL    Eos # 0.0 0.0 - 0.5 K/uL    Baso # 0.02 0.00 - 0.20 K/uL    nRBC 0 0 /100 WBC    Gran% 71.4 38.0 - 73.0 %    Lymph% 20.5 18.0 - 48.0 %    Mono% 7.1 4.0 - 15.0 %    Eosinophil% 0.4 0.0 - 8.0 %    Basophil% 0.3 0.0 - 1.9 %    Differential Method Automated    Basic metabolic panel    Collection Time: 03/18/20  6:27 PM   Result Value Ref Range    Sodium 141 136 - 145 mmol/L    Potassium 3.6 3.5 - 5.1 mmol/L    Chloride 108 95 - 110 mmol/L    CO2 19 (L) 23 - 29 mmol/L    Glucose 106 70 - 110 mg/dL    BUN, Bld 9 6 - 20 mg/dL    Creatinine 1.0 0.5 - 1.4 mg/dL    Calcium 9.8 8.7 - 10.5 mg/dL    Anion Gap 14 8 - 16 mmol/L    eGFR if African American >60 >60 mL/min/1.73 m^2    eGFR if non African American >60 >60 mL/min/1.73 m^2   Urinalysis, Reflex to Urine Culture Urine, Clean Catch    Collection Time: 03/18/20  7:13 PM    Result Value Ref Range    Specimen UA Urine, Clean Catch     Color, UA Yellow Yellow, Straw, Birgit    Appearance, UA Clear Clear    pH, UA 6.0 5.0 - 8.0    Specific Gravity, UA 1.025 1.005 - 1.030    Protein, UA Trace (A) Negative    Glucose, UA Negative Negative    Ketones, UA 3+ (A) Negative    Bilirubin (UA) 1+ (A) Negative    Occult Blood UA Negative Negative    Nitrite, UA Negative Negative    Urobilinogen, UA Negative <2.0 EU/dL    Leukocytes, UA Negative Negative   Drug screen panel, emergency    Collection Time: 03/18/20  7:13 PM   Result Value Ref Range    Benzodiazepines Negative     Methadone metabolites Negative     Cocaine (Metab.) Presumptive Positive     Opiate Scrn, Ur Negative     Barbiturate Screen, Ur Negative     Amphetamine Screen, Ur Negative     THC Negative     Phencyclidine Negative     Creatinine, Random Ur 269.2 15.0 - 325.0 mg/dL    Toxicology Information SEE COMMENT    Comprehensive Metabolic Panel (CMP)    Collection Time: 03/19/20  4:45 AM   Result Value Ref Range    Sodium 138 136 - 145 mmol/L    Potassium 4.0 3.5 - 5.1 mmol/L    Chloride 110 95 - 110 mmol/L    CO2 16 (L) 23 - 29 mmol/L    Glucose 94 70 - 110 mg/dL    BUN, Bld 8 6 - 20 mg/dL    Creatinine 1.2 0.5 - 1.4 mg/dL    Calcium 9.0 8.7 - 10.5 mg/dL    Total Protein 7.4 6.0 - 8.4 g/dL    Albumin 3.7 3.5 - 5.2 g/dL    Total Bilirubin 0.4 0.1 - 1.0 mg/dL    Alkaline Phosphatase 51 (L) 55 - 135 U/L    AST 17 10 - 40 U/L    ALT 8 (L) 10 - 44 U/L    Anion Gap 12 8 - 16 mmol/L    eGFR if African American >60 >60 mL/min/1.73 m^2    eGFR if non African American 58 (A) >60 mL/min/1.73 m^2   Magnesium    Collection Time: 03/19/20  4:45 AM   Result Value Ref Range    Magnesium 1.9 1.6 - 2.6 mg/dL   CBC with Automated Differential    Collection Time: 03/19/20  4:45 AM   Result Value Ref Range    WBC 8.07 3.90 - 12.70 K/uL    RBC 3.59 (L) 4.00 - 5.40 M/uL    Hemoglobin 11.7 (L) 12.0 - 16.0 g/dL    Hematocrit 36.1 (L) 37.0 - 48.5 %    Mean  Corpuscular Volume 101 (H) 82 - 98 fL    Mean Corpuscular Hemoglobin 32.6 (H) 27.0 - 31.0 pg    Mean Corpuscular Hemoglobin Conc 32.4 32.0 - 36.0 g/dL    RDW 13.7 11.5 - 14.5 %    Platelets 210 150 - 350 K/uL    MPV 11.2 9.2 - 12.9 fL    Immature Granulocytes 0.2 0.0 - 0.5 %    Gran # (ANC) 5.8 1.8 - 7.7 K/uL    Immature Grans (Abs) 0.02 0.00 - 0.04 K/uL    Lymph # 1.5 1.0 - 4.8 K/uL    Mono # 0.7 0.3 - 1.0 K/uL    Eos # 0.0 0.0 - 0.5 K/uL    Baso # 0.01 0.00 - 0.20 K/uL    nRBC 0 0 /100 WBC    Gran% 72.4 38.0 - 73.0 %    Lymph% 18.3 18.0 - 48.0 %    Mono% 8.9 4.0 - 15.0 %    Eosinophil% 0.1 0.0 - 8.0 %    Basophil% 0.1 0.0 - 1.9 %    Differential Method Automated        IMAGING:    CT abd/pelvis with contrast  3/18/20    Right-sided nephrolithiasis with a partially obstructing 5 mm stone in the right distal ureter with marked right-sided hydroureteronephrosis.  Component of superinfection in the right collecting system may be present.  Follow-up with dedicated CT urogram is suggested.    Wall thickening of the small and large bowel loops.  The findings may relate to enterocolitis.  Suggest clinical correlation.    Changes of prior cholecystectomy with persistent intrahepatic and extrahepatic biliary dilatation.    Additional findings as above.      Assessment/Diagnosis:    1. Obstructing right ureteral stone  2. Severe right hydroureteronephrosis  3. Right flank pain    Plans:    - Discussed the etiology and management of the patient's nephrolithiasis. Explained that stone disease is multifactorial and can be secondary to urinary obstruction, urine composition, low urine volume, diet, hypokalemia, DM, hypertension, gout, RTA, UTI and medications. We discussed that stones can be managed with observation, trial of passage, ureteroscopy with laser lithotripsy, ESWL and PCNL. After extensive discussion, patient has decided to proceed with attempted right ureteroscopy with laser lithotripsy, possible right ureteral  stent placement, right retrograde pyelogram. All risks, benefits and alternatives discussed at length with patient.

## 2020-03-19 NOTE — H&P
"[facility] Hospital Medicine  History & Physical    Patient Name: Eleno Kiran  MRN: [id number]  Admission Date: 3/18/2020  Attending Physician: Gabbie Motta MD   Primary Care Provider: Gerhard Sequeira III, MD         Patient information was obtained from patient, past medical records and ER records.     Subjective:     Principal Problem:Ureterolithiasis    Chief Complaint:   Chief Complaint   Patient presents with    Flank Pain     recent UTI / lost antibiotics         HPI: Eleno Kiran is a 34-year-old female with no past medical history who presents to the emergency room tonight with dysuria and suprapubic pain.  Patient states that 2 weeks ago she was seen in the emergency room for similar symptoms and was diagnosed with a UTI and discharged home with antibiotics.  Patient states she took 2 of the antibiotic pills, states she lost the rest of them.  Patient states pain is worse today and is now accompanied with vomiting.  Patient states pain is located to the right flank area, sharp in characteristic, worse with movement.  Patient endorses 6 episodes of vomiting today. Denies fever, chest pain, shortness of breath, diarrhea, or history of kidney stones.  Endorses decreased p.o. intake.  In the emergency room CT of the abdomen pelvis was performed revealing "Right-sided nephrolithiasis with a partially obstructing 5 mm stone in the right distal ureter with marked right-sided hydroureteronephrosis.  Component of superinfection in the right collecting system may be present."  Urology, Dr. Jay was contacted per ER physician and made aware of patient case.  Patient placed in observation on 2020 at approximately 9:00 p.m..  Patient not accompanied by any visitors during my initial physical exam.    History reviewed. No pertinent past medical history.    Past Surgical History:   Procedure Laterality Date     SECTION         Review of patient's " "allergies indicates:  No Known Allergies    No current facility-administered medications on file prior to encounter.      No current outpatient medications on file prior to encounter.     Family History     Family history is unknown by patient.        Tobacco Use    Smoking status: Former Smoker    Smokeless tobacco: Never Used   Substance and Sexual Activity    Alcohol use: Not Currently    Drug use: Yes     Types: Marijuana, Methamphetamines, Amphetamines, "Crack" cocaine    Sexual activity: Not Currently     Review of Systems   Constitutional: Positive for appetite change. Negative for activity change, chills, fatigue and fever.   HENT: Negative for congestion, sinus pressure, sinus pain and sore throat.    Eyes: Negative for photophobia and visual disturbance.   Respiratory: Negative for cough, choking, chest tightness, shortness of breath and wheezing.    Cardiovascular: Negative for chest pain, palpitations and leg swelling.   Gastrointestinal: Positive for abdominal pain, nausea and vomiting. Negative for abdominal distention, blood in stool, constipation and diarrhea.   Genitourinary: Positive for dysuria and flank pain. Negative for difficulty urinating and hematuria.   Musculoskeletal: Positive for back pain. Negative for gait problem and joint swelling.        R flank pain     Skin: Negative for rash and wound.   Neurological: Negative for dizziness, syncope, weakness, light-headedness, numbness and headaches.   Psychiatric/Behavioral: Negative for confusion. The patient is not nervous/anxious.      Objective:     Vital Signs (Most Recent):  Temp: 98.3 °F (36.8 °C) (03/18/20 1805)  Pulse: 90 (03/18/20 2139)  Resp: 20 (03/18/20 1805)  BP: 110/68 (03/18/20 2131)  SpO2: 100 % (03/18/20 2139) Vital Signs (24h Range):  Temp:  [98.3 °F (36.8 °C)] 98.3 °F (36.8 °C)  Pulse:  [71-97] 90  Resp:  [20] 20  SpO2:  [99 %-100 %] 100 %  BP: (110-138)/(63-82) 110/68     Weight: 90.7 kg (200 lb)  Body mass index is " 32.28 kg/m².    Physical Exam   Constitutional: She is oriented to person, place, and time. She appears well-developed and well-nourished. No distress.   HENT:   Head: Normocephalic and atraumatic.   Eyes: Pupils are equal, round, and reactive to light. EOM are normal. Right eye exhibits no discharge. Left eye exhibits no discharge.   Neck: Normal range of motion. No JVD present.   Cardiovascular: Normal rate, regular rhythm, normal heart sounds and intact distal pulses.   No murmur heard.  Pulmonary/Chest: Effort normal and breath sounds normal. No respiratory distress. She has no wheezes. She has no rales. She exhibits no tenderness.   Patient on room air during my initial interview and physical exam, patient in no acute respiratory distress.   Abdominal: Soft. Bowel sounds are normal. She exhibits no distension. There is tenderness. There is no rebound and no guarding.   Suprapubic pain noted upon palpation   Genitourinary:   Genitourinary Comments: Exam Deferred   No CVA tenderness noted   Musculoskeletal: Normal range of motion. She exhibits no edema, tenderness or deformity.   Neurological: She is alert and oriented to person, place, and time. No cranial nerve deficit or sensory deficit.   Skin: Skin is warm and dry. Capillary refill takes 2 to 3 seconds. No rash noted. She is not diaphoretic. No erythema.   Psychiatric: She has a normal mood and affect. Her behavior is normal. Judgment and thought content normal.   Nursing note and vitals reviewed.        CRANIAL NERVES     CN III, IV, VI   Pupils are equal, round, and reactive to light.  Extraocular motions are normal.        Significant Labs:   CBC:   Recent Labs   Lab 03/18/20  1827   WBC 7.46   HGB 12.8   HCT 39.5        CMP:   Recent Labs   Lab 03/18/20  1827      K 3.6      CO2 19*      BUN 9   CREATININE 1.0   CALCIUM 9.8   ANIONGAP 14   EGFRNONAA >60     Urine Studies:   Recent Labs   Lab 03/18/20  1913   COLORU Yellow    APPEARANCEUA Clear   PHUR 6.0   SPECGRAV 1.025   PROTEINUA Trace*   GLUCUA Negative   KETONESU 3+*   BILIRUBINUA 1+*   OCCULTUA Negative   NITRITE Negative   UROBILINOGEN Negative   LEUKOCYTESUR Negative     UDS - presumptive positive for Cocaine     All pertinent labs within the past 24 hours have been reviewed.    Significant Imaging: I have reviewed all pertinent imaging results/findings within the past 24 hours.     CT Abdomen/Pelvis With Contrast:  Impression       Right-sided nephrolithiasis with a partially obstructing 5 mm stone in the right distal ureter with marked right-sided hydroureteronephrosis.  Component of superinfection in the right collecting system may be present.  Follow-up with dedicated CT urogram is suggested.    Wall thickening of the small and large bowel loops.  The findings may relate to enterocolitis.  Suggest clinical correlation.    Changes of prior cholecystectomy with persistent intrahepatic and extrahepatic biliary dilatation.    Additional findings as above.         Assessment/Plan:     * Ureterolithiasis  Urology consult, IV Rocephin, IV fluids.      Cocaine use  Patient denies substance abuse disorder, urine drug screen presumptive positive for cocaine.       Hydroureteronephrosis  Urology consult, IV Rocephin, IV fluids.        VTE Risk Mitigation (From admission, onward)         Ordered     IP VTE HIGH RISK PATIENT  Once      03/19/20 0005     Place ANNALISE hose  Until discontinued      03/19/20 0005     Place sequential compression device  Until discontinued      03/19/20 0005               Time spent seeing patient( greater than 1/2 spent in direct contact) : 65 minutes     Harriet Cardoza NP  Department of Hospital Medicine   [facility]

## 2020-03-19 NOTE — PLAN OF CARE
CM completed discharge assessment over the phone with pt. Pt verified information on facesheet as correct. Denies POA/LW. Reports living at listed address with family. PCP is Dr. Sequeira. Pharm is [facility] in [place]. Pt denies hh/hd/dme. Reports being independent with ADLs and able to drive herself to apts. Reports that her sister will provide transportation home upon discharge. DC plan is home. CM following.      03/19/20 5331   Discharge Assessment   Assessment Type Discharge Planning Assessment   Confirmed/corrected address and phone number on facesheet? Yes   Assessment information obtained from? Patient   Communicated expected length of stay with patient/caregiver yes   Prior to hospitilization cognitive status: Alert/Oriented   Prior to hospitalization functional status: Independent   Current cognitive status: Alert/Oriented   Current Functional Status: Independent   Lives With parent(s)   Able to Return to Prior Arrangements yes   Is patient able to care for self after discharge? Yes   Patient's perception of discharge disposition home or selfcare   Readmission Within the Last 30 Days no previous admission in last 30 days   Patient currently being followed by outpatient case management? No   Patient currently receives any other outside agency services? No   Equipment Currently Used at Home none   Do you have any problems affording any of your prescribed medications? No   Is the patient taking medications as prescribed? yes   Does the patient have transportation home? Yes   Transportation Anticipated family or friend will provide   Does the patient receive services at the [facility]? No   Discharge Plan A Home with family   DME Needed Upon Discharge  none   Patient/Family in Agreement with Plan yes

## 2020-03-19 NOTE — PLAN OF CARE
Pt aaox4, denies pain and nausea, tolerating clear liquids, stent dressing cdi, family updated, anestheia states pt  meets anesthesia criteria to transfer to floorI, report given to JULIA Holman

## 2020-03-19 NOTE — ED NOTES
Pt removed IV by accident while undressing, New IV started   Call light handed to pt aware to notify nurse of needs or concerns.

## 2020-03-19 NOTE — ANESTHESIA POSTPROCEDURE EVALUATION
Anesthesia Post Evaluation    Patient: Eleno Kiran    Procedure(s) Performed: Procedure(s) (LRB):  REMOVAL, CALCULUS, URETER, URETEROSCOPIC (Right)  CYSTOSCOPY, WITH URETERAL STENT INSERTION (Right)  LITHOTRIPSY, USING LASER (Right)  CYSTOSCOPY, WITH RETROGRADE PYELOGRAM (Right)    Final Anesthesia Type: general    Patient location during evaluation: PACU  Patient participation: Yes- Able to Participate  Level of consciousness: awake and alert and oriented  Post-procedure vital signs: reviewed and stable  Pain management: adequate  Airway patency: patent  KEYSHA mitigation strategies: Multimodal analgesia  PONV status at discharge: No PONV  Anesthetic complications: no      Cardiovascular status: blood pressure returned to baseline  Respiratory status: unassisted, spontaneous ventilation and room air  Hydration status: euvolemic  Follow-up not needed.          Vitals Value Taken Time   /76 3/19/2020  2:17 PM   Temp 35.7 °C (96.3 °F) 3/19/2020  2:17 PM   Pulse 75 3/19/2020  2:17 PM   Resp 14 3/19/2020  2:17 PM   SpO2 100 % 3/19/2020  2:17 PM         Event Time     Out of Recovery 03/19/2020 14:10:00          Pain/Lamont Score: Pain Rating Prior to Med Admin: 9 (3/18/2020  9:59 PM)  Pain Rating Post Med Admin: 0 (3/19/2020  8:00 AM)  Lamont Score: 10 (3/19/2020  2:15 PM)

## 2020-03-20 ENCOUNTER — TELEPHONE (OUTPATIENT)
Dept: UROLOGY | Facility: CLINIC | Age: 35
End: 2020-03-20

## 2020-03-20 VITALS
TEMPERATURE: 99 F | OXYGEN SATURATION: 100 % | RESPIRATION RATE: 16 BRPM | HEART RATE: 71 BPM | HEIGHT: 66 IN | DIASTOLIC BLOOD PRESSURE: 74 MMHG | BODY MASS INDEX: 31.98 KG/M2 | SYSTOLIC BLOOD PRESSURE: 117 MMHG | WEIGHT: 199 LBS

## 2020-03-20 LAB
ALBUMIN SERPL BCP-MCNC: 3.1 G/DL (ref 3.5–5.2)
ALP SERPL-CCNC: 44 U/L (ref 55–135)
ALT SERPL W/O P-5'-P-CCNC: 9 U/L (ref 10–44)
ANION GAP SERPL CALC-SCNC: 9 MMOL/L (ref 8–16)
AST SERPL-CCNC: 13 U/L (ref 10–40)
BACTERIA UR CULT: NO GROWTH
BASOPHILS # BLD AUTO: 0.01 K/UL (ref 0–0.2)
BASOPHILS NFR BLD: 0.1 % (ref 0–1.9)
BILIRUB SERPL-MCNC: 0.4 MG/DL (ref 0.1–1)
BUN SERPL-MCNC: 5 MG/DL (ref 6–20)
CALCIUM SERPL-MCNC: 8.4 MG/DL (ref 8.7–10.5)
CHLORIDE SERPL-SCNC: 111 MMOL/L (ref 95–110)
CO2 SERPL-SCNC: 16 MMOL/L (ref 23–29)
CREAT SERPL-MCNC: 0.8 MG/DL (ref 0.5–1.4)
DIFFERENTIAL METHOD: ABNORMAL
EOSINOPHIL # BLD AUTO: 0 K/UL (ref 0–0.5)
EOSINOPHIL NFR BLD: 0.1 % (ref 0–8)
ERYTHROCYTE [DISTWIDTH] IN BLOOD BY AUTOMATED COUNT: 13.5 % (ref 11.5–14.5)
EST. GFR  (AFRICAN AMERICAN): >60 ML/MIN/1.73 M^2
EST. GFR  (NON AFRICAN AMERICAN): >60 ML/MIN/1.73 M^2
GLUCOSE SERPL-MCNC: 78 MG/DL (ref 70–110)
HCT VFR BLD AUTO: 32.9 % (ref 37–48.5)
HGB BLD-MCNC: 10.4 G/DL (ref 12–16)
IMM GRANULOCYTES # BLD AUTO: 0.01 K/UL (ref 0–0.04)
IMM GRANULOCYTES NFR BLD AUTO: 0.1 % (ref 0–0.5)
LYMPHOCYTES # BLD AUTO: 2.1 K/UL (ref 1–4.8)
LYMPHOCYTES NFR BLD: 29.2 % (ref 18–48)
MAGNESIUM SERPL-MCNC: 1.9 MG/DL (ref 1.6–2.6)
MCH RBC QN AUTO: 32.4 PG (ref 27–31)
MCHC RBC AUTO-ENTMCNC: 31.6 G/DL (ref 32–36)
MCV RBC AUTO: 103 FL (ref 82–98)
MONOCYTES # BLD AUTO: 0.6 K/UL (ref 0.3–1)
MONOCYTES NFR BLD: 7.8 % (ref 4–15)
NEUTROPHILS # BLD AUTO: 4.5 K/UL (ref 1.8–7.7)
NEUTROPHILS NFR BLD: 62.7 % (ref 38–73)
NRBC BLD-RTO: 0 /100 WBC
PLATELET # BLD AUTO: 200 K/UL (ref 150–350)
PMV BLD AUTO: 10.5 FL (ref 9.2–12.9)
POTASSIUM SERPL-SCNC: 3.5 MMOL/L (ref 3.5–5.1)
PROT SERPL-MCNC: 6.4 G/DL (ref 6–8.4)
RBC # BLD AUTO: 3.21 M/UL (ref 4–5.4)
SODIUM SERPL-SCNC: 136 MMOL/L (ref 136–145)
WBC # BLD AUTO: 7.22 K/UL (ref 3.9–12.7)

## 2020-03-20 PROCEDURE — 83735 ASSAY OF MAGNESIUM: CPT

## 2020-03-20 PROCEDURE — 94761 N-INVAS EAR/PLS OXIMETRY MLT: CPT

## 2020-03-20 PROCEDURE — 80053 COMPREHEN METABOLIC PANEL: CPT

## 2020-03-20 PROCEDURE — 96376 TX/PRO/DX INJ SAME DRUG ADON: CPT

## 2020-03-20 PROCEDURE — 36415 COLL VENOUS BLD VENIPUNCTURE: CPT

## 2020-03-20 PROCEDURE — 63600175 PHARM REV CODE 636 W HCPCS: Performed by: UROLOGY

## 2020-03-20 PROCEDURE — 25000003 PHARM REV CODE 250: Performed by: UROLOGY

## 2020-03-20 PROCEDURE — G0378 HOSPITAL OBSERVATION PER HR: HCPCS

## 2020-03-20 PROCEDURE — 85025 COMPLETE CBC W/AUTO DIFF WBC: CPT

## 2020-03-20 RX ORDER — SCOLOPAMINE TRANSDERMAL SYSTEM 1 MG/1
1 PATCH, EXTENDED RELEASE TRANSDERMAL ONCE
Qty: 1 PATCH | Refills: 0 | Status: SHIPPED | OUTPATIENT
Start: 2020-03-20 | End: 2020-03-20

## 2020-03-20 RX ORDER — IBUPROFEN 800 MG/1
800 TABLET ORAL EVERY 6 HOURS PRN
Qty: 10 TABLET | Refills: 0 | Status: SHIPPED | OUTPATIENT
Start: 2020-03-20 | End: 2020-03-25

## 2020-03-20 RX ORDER — CIPROFLOXACIN 500 MG/1
500 TABLET ORAL EVERY 12 HOURS
Qty: 6 TABLET | Refills: 0 | Status: SHIPPED | OUTPATIENT
Start: 2020-03-20 | End: 2020-03-23

## 2020-03-20 RX ADMIN — ACETAMINOPHEN 650 MG: 325 TABLET ORAL at 12:03

## 2020-03-20 RX ADMIN — CEFTRIAXONE 1 G: 1 INJECTION, SOLUTION INTRAVENOUS at 12:03

## 2020-03-20 NOTE — PROGRESS NOTES
"[facility] Hospital Medicine  Progress Note    Patient Name: Eleno Kiran  MRN: [id number]  Patient Class: OP- Observation   Admission Date: 3/18/2020  Length of Stay: 0 days  Attending Physician: Kayode French MD  Primary Care Provider: Gerhard Sequeira III, MD        Subjective:     Principal Problem:Ureterolithiasis        HPI:  Eleno Kiran is a 34-year-old female with no past medical history who presents to the emergency room tonight with dysuria and suprapubic pain.  Patient states that 2 weeks ago she was seen in the emergency room for similar symptoms and was diagnosed with a UTI and discharged home with antibiotics.  Patient states she took 2 of the antibiotic pills, states she lost the rest of them.  Patient states pain is worse today and is now accompanied with vomiting.  Patient states pain is located to the right flank area, sharp in characteristic, worse with movement.  Patient endorses 6 episodes of vomiting today. Denies fever, chest pain, shortness of breath, diarrhea, or history of kidney stones.  Endorses decreased p.o. intake.  In the emergency room CT of the abdomen pelvis was performed revealing "Right-sided nephrolithiasis with a partially obstructing 5 mm stone in the right distal ureter with marked right-sided hydroureteronephrosis.  Component of superinfection in the right collecting system may be present."  Urology, Dr. Jay was contacted per ER physician and made aware of patient case.  Patient placed in observation on 03/18/2020 at approximately 9:00 p.m..  Patient not accompanied by any visitors during my initial physical exam.    Overview/Hospital Course:  Patient was admitted to hospital medicine. Urology took the patient to the OR.  performed Cystourethroscopy. Right ureteroscopy with laser lithotripsy and basket extraction of stone fragments. Right retrograde pyelogram and  Right ureteral stent placement, 6 x 26 cm JJ stent.  fo Patient " was closely monitored post-operatively. Blood sugar and blood pressure closely followed. Symptoms improved    Interval History: S/p  Cystourethroscopy. Right ureteroscopy with laser lithotripsy and basket extraction of stone fragments. Right retrograde pyelogram and  Right ureteral stent placement, 6 x 26 cm JJ stent.     Review of Systems   Constitutional: Positive for appetite change. Negative for activity change, chills, fatigue and fever.   HENT: Negative for congestion, sinus pressure, sinus pain and sore throat.    Eyes: Negative for photophobia and visual disturbance.   Respiratory: Negative for cough, choking, chest tightness, shortness of breath and wheezing.    Cardiovascular: Negative for chest pain, palpitations and leg swelling.   Gastrointestinal: Positive for vomiting. Negative for abdominal distention, abdominal pain, blood in stool, constipation, diarrhea and nausea.   Genitourinary: Negative for difficulty urinating, dysuria, flank pain and hematuria.   Musculoskeletal: Negative for back pain, gait problem and joint swelling.        R flank pain     Skin: Negative for rash and wound.   Neurological: Negative for dizziness, syncope, weakness, light-headedness, numbness and headaches.   Psychiatric/Behavioral: Negative for confusion. The patient is not nervous/anxious.      Objective:     Vital Signs (Most Recent):  Temp: 98.1 °F (36.7 °C) (03/20/20 0021)  Pulse: 71 (03/20/20 0021)  Resp: 16 (03/20/20 0021)  BP: 104/61 (03/20/20 0021)  SpO2: 98 % (03/20/20 0021) Vital Signs (24h Range):  Temp:  [96.3 °F (35.7 °C)-98.9 °F (37.2 °C)] 98.1 °F (36.7 °C)  Pulse:  [] 71  Resp:  [14-19] 16  SpO2:  [96 %-100 %] 98 %  BP: (104-129)/(59-80) 104/61     Weight: 90.3 kg (199 lb)  Body mass index is 32.12 kg/m².    Intake/Output Summary (Last 24 hours) at 3/20/2020 0726  Last data filed at 3/20/2020 0600  Gross per 24 hour   Intake 2803.33 ml   Output 1200 ml   Net 1603.33 ml      Physical Exam    Constitutional: She is oriented to person, place, and time. She appears well-developed and well-nourished. No distress.   HENT:   Head: Normocephalic and atraumatic.   Eyes: Pupils are equal, round, and reactive to light. EOM are normal. Right eye exhibits no discharge. Left eye exhibits no discharge.   Neck: Normal range of motion. No JVD present.   Cardiovascular: Normal rate, regular rhythm, normal heart sounds and intact distal pulses.   No murmur heard.  Pulmonary/Chest: Effort normal and breath sounds normal. No respiratory distress. She has no wheezes. She has no rales. She exhibits no tenderness.   Patient on room air during my initial interview and physical exam, patient in no acute respiratory distress.   Abdominal: Soft. Bowel sounds are normal. She exhibits no distension. There is tenderness. There is no rebound and no guarding.   Suprapubic pain noted upon palpation   Genitourinary:   Genitourinary Comments: Exam Deferred   No CVA tenderness noted   Musculoskeletal: Normal range of motion. She exhibits no edema, tenderness or deformity.   Neurological: She is alert and oriented to person, place, and time. No cranial nerve deficit or sensory deficit.   Skin: Skin is warm and dry. Capillary refill takes 2 to 3 seconds. No rash noted. She is not diaphoretic. No erythema.   Psychiatric: She has a normal mood and affect. Her behavior is normal. Judgment and thought content normal.   Nursing note and vitals reviewed.      Significant Labs:   BMP:   Recent Labs   Lab 03/20/20  0449   GLU 78      K 3.5   *   CO2 16*   BUN 5*   CREATININE 0.8   CALCIUM 8.4*   MG 1.9     CBC:   Recent Labs   Lab 03/18/20  1827 03/19/20  0445 03/20/20  0449   WBC 7.46 8.07 7.22   HGB 12.8 11.7* 10.4*   HCT 39.5 36.1* 32.9*    210 200       Significant Imaging: I have reviewed all pertinent imaging results/findings within the past 24 hours.      Assessment/Plan:      * Ureterolithiasis  Urology consult, IV  Rocephin, IV fluids.  urology took the patient to the OR.  performed Cystourethroscopy. Right ureteroscopy with laser lithotripsy and basket extraction of stone fragments. Right retrograde pyelogram and  Right ureteral stent placement, 6 x 26 cm JJ stent.  for now      Cocaine use  Patient denies substance abuse disorder, urine drug screen presumptive positive for cocaine.       Hydroureteronephrosis  S/p  Right ureteroscopy with laser lithotripsy and basket extraction of stone fragments. Right retrograde pyelogram and  Right ureteral stent placement, 6 x 26 cm JJ stent.  for now        VTE Risk Mitigation (From admission, onward)         Ordered     IP VTE HIGH RISK PATIENT  Once      03/19/20 0005     Place ANNALISE hose  Until discontinued      03/19/20 0005     Place sequential compression device  Until discontinued      03/19/20 0005                      Gabbie Motta MD  Department of Hospital Medicine   [facility]

## 2020-03-20 NOTE — TELEPHONE ENCOUNTER
Called and spoke to patient regarding a sooner appointment. Per Dr. Mai wanted to follow up with patient in 4 week post-op. Patient scheduled for appointment with provider.

## 2020-03-20 NOTE — PLAN OF CARE
CM attempted to schedule follow up apt with Dr. Mai. Per [facility]  ([facility]), no availability in needed time frame. Stated that she will send a message to office and they will call pt to schedule.      03/20/20 1141   Discharge Assessment   Assessment Type Discharge Planning Reassessment

## 2020-03-20 NOTE — PLAN OF CARE
Patient oriented to room, call light within reach, wheels locked, bed in low position, side rails x 2, instructed to call for assistance prn. POC reviewed with patient, all questions answered, verbalized understanding. VSS. Patient remained free of fall/injury. Comfort level established, c/o pain controlled with prn medication. IVF infusing per order. Patient repositioned independently, no new breakdown noted. Will continue to monitor.

## 2020-03-20 NOTE — PLAN OF CARE
03/20/20 1210   Final Note   Assessment Type Final Discharge Note   Anticipated Discharge Disposition Home   Hospital Follow Up  Appt(s) scheduled? Yes

## 2020-03-20 NOTE — ASSESSMENT & PLAN NOTE
Urology consult, IV Rocephin, IV fluids.  urology took the patient to the OR.  performed Cystourethroscopy. Right ureteroscopy with laser lithotripsy and basket extraction of stone fragments. Right retrograde pyelogram and  Right ureteral stent placement, 6 x 26 cm JJ stent.  for now

## 2020-03-20 NOTE — HOSPITAL COURSE
Patient was admitted to hospital medicine. Urology took the patient to the OR.  performed Cystourethroscopy. Right ureteroscopy with laser lithotripsy and basket extraction of stone fragments. Right retrograde pyelogram and  Right ureteral stent placement, 6 x 26 cm JJ stent.  fo Patient was closely monitored post-operatively. Blood sugar and blood pressure closely followed. Symptoms improved.  Patient was continued on antibiotics and remained afebrile for the remainder of her hospitalization.  She was discharged home on antibiotics to continue as an outpatient.  She was given follow-up with urology and primary care.

## 2020-03-20 NOTE — ASSESSMENT & PLAN NOTE
S/p  Right ureteroscopy with laser lithotripsy and basket extraction of stone fragments. Right retrograde pyelogram and  Right ureteral stent placement, 6 x 26 cm JJ stent.  for now

## 2020-03-20 NOTE — SUBJECTIVE & OBJECTIVE
Interval History: S/p  Cystourethroscopy. Right ureteroscopy with laser lithotripsy and basket extraction of stone fragments. Right retrograde pyelogram and  Right ureteral stent placement, 6 x 26 cm JJ stent.     Review of Systems   Constitutional: Positive for appetite change. Negative for activity change, chills, fatigue and fever.   HENT: Negative for congestion, sinus pressure, sinus pain and sore throat.    Eyes: Negative for photophobia and visual disturbance.   Respiratory: Negative for cough, choking, chest tightness, shortness of breath and wheezing.    Cardiovascular: Negative for chest pain, palpitations and leg swelling.   Gastrointestinal: Positive for vomiting. Negative for abdominal distention, abdominal pain, blood in stool, constipation, diarrhea and nausea.   Genitourinary: Negative for difficulty urinating, dysuria, flank pain and hematuria.   Musculoskeletal: Negative for back pain, gait problem and joint swelling.        R flank pain     Skin: Negative for rash and wound.   Neurological: Negative for dizziness, syncope, weakness, light-headedness, numbness and headaches.   Psychiatric/Behavioral: Negative for confusion. The patient is not nervous/anxious.      Objective:     Vital Signs (Most Recent):  Temp: 98.1 °F (36.7 °C) (03/20/20 0021)  Pulse: 71 (03/20/20 0021)  Resp: 16 (03/20/20 0021)  BP: 104/61 (03/20/20 0021)  SpO2: 98 % (03/20/20 0021) Vital Signs (24h Range):  Temp:  [96.3 °F (35.7 °C)-98.9 °F (37.2 °C)] 98.1 °F (36.7 °C)  Pulse:  [] 71  Resp:  [14-19] 16  SpO2:  [96 %-100 %] 98 %  BP: (104-129)/(59-80) 104/61     Weight: 90.3 kg (199 lb)  Body mass index is 32.12 kg/m².    Intake/Output Summary (Last 24 hours) at 3/20/2020 0726  Last data filed at 3/20/2020 0600  Gross per 24 hour   Intake 2803.33 ml   Output 1200 ml   Net 1603.33 ml      Physical Exam   Constitutional: She is oriented to person, place, and time. She appears well-developed and well-nourished. No distress.    HENT:   Head: Normocephalic and atraumatic.   Eyes: Pupils are equal, round, and reactive to light. EOM are normal. Right eye exhibits no discharge. Left eye exhibits no discharge.   Neck: Normal range of motion. No JVD present.   Cardiovascular: Normal rate, regular rhythm, normal heart sounds and intact distal pulses.   No murmur heard.  Pulmonary/Chest: Effort normal and breath sounds normal. No respiratory distress. She has no wheezes. She has no rales. She exhibits no tenderness.   Patient on room air during my initial interview and physical exam, patient in no acute respiratory distress.   Abdominal: Soft. Bowel sounds are normal. She exhibits no distension. There is tenderness. There is no rebound and no guarding.   Suprapubic pain noted upon palpation   Genitourinary:   Genitourinary Comments: Exam Deferred   No CVA tenderness noted   Musculoskeletal: Normal range of motion. She exhibits no edema, tenderness or deformity.   Neurological: She is alert and oriented to person, place, and time. No cranial nerve deficit or sensory deficit.   Skin: Skin is warm and dry. Capillary refill takes 2 to 3 seconds. No rash noted. She is not diaphoretic. No erythema.   Psychiatric: She has a normal mood and affect. Her behavior is normal. Judgment and thought content normal.   Nursing note and vitals reviewed.      Significant Labs:   BMP:   Recent Labs   Lab 03/20/20  0449   GLU 78      K 3.5   *   CO2 16*   BUN 5*   CREATININE 0.8   CALCIUM 8.4*   MG 1.9     CBC:   Recent Labs   Lab 03/18/20  1827 03/19/20  0445 03/20/20  0449   WBC 7.46 8.07 7.22   HGB 12.8 11.7* 10.4*   HCT 39.5 36.1* 32.9*    210 200       Significant Imaging: I have reviewed all pertinent imaging results/findings within the past 24 hours.

## 2020-03-20 NOTE — TELEPHONE ENCOUNTER
----- Message from Odalys Aranda sent at 3/20/2020 11:39 AM CDT -----  Type:  Sooner Apoointment Request    Caller is requesting a sooner appointment.  Caller declined first available appointment listed below.  Caller will not accept being placed on the waitlist and is requesting a message be sent to doctor.    Name of Caller:  pt  When is the first available appointment?    Several  m [facility]  Symptoms: 4  Week   [facility]  Hospital  follow  Best Call Back Number: [phone]  Additional Information:  4  Week  Follow up

## 2020-03-20 NOTE — NURSING
Discharge instructions given to patient she verbalized understanding. All questions and concerns answered. Follow up appts scheduled. Removed piv she tolerated well. Prescriptions efaxed to pt's pharmacy. Pt left via wheelchair relinquished care.

## 2020-03-20 NOTE — NURSING
Discharge instructions given to patient she verbalized understanding. Removed piv she tolerated well. Prescriptions escripted to pharmacy. All questions and concerns answered. Follow up appts scheduled, and instructed on how to removed stent. Pt left via wheelchair relinquished care.

## 2020-03-20 NOTE — RESPIRATORY THERAPY
03/19/20 2102   Patient Assessment/Suction   Level of Consciousness (AVPU) responds to voice   Rhythm/Pattern, Respiratory depth regular;pattern regular;unlabored   PRE-TX-O2   O2 Device (Oxygen Therapy) room air   SpO2 98 %   Pulse 77   Resp 16   Temp 98.9 °F (37.2 °C)   BP (!) 121/59

## 2020-03-22 NOTE — DISCHARGE SUMMARY
"[facility] Hospital Medicine  Discharge Summary      Patient Name: Eleno Kiran  MRN: [id number]  Admission Date: 3/18/2020  Hospital Length of Stay: 0 days  Discharge Date and Time: 3/20/2020  1:00 PM  Attending Physician: No att. providers found   Discharging Provider: Kayode French MD  Primary Care Provider: Gerhard Sequeira III, MD      HPI:   Eleno Kiran is a 34-year-old female with no past medical history who presents to the emergency room tonight with dysuria and suprapubic pain.  Patient states that 2 weeks ago she was seen in the emergency room for similar symptoms and was diagnosed with a UTI and discharged home with antibiotics.  Patient states she took 2 of the antibiotic pills, states she lost the rest of them.  Patient states pain is worse today and is now accompanied with vomiting.  Patient states pain is located to the right flank area, sharp in characteristic, worse with movement.  Patient endorses 6 episodes of vomiting today. Denies fever, chest pain, shortness of breath, diarrhea, or history of kidney stones.  Endorses decreased p.o. intake.  In the emergency room CT of the abdomen pelvis was performed revealing "Right-sided nephrolithiasis with a partially obstructing 5 mm stone in the right distal ureter with marked right-sided hydroureteronephrosis.  Component of superinfection in the right collecting system may be present."  Urology, Dr. Jay was contacted per ER physician and made aware of patient case.  Patient placed in observation on 03/18/2020 at approximately 9:00 p.m..  Patient not accompanied by any visitors during my initial physical exam.    Procedure(s) (LRB):  REMOVAL, CALCULUS, URETER, URETEROSCOPIC (Right)  CYSTOSCOPY, WITH URETERAL STENT INSERTION (Right)  LITHOTRIPSY, USING LASER (Right)  CYSTOSCOPY, WITH RETROGRADE PYELOGRAM (Right)      Hospital Course:   Patient was admitted to hospital medicine. Urology took the patient to the " OR.  performed Cystourethroscopy. Right ureteroscopy with laser lithotripsy and basket extraction of stone fragments. Right retrograde pyelogram and  Right ureteral stent placement, 6 x 26 cm JJ stent.  fo Patient was closely monitored post-operatively. Blood sugar and blood pressure closely followed. Symptoms improved.  Patient was continued on antibiotics and remained afebrile for the remainder of her hospitalization.  She was discharged home on antibiotics to continue as an outpatient.  She was given follow-up with urology and primary care.     Consults:   Consults (From admission, onward)        Status Ordering Provider     Inpatient consult to Urology  Once     Provider:  Shelley Mai Jr., MD    Completed EBEN POZO          No new Assessment & Plan notes have been filed under this hospital service since the last note was generated.  Service: Hospital Medicine    Final Active Diagnoses:    Diagnosis Date Noted POA    PRINCIPAL PROBLEM:  Ureterolithiasis [N20.1] 03/18/2020 Yes    Hydroureteronephrosis [N13.30] 03/18/2020 Yes    Cocaine use [F14.90] 03/18/2020 Yes      Problems Resolved During this Admission:       Discharged Condition: stable    Disposition: Home or Self Care    Follow Up:  Follow-up Information     Gerhard Sequeira III, MD In 2 weeks.    Specialty:  General Surgery  Contact information:  [address]  [address]  [facility] [address]  [phone]             Shelley Mia Jr, MD In 4 weeks.    Specialty:  Urology  Why:  office should call you to schedule.   Contact information:  [address] [place] [address]  [address]  [facility] [address]  [phone]                 Patient Instructions:      Diet Adult Regular     Notify your health care provider if you experience any of the following:  temperature >100.4     Notify your health care provider if you experience any of the following:  persistent nausea and vomiting or diarrhea     Notify your health care provider if  you experience any of the following:  severe uncontrolled pain     Activity as tolerated       Significant Diagnostic Studies:     Pending Diagnostic Studies:     None         Medications:  Reconciled Home Medications:      Medication List      START taking these medications    ciprofloxacin HCl 500 MG tablet  Commonly known as:  CIPRO  Take 1 tablet (500 mg total) by mouth every 12 (twelve) hours. for 3 days     ibuprofen 800 MG tablet  Commonly known as:  ADVIL,MOTRIN  Take 1 tablet (800 mg total) by mouth every 6 (six) hours as needed for Pain.        ASK your doctor about these medications    scopolamine 1.3-1.5 mg (1 mg over 3 days)  Commonly known as:  TRANSDERM-SCOP  Place 1 patch onto the skin once. for 1 dose  Ask about: Should I take this medication?            Indwelling Lines/Drains at time of discharge:   Lines/Drains/Airways     Drain                 Ureteral Drain/Stent 03/19/20 1340 Right ureter 6 Fr. 2 days                Time spent on the discharge of patient: 38 minutes  Patient was seen and examined on the date of discharge and determined to be suitable for discharge.         Kayode French MD  Department of Hospital Medicine  [facility]

## 2020-03-23 ENCOUNTER — TELEPHONE (OUTPATIENT)
Dept: MEDSURG UNIT | Facility: HOSPITAL | Age: 35
End: 2020-03-23

## 2020-03-24 LAB
COMPN STONE: NORMAL
SPECIMEN SOURCE: NORMAL
STONE ANALYSIS IR-IMP: NORMAL

## 2020-04-02 ENCOUNTER — TELEPHONE (OUTPATIENT)
Dept: UROLOGY | Facility: CLINIC | Age: 35
End: 2020-04-02

## 2020-04-02 NOTE — TELEPHONE ENCOUNTER
Called patient to change upcoming appointment to Virtual Visit with provider due to COVID-19. No answer. LMOM to give the office a call back.

## 2020-04-03 ENCOUNTER — TELEPHONE (OUTPATIENT)
Dept: UROLOGY | Facility: CLINIC | Age: 35
End: 2020-04-03

## 2020-04-03 NOTE — TELEPHONE ENCOUNTER
Called patient to change upcoming appointment to Virtual Visit with provider. No answer. LMOM to give the office a call back.

## 2020-04-06 ENCOUNTER — TELEPHONE (OUTPATIENT)
Dept: UROLOGY | Facility: CLINIC | Age: 35
End: 2020-04-06

## 2020-04-06 NOTE — TELEPHONE ENCOUNTER
Called patient regarding upcoming appointment to switch to Virtual Visit. No answer. LMOM to give the office a call back.

## 2020-05-08 ENCOUNTER — HOSPITAL ENCOUNTER (EMERGENCY)
Facility: HOSPITAL | Age: 35
Discharge: LEFT BEFORE DISCHARGE | End: 2020-05-08
Attending: EMERGENCY MEDICINE
Payer: MEDICAID

## 2020-05-08 VITALS
BODY MASS INDEX: 31.34 KG/M2 | SYSTOLIC BLOOD PRESSURE: 107 MMHG | RESPIRATION RATE: 20 BRPM | DIASTOLIC BLOOD PRESSURE: 56 MMHG | HEIGHT: 66 IN | HEART RATE: 99 BPM | OXYGEN SATURATION: 99 % | WEIGHT: 195 LBS | TEMPERATURE: 99 F

## 2020-05-08 DIAGNOSIS — Z53.29 LEFT AGAINST MEDICAL ADVICE: Primary | ICD-10-CM

## 2020-05-08 PROCEDURE — 99281 EMR DPT VST MAYX REQ PHY/QHP: CPT
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
## 2020-05-08 NOTE — ED NOTES
[facility], spoke to Deputy Gerber.
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
## 2020-05-20 ENCOUNTER — TELEPHONE (OUTPATIENT)
Dept: UROLOGY | Facility: CLINIC | Age: 35
End: 2020-05-20

## 2020-05-20 NOTE — TELEPHONE ENCOUNTER
Called and spoke to patient per Dr. Ott to clarify if patient removed stent. Patient stated that she did not remove stent. Stated that it kept getting snagged on her undergarments so she cut the string and could not remove the stent. Informed patient that message will be sent to provider and will receive a call back. Called [place] lab/medical records for urine culture to be faxed to the office.    Please Advise.

## 2020-05-20 NOTE — TELEPHONE ENCOUNTER
Called via transfer center overnight approx 2am to be patched in to ER doc at [place]    He reported patient had stone surgery with Dr. Mai on 03/19 and has had difficulty getting her stent removed secondary to the COVID outbreak.  She reported being febrile at home and had concern for UTI, though was afebrile in the emergency department.    Advised that procedures have resumed and will notify Dr. Mai and his staff, asked that he send the urine for culture, give her an IV dose of antibiotics, and start her on empiric p.o. antibiotics, and I would message Dr. Mai and his staff to set up for stent removal    On review of chart  . Patient can remove her stent at home on Monday, 3/23/20 (instructions in AVS). She will follow up in clinic in 4 weeks.   - chart review also indicates patient unable unable to be reached for follow-up and has not followed up    Please call patient to clarify if she has removed her stent, f/u on culture from [facility] when available, and review with Dr Mai  - ER visit should be in care everywhere when signed

## 2020-05-21 NOTE — TELEPHONE ENCOUNTER
Called patient to schedule for an in-clinic cysto stent removal on a Thursday morning per Dr. Mai. No answer. Mailbox full. Unable to leave message on machine.

## 2020-05-22 ENCOUNTER — TELEPHONE (OUTPATIENT)
Dept: UROLOGY | Facility: CLINIC | Age: 35
End: 2020-05-22

## 2020-05-22 NOTE — TELEPHONE ENCOUNTER
Called patient to schedule in-clinic cysto sent removal. No answer. Unable to leave message on machine.

## 2020-05-26 ENCOUNTER — TELEPHONE (OUTPATIENT)
Dept: UROLOGY | Facility: CLINIC | Age: 35
End: 2020-05-26

## 2020-05-26 NOTE — TELEPHONE ENCOUNTER
Called and patient to schedule in-clinic cysto stent removal. No answer. LMOM to give the office a call back.

## 2020-06-03 ENCOUNTER — TELEPHONE (OUTPATIENT)
Dept: UROLOGY | Facility: CLINIC | Age: 35
End: 2020-06-03

## 2020-06-03 NOTE — TELEPHONE ENCOUNTER
Called and spoke to patient regarding scheduling in-clinic stent removal. Patient stated that she will be able to come in 6/4 for 8:30am for cysto stent removal. Appointment scheduled. Patient confirmed.

## 2020-06-04 ENCOUNTER — TELEPHONE (OUTPATIENT)
Dept: UROLOGY | Facility: CLINIC | Age: 35
End: 2020-06-04

## 2020-06-23 ENCOUNTER — TELEPHONE (OUTPATIENT)
Dept: UROLOGY | Facility: CLINIC | Age: 35
End: 2020-06-23

## 2020-06-23 NOTE — TELEPHONE ENCOUNTER
Called patient to scheduled in-clinic stent removal. No answer. LMOM to give the office a call back.

## 2020-07-09 ENCOUNTER — TELEPHONE (OUTPATIENT)
Dept: UROLOGY | Facility: CLINIC | Age: 35
End: 2020-07-09

## 2020-07-09 NOTE — TELEPHONE ENCOUNTER
Attempted to call patient to schedule in clinic stent removal. No answer. Mailbox full. Unable to LMOM.

## 2020-07-21 ENCOUNTER — HOSPITAL ENCOUNTER (EMERGENCY)
Facility: HOSPITAL | Age: 35
Discharge: HOME | End: 2020-07-21
Attending: EMERGENCY MEDICINE
Payer: MEDICAID

## 2020-07-21 VITALS
BODY MASS INDEX: 30.63 KG/M2 | DIASTOLIC BLOOD PRESSURE: 62 MMHG | TEMPERATURE: 99 F | SYSTOLIC BLOOD PRESSURE: 117 MMHG | HEIGHT: 67 IN | RESPIRATION RATE: 16 BRPM | WEIGHT: 195.13 LBS | OXYGEN SATURATION: 99 % | HEART RATE: 110 BPM

## 2020-07-21 DIAGNOSIS — N30.90 CYSTITIS: Primary | ICD-10-CM

## 2020-07-21 LAB
B-HCG UR QL: NEGATIVE
BACTERIA #/AREA URNS HPF: ABNORMAL /HPF
BILIRUB UR QL STRIP: ABNORMAL
CLARITY UR: ABNORMAL
COLOR UR: YELLOW
CTP QC/QA: YES
GLUCOSE UR QL STRIP: NEGATIVE
HGB UR QL STRIP: ABNORMAL
HYALINE CASTS #/AREA URNS LPF: 0 /LPF
KETONES UR QL STRIP: ABNORMAL
LEUKOCYTE ESTERASE UR QL STRIP: ABNORMAL
MICROSCOPIC COMMENT: ABNORMAL
NITRITE UR QL STRIP: POSITIVE
PH UR STRIP: 7 [PH] (ref 5–8)
PROT UR QL STRIP: ABNORMAL
RBC #/AREA URNS HPF: >100 /HPF (ref 0–4)
SP GR UR STRIP: 1.02 (ref 1–1.03)
SQUAMOUS #/AREA URNS HPF: 3 /HPF
URN SPEC COLLECT METH UR: ABNORMAL
UROBILINOGEN UR STRIP-ACNC: 1 EU/DL
WBC #/AREA URNS HPF: >100 /HPF (ref 0–5)

## 2020-07-21 PROCEDURE — 87086 URINE CULTURE/COLONY COUNT: CPT

## 2020-07-21 PROCEDURE — 96372 THER/PROPH/DIAG INJ SC/IM: CPT

## 2020-07-21 PROCEDURE — 87088 URINE BACTERIA CULTURE: CPT

## 2020-07-21 PROCEDURE — 81000 URINALYSIS NONAUTO W/SCOPE: CPT

## 2020-07-21 PROCEDURE — 87077 CULTURE AEROBIC IDENTIFY: CPT

## 2020-07-21 PROCEDURE — 81025 URINE PREGNANCY TEST: CPT | Performed by: EMERGENCY MEDICINE

## 2020-07-21 PROCEDURE — 63600175 PHARM REV CODE 636 W HCPCS: Performed by: EMERGENCY MEDICINE

## 2020-07-21 PROCEDURE — 99284 EMERGENCY DEPT VISIT MOD MDM: CPT | Mod: 25

## 2020-07-21 PROCEDURE — 87186 SC STD MICRODIL/AGAR DIL: CPT

## 2020-07-21 RX ORDER — AMOXICILLIN AND CLAVULANATE POTASSIUM 875; 125 MG/1; MG/1
1 TABLET, FILM COATED ORAL 2 TIMES DAILY
Qty: 14 TABLET | Refills: 0 | Status: SHIPPED | OUTPATIENT
Start: 2020-07-21 | End: 2020-07-24 | Stop reason: SDUPTHER

## 2020-07-21 RX ORDER — CEFTRIAXONE 1 G/1
1 INJECTION, POWDER, FOR SOLUTION INTRAMUSCULAR; INTRAVENOUS
Status: COMPLETED | OUTPATIENT
Start: 2020-07-21 | End: 2020-07-21

## 2020-07-21 RX ADMIN — CEFTRIAXONE SODIUM 1 G: 1 INJECTION, POWDER, FOR SOLUTION INTRAMUSCULAR; INTRAVENOUS at 11:07

## 2020-07-21 NOTE — LETTER
07/25/2020      CERTIFIED-RETURN RECEIPT REQUESTED           Eleno Kiran  [address] [address] [place] [address] [place] [address]    YOB: 1985  MRN:               [id number]      Dear  Eleno:    Multiple attempts to contact you via the provided telephone have been unsuccessful. This written correspondence is to inform you that your test results from your recent visit have abnormal results. Please return to the emergency room immediately or call [phone] and ask to speak with a clinician for further information.    Sincerely,   Arlette Connolly                    [address] [place] [id number]  Phone: [phone]

## 2020-07-22 NOTE — ED PROVIDER NOTES
"Encounter Date: 2020    SCRIBE #1 NOTE: I, Olga Garza, am scribing for, and in the presence of, Mansoor Mesa MD.       History     Chief Complaint   Patient presents with    Dysuria     Time seen by provider: 10:31 PM on 2020    Eleno Kiran is a 35 y.o. female who presents to the ED with an onset of dysuria, blood in urine and burning while urinating. The patient states that she has had a bladder stent for several weeks that was suppose to be removed. Her doctor has been calling her but she has not been answering nor calling him back "due to anxiety." Her last menstrual cycle was a little over 2 weeks ago. The patient denies chest pain, SOB, cough, fever, vomiting or any other symptoms at this time. PSHx includes abdominal surgery, right ureteroscopic removal of ureteric calculus, laser lithotripsy, and cystoscopy with ureteral stent placement.    The history is provided by the patient.     Review of patient's allergies indicates:  No Known Allergies  History reviewed. No pertinent past medical history.  Past Surgical History:   Procedure Laterality Date    ABDOMINAL SURGERY       SECTION      CYSTOSCOPY W/ RETROGRADES Right 3/19/2020    Procedure: CYSTOSCOPY, WITH RETROGRADE PYELOGRAM;  Surgeon: Shelley Mai Jr., MD;  Location: [facility] OR;  Service: Urology;  Laterality: Right;    CYSTOSCOPY W/ URETERAL STENT PLACEMENT Right 3/19/2020    Procedure: CYSTOSCOPY, WITH URETERAL STENT INSERTION;  Surgeon: Shelley Mai Jr., MD;  Location: [facility] OR;  Service: Urology;  Laterality: Right;    LASER LITHOTRIPSY Right 3/19/2020    Procedure: LITHOTRIPSY, USING LASER;  Surgeon: Shelley Mai Jr., MD;  Location: [facility] OR;  Service: Urology;  Laterality: Right;    URETEROSCOPIC REMOVAL OF URETERIC CALCULUS Right 3/19/2020    Procedure: REMOVAL, CALCULUS, URETER, URETEROSCOPIC;  Surgeon: Shelley Mai Jr., MD;  Location: [facility] OR;  Service: Urology;  Laterality: Right;     Family " "History   Problem Relation Age of Onset    Cancer Maternal Aunt      Social History     Tobacco Use    Smoking status: Former Smoker    Smokeless tobacco: Never Used   Substance Use Topics    Alcohol use: Not Currently    Drug use: Yes     Types: Marijuana, Methamphetamines, Amphetamines, "Crack" cocaine     Review of Systems   Constitutional: Negative for fever.   HENT: Negative for sore throat.    Respiratory: Negative for shortness of breath.    Cardiovascular: Negative for chest pain.   Gastrointestinal: Negative for nausea and vomiting.   Genitourinary: Positive for dysuria and hematuria.        + burning while urinating   Musculoskeletal: Negative for back pain.   Skin: Negative for rash.   Neurological: Negative for weakness.   Hematological: Does not bruise/bleed easily.       Physical Exam     Initial Vitals [07/21/20 2206]   BP Pulse Resp Temp SpO2   117/62 110 16 98.5 °F (36.9 °C) 99 %      MAP       --         Physical Exam    Nursing note and vitals reviewed.  Constitutional: She appears well-developed and well-nourished. She is not diaphoretic. No distress.   HENT:   Head: Normocephalic and atraumatic.   Eyes: EOM are normal. Pupils are equal, round, and reactive to light.   Neck: Normal range of motion. Neck supple.   Cardiovascular: Normal rate, regular rhythm, normal heart sounds and intact distal pulses. Exam reveals no gallop and no friction rub.    No murmur heard.  Pulmonary/Chest: Breath sounds normal. No respiratory distress. She has no wheezes. She has no rhonchi. She has no rales.   Abdominal: Soft. Bowel sounds are normal. There is no abdominal tenderness. There is no rebound and no guarding.   Musculoskeletal: Normal range of motion.   Neurological: She is alert and oriented to person, place, and time.   Skin: Skin is warm and dry.   Psychiatric: She has a normal mood and affect. Her behavior is normal. Judgment and thought content normal.         ED Course   Procedures  Labs Reviewed "   URINALYSIS, REFLEX TO URINE CULTURE - Abnormal; Notable for the following components:       Result Value    Appearance, UA Cloudy (*)     Protein, UA 2+ (*)     Ketones, UA Trace (*)     Bilirubin (UA) 1+ (*)     Occult Blood UA 2+ (*)     Nitrite, UA Positive (*)     Leukocytes, UA 3+ (*)     All other components within normal limits    Narrative:     Specimen Source->Urine   URINALYSIS MICROSCOPIC - Abnormal; Notable for the following components:    RBC, UA >100 (*)     WBC, UA >100 (*)     Bacteria Moderate (*)     All other components within normal limits    Narrative:     Specimen Source->Urine   CULTURE, URINE   POCT URINE PREGNANCY          Imaging Results    None          Medical Decision Making:   History:   Old Medical Records: I decided to obtain old medical records.  Initial Assessment:   35-year-old female presented with dysuria.  Differential Diagnosis:   Initial differential diagnosis included but not limited to cystitis, pyelonephritis, and bladder irritation.  Clinical Tests:   Lab Tests: Ordered and Reviewed  ED Management:  The patient was emergently evaluated in the emergency department, her evaluation was significant for a young female with benign abdominal exam.  Patient's urine does have evidence of infection.  She likely has a cystitis.  She was treated with a dose of parental Rocephin here in the emergency department.  She is stable for discharge home.  She will be discharged home with p.o. Augmentin and she is instructed that she needs to call her urologist tomorrow for further care and removal of her bladder stent.  The patient reports that she will comply with this as well.            Scribe Attestation:   Scribe #1: I performed the above scribed service and the documentation accurately describes the services I performed. I attest to the accuracy of the note.              I, Dr. Mansoor Mesa, personally performed the services described in this documentation. All medical record entries  made by the scribe were at my direction and in my presence.  I have reviewed the chart and agree that the record reflects my personal performance and is accurate and complete. Mansoor Mesa MD.  2:12 AM 07/22/2020               Clinical Impression:       ICD-10-CM ICD-9-CM   1. Cystitis  N30.90 595.9         Disposition:   Disposition: Discharged  Condition: Stable     ED Disposition Condition    Discharge Stable        ED Prescriptions     Medication Sig Dispense Start Date End Date Auth. Provider    amoxicillin-clavulanate 875-125mg (AUGMENTIN) 875-125 mg per tablet Take 1 tablet by mouth 2 (two) times daily. 14 tablet 7/21/2020  Mansoor Mesa MD        Follow-up Information     Follow up With Specialties Details Why Contact Info    Shelley Mai Jr., MD Urology Call in 1 day  [address] [place] [address] DR  [address]  [facility] [address]  [phone]                                       Mansoor Mesa MD  07/22/20 0212

## 2020-07-22 NOTE — ED NOTES
Assumed care:  Eleno Kiran is awake, alert and oriented x 3, skin warm and dry, in NAD.  Patient states that she has a urethral stint that was placed a while back.  States that now she is having lower abd pain that travels to the vaginal area.  CO burning and pain.    Patient identifiers for Eleno Kiran checked and correct.  LOC:  Eleno Kiran is awake, alert, and aware of environment with an appropriate affect. She is oriented x 3 and speaking appropriately.  APPEARANCE:  She is resting comfortably and in no acute distress. She is clean and well groomed, patient's clothing is properly fastened.  SKIN:  The skin is warm and dry. She has normal skin turgor and moist mucus membranes. Skin is intact; no bruising or breakdown noted.  MUSCULOSKELETAL:  She is moving all extremities well, no obvious deformities noted. Pulses intact.   RESPIRATORY:  Airway is open and patent. Respirations are spontaneous and non-labored with normal effort and rate.  CARDIAC:  She has a normal rate and rhythm. No peripheral edema noted. Capillary refill < 3 seconds.  ABDOMEN:  No distention noted.  Soft and non-tender upon palpation.  Burning with urination and lower abd pain  NEUROLOGICAL:  PERRL. Facial expression is symmetrical. Hand grasps are equal bilaterally. Normal sensation in all extremities when touched with finger.  Allergies reported:  Review of patient's allergies indicates:  No Known Allergies  OTHER NOTES:

## 2020-07-24 ENCOUNTER — PROCEDURE VISIT (OUTPATIENT)
Dept: UROLOGY | Facility: CLINIC | Age: 35
End: 2020-07-24
Payer: MEDICAID

## 2020-07-24 ENCOUNTER — TELEPHONE (OUTPATIENT)
Dept: UROLOGY | Facility: CLINIC | Age: 35
End: 2020-07-24

## 2020-07-24 VITALS
TEMPERATURE: 98 F | DIASTOLIC BLOOD PRESSURE: 63 MMHG | WEIGHT: 194 LBS | HEIGHT: 66 IN | BODY MASS INDEX: 31.18 KG/M2 | SYSTOLIC BLOOD PRESSURE: 95 MMHG | HEART RATE: 82 BPM | RESPIRATION RATE: 18 BRPM

## 2020-07-24 DIAGNOSIS — N20.0 NEPHROLITHIASIS: Primary | ICD-10-CM

## 2020-07-24 LAB — BACTERIA UR CULT: ABNORMAL

## 2020-07-24 PROCEDURE — 52310 CYSTOSCOPY AND TREATMENT: CPT | Mod: PBBFAC,PN | Performed by: UROLOGY

## 2020-07-24 PROCEDURE — 52310 PR CYSTOSCOPY,REMV CALCULUS,SIMPLE: ICD-10-PCS | Mod: S$PBB,,, | Performed by: UROLOGY

## 2020-07-24 PROCEDURE — 52310 CYSTOSCOPY AND TREATMENT: CPT | Mod: S$PBB,,, | Performed by: UROLOGY

## 2020-07-24 RX ORDER — QUETIAPINE FUMARATE 200 MG/1
200 TABLET, FILM COATED ORAL NIGHTLY
COMMUNITY
Start: 2020-07-11 | End: 2021-06-18

## 2020-07-24 RX ORDER — NITROFURANTOIN 25; 75 MG/1; MG/1
CAPSULE ORAL
COMMUNITY
Start: 2020-06-23 | End: 2021-06-18

## 2020-07-24 RX ORDER — OLANZAPINE 10 MG/1
10 TABLET ORAL NIGHTLY
COMMUNITY
Start: 2020-07-11 | End: 2021-06-18

## 2020-07-24 RX ORDER — LAMOTRIGINE 25 MG/1
TABLET ORAL
COMMUNITY
Start: 2020-06-30 | End: 2021-06-18

## 2020-07-24 RX ORDER — NAPROXEN 500 MG/1
TABLET ORAL
COMMUNITY
Start: 2020-05-21 | End: 2021-06-18

## 2020-07-24 RX ORDER — FLUOXETINE 10 MG/1
CAPSULE ORAL
COMMUNITY
Start: 2020-07-11 | End: 2021-06-18

## 2020-07-24 RX ORDER — CEPHALEXIN 500 MG/1
CAPSULE ORAL
COMMUNITY
Start: 2020-05-21 | End: 2021-06-18

## 2020-07-24 RX ORDER — BUSPIRONE HYDROCHLORIDE 15 MG/1
TABLET ORAL
COMMUNITY
Start: 2020-06-23 | End: 2021-06-18

## 2020-07-24 NOTE — PROCEDURES
[facility]  Operative/Discharge Note    Date: 07/24/2020    Pre-Op Diagnosis: Nephrolithiasis    Post-Op Diagnosis: Same    Procedure(s) Performed:   1.  Cystoscopy  2.  Right ureteral stent removal    Specimen(s): None    Staff Surgeon: Shelley Mai MD    Assistant Surgeon: Shelley Mai Jr, MD    Anesthesia: Local anesthesia topical 2% lidocaine gel    Indications: Eleno Kiran is a 35 y.o. female with nephrolithiasis.     Patient presented to the emergency department on 3/18/20 complaining of severe right flank pain for approximately 2 weeks associatd with nausea and emesis.      She reports being diagnosed with a urinary tract infection 2 weeks prior to arrival. She was treated with antibiotics and her symptoms improved.      CT abd/pelvis with contrast in the emergency department revealed an obstructing 5 mm right distal ureteral stone with severe hydroureteronephrosis. Urology consulted to assist with management.    She is s/p right ureteroscopy with laser lithotripsy on 3/19/20. Patient had a string left on her stent, but cut it due to irritation. She never followed up until today. Recently found to have a UTI and is on Augmentin.      She denies any fever, chills, gross hematuria or history of  malignancy.      Of note, her urine drug screen was positive for cocaine.     Findings: Successful cysto stent removal in clinic. Gentamicin given pre-procedure. Patient is on Augmentin for a UTI. Had been lost to follow up for 4 months.     Estimated Blood Loss: min    Drains: None    Procedure in Detail:  After risks, benefits and possible complications of cystoscopy were explained, the patient elected to undergo the procedure and informed consent was obtained. All questions were answered in the salvador-operative area. The patient was transferred to the cystoscopy suite and placed in the lithotomy position.  The patient was prepped and draped in the usual sterile fashion. Lidocaine jelly was  administered for local anesthesia. Time out was performed.    A flexible cystoscope was introduced into the bladder per urethra. This passed easily.  The entire urethra was visualized which showed no masses or strictures.  The right and left ureteral orifices were identified in the normal anatomic position and were seen effluxing clear urine.  Formal cystoscopy was performed which revealed no masses or lesions suspicious for malignancy, no trabeculations, no bladder stones and no bladder diverticuli.    The distal tip of the right ureteral stent was then grasped using the flexible stent graspers. The stent was removed in its entirety. The patient tolerated the procedure well and was transferred to recovery in stable condition.    Disposition: Home    Discharge home today status post uncomplicated procedure as above  Diet - resume home diet  Follow up: Return to clinic PRN. Patient is not interested in any further urologic follow up and states she just wanted her stent out today due to discomfort. Understood risks given that she has an infection.   Instructions: Continue Augmentin and stone prevention.   Meds:     Medication List          Accurate as of July 24, 2020  4:39 PM. If you have any questions, ask your nurse or doctor.            CONTINUE taking these medications    busPIRone 15 MG tablet  Commonly known as: BUSPAR     cephALEXin 500 MG capsule  Commonly known as: KEFLEX     FLUoxetine 10 MG capsule     lamoTRIgine 25 MG tablet  Commonly known as: LAMICTAL     naproxen 500 MG tablet  Commonly known as: NAPROSYN     nitrofurantoin (macrocrystal-monohydrate) 100 MG capsule  Commonly known as: MACROBID     OLANZapine 10 MG tablet  Commonly known as: ZyPREXA     QUEtiapine 200 MG Tab  Commonly known as: SEROQUEL            Shelley Mai Jr, MD

## 2020-07-24 NOTE — TELEPHONE ENCOUNTER
"Per provider: "Spoke to patient over the phone to stress importance of follow up today for stent removal. Explained that she could potentially lose her kidney or develop a serious infection without the appropriate follow up. She states she will follow up today for stent removal."   "

## 2020-07-24 NOTE — TELEPHONE ENCOUNTER
F/U on patient due to stent that has been in place for 4 months. Patient was recently seen in the ER 7/21. Patient did not call the office once discharged as instructed. Called patient to schedule appt for stent removal. No answer. Unable to LMOM.

## 2020-07-24 NOTE — TELEPHONE ENCOUNTER
Called and spoke to patient to confirm that she will be coming in for stent removal before 4pm. Patient confirmed.

## 2020-12-08 NOTE — TELEPHONE ENCOUNTER
Called patient to schedule for a clinic cysto stent removal on a Thursday morning per Dr. Mai. No answer. Mailbox full. Unable to leave message on machine.   Electrophysiology

## 2021-06-18 PROBLEM — A59.01 TRICHOMONAS VAGINITIS: Status: RESOLVED | Noted: 2019-10-19 | Resolved: 2021-06-18

## 2021-06-18 PROBLEM — Z01.419 WOMEN'S ANNUAL ROUTINE GYNECOLOGICAL EXAMINATION: Status: ACTIVE | Noted: 2021-06-18

## 2021-06-18 PROBLEM — B37.31 YEAST VAGINITIS: Status: ACTIVE | Noted: 2021-06-18

## 2021-09-21 NOTE — ED NOTES
[facility] Medicine Services  DISCHARGE SUMMARY    Patient Name: Nuzhat Lindo  : 1974  MRN: [id number]    Date of Admission: 2021  Date of Discharge:  2021  Primary Care Physician: Christa Rothman APRN      Presenting Problem:   Ureteral obstruction, left [N13.5]  Other hydronephrosis [N13.39]    Active and Resolved Hospital Problems:  Active Hospital Problems    Diagnosis POA   • **Ureteral obstruction, left [N13.5] Unknown   • Moderate malnutrition (CMS/HCC) [E44.0] Yes   • Renal insufficiency [N28.9] Yes   • Neuroendocrine carcinoma, unknown primary site (CMS/HCC) [C7A.8] Yes   • Anemia [D64.9] Yes   • Hyperlipidemia [E78.5] Yes   • Diabetes mellitus (CMS/HCC) [E11.9] Yes   • Alcohol abuse, continuous drinking behavior [F10.10] Yes   • Gastroesophageal reflux disease [K21.9] Yes      Resolved Hospital Problems    Diagnosis POA   • Other hydronephrosis [N13.39] Yes         Hospital Course     Hospital Course by problem list.    46-year-old female with diagnosis of neuroendocrine tumor involving the pelvis, patient admitted with left flank pain, patient recognized to have left hydronephrosis related to left pelvic mass.  Seen by urology service, patient underwent cystoscopy left retrograde pyelogram left ureteral stent placement, she says her pain is improved significantly.  She follows with Dr. Capps oncology, she has the appointment in a week time.  Patient requesting for the discharge.  Will repeat BMP before discharge and patient advised to follow-up with nephrology service outpatient and also with urology for close monitoring of renal function test.    Condition on discharge stable.        DISCHARGE Follow Up Recommendations with family physician in a week time  Follow-up with oncology service in 1 week time  Follow-up with nephrology service in a week time for repeat BMP outpatient  Follow-up with the urology service and they will decide about the  Pt reports her headache has improved to a 7/10 and she is no longer feeling nauseous. Additional warm blanket provided. No other needs voiced at this time.   removal of the stent outpatient.    Reasons For Change In Medications and Indications for New Medications:      Day of Discharge     Vital Signs:  Temp:  [97.1 °F (36.2 °C)-98.3 °F (36.8 °C)] 97.7 °F (36.5 °C)  Heart Rate:  [57-94] 84  Resp:  [8-25] 17  BP: (115-166)/() 151/89    Physical Exam:  Physical Exam  Vitals and nursing note reviewed.   Constitutional:       General: She is not in acute distress.     Appearance: Normal appearance. She is well-developed. She is not ill-appearing, toxic-appearing or diaphoretic.   HENT:      Head: Normocephalic and atraumatic.      Right Ear: Ear canal and external ear normal.      Left Ear: Ear canal and external ear normal.      Nose: Nose normal. No congestion or rhinorrhea.      Mouth/Throat:      Mouth: Mucous membranes are moist.      Pharynx: No oropharyngeal exudate.   Eyes:      General: No scleral icterus.        Right eye: No discharge.         Left eye: No discharge.      Extraocular Movements: Extraocular movements intact.      Conjunctiva/sclera: Conjunctivae normal.      Pupils: Pupils are equal, round, and reactive to light.   Neck:      Thyroid: No thyromegaly.      Vascular: No carotid bruit or JVD.      Trachea: No tracheal deviation.   Cardiovascular:      Rate and Rhythm: Normal rate and regular rhythm.      Pulses: Normal pulses.      Heart sounds: Normal heart sounds. No murmur heard.   No friction rub. No gallop.    Pulmonary:      Effort: Pulmonary effort is normal. No respiratory distress.      Breath sounds: Normal breath sounds. No stridor. No wheezing, rhonchi or rales.   Chest:      Chest wall: No tenderness.   Abdominal:      General: Bowel sounds are normal. There is no distension.      Palpations: Abdomen is soft. There is no mass.      Tenderness: There is no abdominal tenderness. There is no guarding or rebound.      Hernia: No hernia is present.   Musculoskeletal:         General: No swelling, tenderness, deformity or signs of injury.  Normal range of motion.      Cervical back: Normal range of motion and neck supple. No rigidity. No muscular tenderness.      Right lower leg: No edema.      Left lower leg: No edema.   Lymphadenopathy:      Cervical: No cervical adenopathy.   Skin:     General: Skin is warm and dry.      Coloration: Skin is not jaundiced or pale.      Findings: No bruising, erythema or rash.   Neurological:      General: No focal deficit present.      Mental Status: She is alert and oriented to person, place, and time. Mental status is at baseline.      Cranial Nerves: No cranial nerve deficit.      Sensory: No sensory deficit.      Motor: No weakness or abnormal muscle tone.      Coordination: Coordination normal.   Psychiatric:         Mood and Affect: Mood normal.         Behavior: Behavior normal.         Thought Content: Thought content normal.         Judgment: Judgment normal.            Pertinent  and/or Most Recent Results     LAB RESULTS:      Lab 09/20/21  1601   WBC 3.00*   HEMOGLOBIN 10.0*   HEMATOCRIT 29.6*   PLATELETS 171   NEUTROS ABS 2.20   LYMPHS ABS 0.30*   MONOS ABS 0.40   EOS ABS 0.10   MCV 89.3         Lab 09/21/21  0451 09/20/21  1601   SODIUM 134* 131*   POTASSIUM 4.3 4.0   CHLORIDE 94* 92*   CO2 30.0* 29.0   ANION GAP 10.0 10.0   BUN 25* 23*   CREATININE 2.20* 2.08*   GLUCOSE 88 112*   CALCIUM 9.2 9.3         Lab 09/20/21  1601   TOTAL PROTEIN 7.1   ALBUMIN 4.20   GLOBULIN 2.9   ALT (SGPT) 9   AST (SGOT) 13   BILIRUBIN 0.3   ALK PHOS 80                 Lab 09/20/21  2220   IRON 24*   IRON SATURATION 8*   TIBC 297*   TRANSFERRIN 199*         Brief Urine Lab Results  (Last result in the past 365 days)      Color   Clarity   Blood   Leuk Est   Nitrite   Protein   CREAT   Urine HCG        09/20/21 1847 Yellow Clear Negative Negative Negative Negative             Microbiology Results (last 10 days)     Procedure Component Value - Date/Time    COVID PRE-OP / PRE-PROCEDURE SCREENING ORDER (NO ISOLATION) - Swab,  Nasopharynx [[id number]]  (Normal) Collected: 09/21/21 0739    Lab Status: Final result Specimen: Swab from Nasopharynx Updated: 09/21/21 0805    Narrative:      The following orders were created for panel order COVID PRE-OP / PRE-PROCEDURE SCREENING ORDER (NO ISOLATION) - Swab, Nasopharynx.  Procedure                               Abnormality         Status                     ---------                               -----------         ------                     COVID-19,CEPHEID/CADEN/BD...[[id number]]  Normal              Final result                 Please view results for these tests on the individual orders.    COVID-19,CEPHEID/CADEN/BDMAX,COR/CHEMO/PAD/TONIA IN-HOUSE(OR EMERGENT/ADD-ON),NP SWAB IN TRANSPORT MEDIA 3-4 HR TAT, RT-PCR - Swab, Nasopharynx [[id number]]  (Normal) Collected: 09/21/21 0739    Lab Status: Final result Specimen: Swab from Nasopharynx Updated: 09/21/21 0805     COVID19 Not Detected    Narrative:      Fact sheet for providers: https://www.fda.gov/media/600651/download     Fact sheet for patients: https://www.fda.gov/media/629140/download  Fact sheet for providers: https://www.fda.gov/media/598526/download     Fact sheet for patients: https://www.fda.gov/media/423740/download    COVID PRE-OP / PRE-PROCEDURE SCREENING ORDER (NO ISOLATION) - Swab, Nasopharynx [[id number]]  (Normal) Collected: 09/20/21 1906    Lab Status: Final result Specimen: Swab from Nasopharynx Updated: 09/20/21 2010    Narrative:      The following orders were created for panel order COVID PRE-OP / PRE-PROCEDURE SCREENING ORDER (NO ISOLATION) - Swab, Nasopharynx.  Procedure                               Abnormality         Status                     ---------                               -----------         ------                     COVID-19,CEPHEID/CADEN/BD...[[id number]]  Normal              Final result                 Please view results for these tests on the individual orders.    COVID-19,CEPHEID/CADEN/BDMAX,COR/CHEMO/PAD/TONIA  IN-HOUSE(OR EMERGENT/ADD-ON),NP SWAB IN TRANSPORT MEDIA 3-4 HR TAT, RT-PCR - Swab, Nasopharynx [[id number]]  (Normal) Collected: 09/20/21 1906    Lab Status: Final result Specimen: Swab from Nasopharynx Updated: 09/20/21 2010     COVID19 Not Detected    Narrative:      Fact sheet for providers: https://www.fda.gov/media/339097/download     Fact sheet for patients: https://www.fda.gov/media/138799/download  Fact sheet for providers: https://www.fda.gov/media/005414/download     Fact sheet for patients: https://www.fda.gov/media/983126/download          US Renal Bilateral    Result Date: 9/20/2021  Impression: 1. There is a moderate left hydronephrosis and distention of the visualized portion of the left upper-mid ureter.  Electronically Signed By-Gabriela Maldonado MD On:9/20/2021 6:25 PM This report was finalized on [id number] by  Gabriela Maldonado MD.    CT Abdomen Pelvis Stone Protocol    Result Date: 9/21/2021  Impression:  1. Abnormal long segment thickening of the sigmoid colon. The findings could reflect changes of colitis, the findings are  worrisome for colonic malignancy. Consider correlation with colonoscopy. 2. 3.6 x 3.1 cm soft tissue mass in the pelvis, left of midline, at the level of the sacral promontory. It appears contiguous to the sigmoid colon and could represent exophytic extension of colonic malignancy. It also appears to abut the left adnexa/left ovary, although ovarian primary malignancy is thought much less likely. Consider surgical consultation. 3. There is moderate left hydronephrosis secondary to distal ureteral obstruction by the aforementioned pelvic mass. 5. Moderate colonic stool burden. 6. The low-density mass lesion within the right hepatic lobe has diminished in size since 7/7/2021. No new liver lesions are evident on this noncontrast study.    Electronically Signed By-Briseida Gibson MD On:9/21/2021 9:27 AM This report was finalized on [id number] by  Briseida Gibson  MD.              Results for orders placed during the hospital encounter of 08/15/20    Adult Transthoracic Echo Limited W/ Cont if Necessary Per Protocol    Interpretation Summary  · Estimated EF = 60%.  · Left ventricular systolic function is normal.    Indications  Chest pain    Technically satisfactory study.  Mitral valve is structurally normal.  Tricuspid valve is structurally normal.  Aortic valve is structurally normal.  Pulmonic valve could not be well visualized.  No evidence for mitral tricuspid or aortic regurgitation is seen by Doppler study.  Left atrium is normal in size.  Right atrium is normal in size.  Left ventricle is normal in size and contractility with ejection fraction of 60%.  Right ventricle is normal in size.  Atrial septum is intact.  Aorta is normal.  No pericardial effusion or intracardiac thrombus is seen.    Impression  Structurally and functionally normal cardiac valves.  Left ventricular size and contractility is normal with ejection fraction of 60%'      Labs Pending at Discharge:      Procedures Performed  Procedure(s):  CYSTOSCOPY LEFT RETROGRADE PYLEOGRM LEFT URETERAL CATHETER/STENT INSERTION         Consults:   Consults     Date and Time Order Name Status Description    9/21/2021  1:34 PM Hematology & Oncology Inpatient Consult      9/21/2021 12:34 PM Inpatient Gastroenterology Consult      9/20/2021  8:55 PM Inpatient Urology Consult      9/20/2021  6:49 PM Nephrology (on -call MD unless specified) Completed     9/20/2021  6:49 PM Urology (on-call MD unless specified) Completed     9/20/2021  6:49 PM Hospitalist (on-call MD unless specified) Completed             Discharge Details        Discharge Medications      Continue These Medications      Instructions Start Date   acetaminophen 500 MG tablet  Commonly known as: TYLENOL   500 mg, Oral, Every 4 Hours PRN      ARIPiprazole 5 MG tablet  Commonly known as: ABILIFY   5 mg, Oral, Daily      escitalopram 20 MG tablet  Commonly  known as: LEXAPRO   20 mg, Oral, Every Morning      fluticasone 50 MCG/ACT nasal spray  Commonly known as: Flonase   2 sprays, Nasal, Daily      folic acid 1 MG tablet  Commonly known as: FOLVITE   TAKE 1 TABLET BY MOUTH EVERY DAY       gabapentin 100 MG capsule  Commonly known as: NEURONTIN   100 mg, Oral, Nightly      levothyroxine 25 MCG tablet  Commonly known as: SYNTHROID, LEVOTHROID   TAKE 1 TABLET BY MOUTH EVERY DAY       lidocaine-prilocaine 2.5-2.5 % cream  Commonly known as: EMLA   Topical, As Needed, 30 minutes prior to port access. Cover with Saran wrap.      LORazepam 1 MG tablet  Commonly known as: ATIVAN   1 mg, Oral, Every 8 Hours PRN      metoprolol tartrate 50 MG tablet  Commonly known as: LOPRESSOR   TAKE 1 TABLET BY MOUTH TWO TIMES A DAY       oxyCODONE 5 MG immediate release tablet  Commonly known as: Roxicodone   5 mg, Oral, Every 4 Hours PRN      pantoprazole 40 MG EC tablet  Commonly known as: Protonix   40 mg, Oral, Daily      promethazine 12.5 MG tablet  Commonly known as: PHENERGAN   12.5 mg, Oral, Every 6 Hours PRN             Allergies   Allergen Reactions   • Codeine Rash   • Dilantin  [Phenytoin] Rash   • Fosaprepitant Hives and Itching   • Vancomycin Rash   • Zosyn [Piperacillin Sod-Tazobactam So] Rash         Discharge Disposition:   Home or Self Care    Diet:  Hospital:  Diet Order   Procedures   • NPO Diet         Discharge Activity:         CODE STATUS:  Code Status and Medical Interventions:   Ordered at: 09/21/21 1004     Level Of Support Discussed With:    Patient     Code Status:    CPR     Medical Interventions (Level of Support Prior to Arrest):    Full         Future Appointments   Date Time Provider Department Center   9/29/2021 10:30 AM INF ROOM 25 - CHAIR A  CHEMO BH LAG CC NA LAG   9/29/2021 12:30 PM Sarah Capps MD [facility] ONC NA CHEMO   10/13/2021  9:00 AM CHEMO CC PET BH CHEMO PET CHEMO   10/14/2021  1:00 PM Genaro Ruiz MD [facility] RO CHEMO None   10/18/2021  1:00 PM Christa Rothman  APRN [facility] CHEMO   6/3/2022 11:00 AM Tata Hutchins MD [facility] BEH NA CHEMO           Time spent on Discharge including face to face service 33 minutes    This patient has been examined wearing appropriate Personal Protective Equipment and discussed with hospital infection control department. 09/21/21      Signature:

## 2022-01-27 ENCOUNTER — HOSPITAL ENCOUNTER (EMERGENCY)
Facility: HOSPITAL | Age: 37
Discharge: HOME | End: 2022-01-27
Attending: EMERGENCY MEDICINE
Payer: MEDICARE

## 2022-01-27 VITALS
HEIGHT: 66 IN | BODY MASS INDEX: 36.96 KG/M2 | SYSTOLIC BLOOD PRESSURE: 100 MMHG | WEIGHT: 230 LBS | OXYGEN SATURATION: 99 % | TEMPERATURE: 99 F | DIASTOLIC BLOOD PRESSURE: 64 MMHG | HEART RATE: 100 BPM | RESPIRATION RATE: 20 BRPM

## 2022-01-27 DIAGNOSIS — J06.9 VIRAL URI: ICD-10-CM

## 2022-01-27 DIAGNOSIS — Z20.822 SUSPECTED COVID-19 VIRUS INFECTION: Primary | ICD-10-CM

## 2022-01-27 LAB
INFLUENZA A, MOLECULAR: NEGATIVE
INFLUENZA B, MOLECULAR: NEGATIVE
SARS-COV-2 RNA RESP QL NAA+PROBE: DETECTED
SARS-COV-2- CYCLE NUMBER: 25
SPECIMEN SOURCE: NORMAL

## 2022-01-27 PROCEDURE — 87502 INFLUENZA DNA AMP PROBE: CPT | Performed by: NURSE PRACTITIONER

## 2022-01-27 PROCEDURE — 99282 EMERGENCY DEPT VISIT SF MDM: CPT

## 2022-01-27 PROCEDURE — U0003 INFECTIOUS AGENT DETECTION BY NUCLEIC ACID (DNA OR RNA); SEVERE ACUTE RESPIRATORY SYNDROME CORONAVIRUS 2 (SARS-COV-2) (CORONAVIRUS DISEASE [COVID-19]), AMPLIFIED PROBE TECHNIQUE, MAKING USE OF HIGH THROUGHPUT TECHNOLOGIES AS DESCRIBED BY CMS-2020-01-R: HCPCS | Performed by: NURSE PRACTITIONER

## 2022-01-27 PROCEDURE — U0005 INFEC AGEN DETEC AMPLI PROBE: HCPCS | Performed by: NURSE PRACTITIONER

## 2022-01-27 NOTE — ED PROVIDER NOTES
"Encounter Date: 2022       History     Chief Complaint   Patient presents with    COVID-19 Concerns    Fatigue    Nasal Congestion     Headache, congestion, fatigue. Thinks she has covid. States does not feel well. Symptoms have been present for past 2 days. Has taken OTC meds with no improvement in symptoms.         Review of patient's allergies indicates:  No Known Allergies  Past Medical History:   Diagnosis Date    Mental disorder      Past Surgical History:   Procedure Laterality Date    ABDOMINAL SURGERY       SECTION      CYSTOSCOPY W/ RETROGRADES Right 3/19/2020    Procedure: CYSTOSCOPY, WITH RETROGRADE PYELOGRAM;  Surgeon: Shelley Mai Jr., MD;  Location: [facility] OR;  Service: Urology;  Laterality: Right;    CYSTOSCOPY W/ URETERAL STENT PLACEMENT Right 3/19/2020    Procedure: CYSTOSCOPY, WITH URETERAL STENT INSERTION;  Surgeon: Shelley Mai Jr., MD;  Location: [facility] OR;  Service: Urology;  Laterality: Right;    LASER LITHOTRIPSY Right 3/19/2020    Procedure: LITHOTRIPSY, USING LASER;  Surgeon: Shelley Mai Jr., MD;  Location: [facility] OR;  Service: Urology;  Laterality: Right;    TUBAL LIGATION      URETEROSCOPIC REMOVAL OF URETERIC CALCULUS Right 3/19/2020    Procedure: REMOVAL, CALCULUS, URETER, URETEROSCOPIC;  Surgeon: Shelley Mai Jr., MD;  Location: [facility] OR;  Service: Urology;  Laterality: Right;     Family History   Problem Relation Age of Onset    Cancer Maternal Aunt      Social History     Tobacco Use    Smoking status: Former Smoker    Smokeless tobacco: Never Used   Substance Use Topics    Alcohol use: Yes     Comment: socially    Drug use: Not Currently     Types: Marijuana, Methamphetamines, Amphetamines, "Crack" cocaine, Heroin     Review of Systems   Constitutional: Positive for fatigue. Negative for fever.   HENT: Positive for congestion. Negative for sore throat.    Respiratory: Positive for cough. Negative for wheezing.    Cardiovascular: Negative for chest " pain and palpitations.   Gastrointestinal: Negative for abdominal pain, diarrhea, nausea and vomiting.   Musculoskeletal: Positive for myalgias.   Neurological: Positive for headaches.   All other systems reviewed and are negative.      Physical Exam     Initial Vitals   BP Pulse Resp Temp SpO2   01/27/22 0944 01/27/22 0940 01/27/22 0940 01/27/22 0940 01/27/22 0940   100/64 100 20 98.5 °F (36.9 °C) 99 %      MAP       --                Physical Exam    Nursing note and vitals reviewed.  Constitutional: She appears well-developed and well-nourished.   HENT:   Head: Normocephalic and atraumatic.   Eyes: EOM are normal.   Neck: Neck supple.   Cardiovascular: Normal rate and regular rhythm.   Pulmonary/Chest: Breath sounds normal. She has no wheezes.   Abdominal: Abdomen is soft. There is no abdominal tenderness.   Musculoskeletal:      Cervical back: Neck supple.     Neurological: She is alert and oriented to person, place, and time.   Skin: Skin is warm and dry. Capillary refill takes less than 2 seconds.   Psychiatric: She has a normal mood and affect.         ED Course   Procedures  Labs Reviewed   INFLUENZA A & B BY MOLECULAR   SARS-COV-2 (COVID-19) QUALITATIVE PCR          Imaging Results    None          Medications - No data to display                       Clinical Impression:   Final diagnoses:  [Z20.822] Suspected COVID-19 virus infection (Primary)  [J06.9] Viral URI          ED Disposition Condition    Discharge Good        ED Prescriptions     None        Follow-up Information     Follow up With Specialties Details Why Contact Info    Gerhard Sequeira III, MD General Surgery  As needed [address]  [address]  [facility] [address]  [phone]             Jia Hayden, SHIRA  01/27/22 7994

## 2022-01-27 NOTE — DISCHARGE INSTRUCTIONS
Your Covid swab is PENDING  You influenza swab was NEGATIVE  Increase fluid intake  Tylenol or Ibuprofen as needed

## 2022-01-27 NOTE — Clinical Note
"Eleno"Ernie Kiran was seen and treated in our emergency department on 1/27/2022.     COVID-19 is present in our communities across the state. There is limited testing for COVID at this time, so not all patients can be tested. In this situation, your employee meets the following criteria:    Eleno Kiran has met the criteria for COVID-19 testing based upon symptoms, travel, and/or potential exposure. The test has been completed and is pending results at this time. During this time the employee is not able to work and should be quarantined per the Centers for Disease Control timelines.     If you have any questions or concerns, or if I can be of further assistance, please do not hesitate to contact me.    Sincerely,              RN"

## 2022-01-31 ENCOUNTER — HOSPITAL ENCOUNTER (EMERGENCY)
Facility: HOSPITAL | Age: 37
Discharge: HOME | End: 2022-01-31
Attending: FAMILY MEDICINE
Payer: MEDICARE

## 2022-01-31 VITALS
WEIGHT: 202 LBS | DIASTOLIC BLOOD PRESSURE: 71 MMHG | HEIGHT: 66 IN | HEART RATE: 135 BPM | RESPIRATION RATE: 24 BRPM | OXYGEN SATURATION: 100 % | BODY MASS INDEX: 32.47 KG/M2 | SYSTOLIC BLOOD PRESSURE: 143 MMHG | TEMPERATURE: 98 F

## 2022-01-31 DIAGNOSIS — R19.7 DIARRHEA, UNSPECIFIED TYPE: Primary | ICD-10-CM

## 2022-01-31 PROCEDURE — 99282 EMERGENCY DEPT VISIT SF MDM: CPT

## 2022-01-31 RX ORDER — ALPRAZOLAM 0.5 MG/1
0.5 TABLET ORAL 3 TIMES DAILY
COMMUNITY

## 2022-01-31 RX ORDER — OLANZAPINE 10 MG/1
10 TABLET ORAL NIGHTLY
COMMUNITY

## 2022-02-01 NOTE — ED PROVIDER NOTES
Encounter Date: 2022       History     Chief Complaint   Patient presents with    COVID-19 Concerns     Pt tested positive for covid 22. Pt reports worsening sob and diarrhea.      Patient comes our facility with concerns over recent COVID diagnosis.  Patient was diagnosed with COVID-19 on 2022.  Patient states she has had some dyspnea on exertion since then.  She also developed diarrhea that apparently is worsening.  Patient describes watery diarrhea.  She has been making poor food choices like eating pizza or chicken biscuits.  Patient does have an appetite but just complains of watery diarrhea.  Patient was satting 100% oxygen saturation on room air.  She denies any shortness of breath now but feels she gets dyspneic with minimal exertion.  Patient does have a dry cough.  She does continue to vape.  He denies any fevers or chills.  She was concerned about her mother who lives in a house well as or chilled have developed diarrhea.        Review of patient's allergies indicates:  No Known Allergies  Past Medical History:   Diagnosis Date    Mental disorder     Schizophrenia      Past Surgical History:   Procedure Laterality Date    ABDOMINAL SURGERY       SECTION      CYSTOSCOPY W/ RETROGRADES Right 3/19/2020    Procedure: CYSTOSCOPY, WITH RETROGRADE PYELOGRAM;  Surgeon: Shelley Mai Jr., MD;  Location: [facility] OR;  Service: Urology;  Laterality: Right;    CYSTOSCOPY W/ URETERAL STENT PLACEMENT Right 3/19/2020    Procedure: CYSTOSCOPY, WITH URETERAL STENT INSERTION;  Surgeon: Shelley Mai Jr., MD;  Location: [facility] OR;  Service: Urology;  Laterality: Right;    LASER LITHOTRIPSY Right 3/19/2020    Procedure: LITHOTRIPSY, USING LASER;  Surgeon: Shelley Mai Jr., MD;  Location: [facility] OR;  Service: Urology;  Laterality: Right;    TUBAL LIGATION      URETEROSCOPIC REMOVAL OF URETERIC CALCULUS Right 3/19/2020    Procedure: REMOVAL, CALCULUS, URETER, URETEROSCOPIC;  Surgeon: Shelley ROBERTS  "Sergei Blevins MD;  Location: [facility];  Service: Urology;  Laterality: Right;     Family History   Problem Relation Age of Onset    Cancer Maternal Aunt      Social History     Tobacco Use    Smoking status: Former Smoker    Smokeless tobacco: Never Used   Substance Use Topics    Alcohol use: Yes     Comment: socially    Drug use: Not Currently     Types: Marijuana, Methamphetamines, Amphetamines, "Crack" cocaine, Heroin     Review of Systems   Constitutional: Negative for fever.   Respiratory: Positive for cough, shortness of breath (Dyspnea on exertion) and wheezing.    Cardiovascular: Negative for chest pain and palpitations.   Gastrointestinal: Positive for diarrhea. Negative for abdominal pain and nausea.   All other systems reviewed and are negative.      Physical Exam     Initial Vitals [01/31/22 1954]   BP Pulse Resp Temp SpO2   (!) 143/71 (!) 135 (!) 24 98.2 °F (36.8 °C) 100 %      MAP       --         Physical Exam    Nursing note and vitals reviewed.  Constitutional: She appears well-developed and well-nourished. She is not diaphoretic. No distress.   HENT:   Head: Normocephalic and atraumatic.   Nose: Nose normal.   Eyes: Conjunctivae and EOM are normal. Right eye exhibits no discharge. Left eye exhibits no discharge. No scleral icterus.   Neck: Neck supple. No thyromegaly present.   Normal range of motion.  Cardiovascular: Normal rate, regular rhythm, normal heart sounds and intact distal pulses.   No murmur heard.  Pulmonary/Chest: Breath sounds normal. No respiratory distress. She has no rhonchi. She has no rales. She exhibits no tenderness.   Abdominal: Abdomen is soft. Bowel sounds are normal. She exhibits no distension and no mass. There is no abdominal tenderness. There is no rebound and no guarding.   Musculoskeletal:         General: No tenderness. Normal range of motion.      Cervical back: Normal range of motion and neck supple.     Lymphadenopathy:     She has no cervical adenopathy. "   Neurological: She is alert and oriented to person, place, and time. She has normal strength. GCS score is 15. GCS eye subscore is 4. GCS verbal subscore is 5. GCS motor subscore is 6.   Skin: Skin is warm and dry. Capillary refill takes less than 2 seconds. No rash noted. No erythema.   Psychiatric: She has a normal mood and affect. Her behavior is normal. Judgment and thought content normal.         ED Course   Procedures  Labs Reviewed - No data to display       Imaging Results    None          Medications - No data to display  Medical Decision Making:   ED Management:  Patient can our facility be evaluated for diarrhea and dyspnea on exertion.  Patient had recent COVID-19 infection 4 days prior.  Upon arriving to the ER, the patient found out that her kids without diarrhea.  Patient states that she does not want to stay for any testing will follow-up in urgent care.  At that time I did have a discussion with diet choices as well as the course of COVID.  I discussed that this could be a viral infection that is separate from COVID as causing her to have diarrhea as well as her kids.  Patient will follow-up in urgent care.  Patient was instructed she can return to ER for further evaluation.  Patient voiced understanding.                      Clinical Impression:   Final diagnoses:  [R19.7] Diarrhea, unspecified type - Concerns for relation to COVID. (Primary)          ED Disposition Condition    Discharge Stable        ED Prescriptions     None        Follow-up Information    None     Follow-up with primary care physician or urgent care.  Return to ER if condition worsens.     Fredy Carmichael MD  01/31/22 6093

## 2022-02-26 ENCOUNTER — HOSPITAL ENCOUNTER (EMERGENCY)
Facility: HOSPITAL | Age: 37
Discharge: LEFT BEFORE DISCHARGE | End: 2022-02-26
Attending: EMERGENCY MEDICINE
Payer: MEDICARE

## 2022-02-26 VITALS
OXYGEN SATURATION: 100 % | HEIGHT: 69 IN | WEIGHT: 210 LBS | HEART RATE: 116 BPM | RESPIRATION RATE: 18 BRPM | SYSTOLIC BLOOD PRESSURE: 114 MMHG | DIASTOLIC BLOOD PRESSURE: 60 MMHG | TEMPERATURE: 98 F | BODY MASS INDEX: 31.1 KG/M2

## 2022-02-26 DIAGNOSIS — R45.1 AGITATION: Primary | ICD-10-CM

## 2022-02-26 PROCEDURE — 99283 EMERGENCY DEPT VISIT LOW MDM: CPT
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
## 2022-02-27 NOTE — ED NOTES
"Pt wanting to leave AMA and  stated she was did not want to follow procedure to be transferred because her lab results "would have no explanation" and stated she did want to to wait all night in the hospital. MD and charge nurse notified and MD and charge nurse discussed options with the pt.   "

## 2022-02-27 NOTE — ED PROVIDER NOTES
"Encounter Date: 2022       History     Chief Complaint   Patient presents with    Mental Health Problem     Did some meth was going to [facility] but was told to come here for evaluation      36-year-old female presents to the ED stating that she intended to be sent to the crisis stabilization unit in [facility] they told me to come here and get labs 1st patient denies any suicidal homicidal ideation but states that she does have a methamphetamine use problem, she feels that she is in control over actions and is in a safe situation at home        Review of patient's allergies indicates:  No Known Allergies  Past Medical History:   Diagnosis Date    Mental disorder     Schizophrenia      Past Surgical History:   Procedure Laterality Date    ABDOMINAL SURGERY       SECTION      CYSTOSCOPY W/ RETROGRADES Right 3/19/2020    Procedure: CYSTOSCOPY, WITH RETROGRADE PYELOGRAM;  Surgeon: Shelley Mai Jr., MD;  Location: [facility] OR;  Service: Urology;  Laterality: Right;    CYSTOSCOPY W/ URETERAL STENT PLACEMENT Right 3/19/2020    Procedure: CYSTOSCOPY, WITH URETERAL STENT INSERTION;  Surgeon: Shelley Mai Jr., MD;  Location: [facility] OR;  Service: Urology;  Laterality: Right;    LASER LITHOTRIPSY Right 3/19/2020    Procedure: LITHOTRIPSY, USING LASER;  Surgeon: Shelley Mai Jr., MD;  Location: [facility] OR;  Service: Urology;  Laterality: Right;    TUBAL LIGATION      URETEROSCOPIC REMOVAL OF URETERIC CALCULUS Right 3/19/2020    Procedure: REMOVAL, CALCULUS, URETER, URETEROSCOPIC;  Surgeon: Shelley Mai Jr., MD;  Location: [facility] OR;  Service: Urology;  Laterality: Right;     Family History   Problem Relation Age of Onset    Cancer Maternal Aunt      Social History     Tobacco Use    Smoking status: Former Smoker    Smokeless tobacco: Never Used   Substance Use Topics    Alcohol use: Yes     Comment: socially    Drug use: Not Currently     Types: Marijuana, Methamphetamines, Amphetamines, "Crack" cocaine, " Heroin     Review of Systems   Constitutional: Negative for fever.   HENT: Negative for sore throat.    Respiratory: Negative for shortness of breath.    Cardiovascular: Negative for chest pain.   Gastrointestinal: Negative for nausea.   Genitourinary: Negative for dysuria.   Musculoskeletal: Negative for back pain.   Skin: Negative for rash.   Neurological: Negative for weakness.   Hematological: Does not bruise/bleed easily.   Psychiatric/Behavioral: Negative for agitation, confusion, hallucinations, self-injury, sleep disturbance and suicidal ideas. The patient is nervous/anxious.        Physical Exam     Initial Vitals [02/26/22 1409]   BP Pulse Resp Temp SpO2   97/80 (!) 121 18 98.2 °F (36.8 °C) 100 %      MAP       --         Physical Exam    Nursing note and vitals reviewed.  Constitutional: She appears well-developed and well-nourished. She is not diaphoretic. No distress.   HENT:   Head: Normocephalic and atraumatic.   Eyes: Pupils are equal, round, and reactive to light. Right eye exhibits no discharge. Left eye exhibits no discharge.   Neck: No tracheal deviation present. No JVD present.   Cardiovascular: Exam reveals no friction rub.    No murmur heard.  Pulmonary/Chest: No stridor. No respiratory distress. She has no wheezes. She has no rales.   Abdominal: Bowel sounds are normal. She exhibits no distension.   Musculoskeletal:         General: Normal range of motion.     Neurological: She is alert.   Skin: Skin is warm.   Psychiatric:   Patient seems mildly agitated however she can focus and stay on topic, she has expressed her disappointment that she needs to have blood drawn for test I know knee that because they are going to do that when I get there in fact the patient indicates that she does not want to stay in the ER does not want any test done does not want to wait for psychiatric placement to be made         ED Course   Procedures  Labs Reviewed - No data to display       Imaging Results     None          Medications - No data to display  Medical Decision Making:   ED Management:  The indicates that she does not feel she is in a crisis, she called her mother, and then decided to leave against medical advice                      Clinical Impression:   Final diagnoses:  [R45.1] Agitation (Primary)          ED Disposition Condition    GEO Ca MD  02/27/22 0206

## 2022-02-28 ENCOUNTER — HOSPITAL ENCOUNTER (EMERGENCY)
Facility: HOSPITAL | Age: 37
Discharge: PSYCHIATRIC OR SUBSTANCE USE FACILITY | End: 2022-03-01
Attending: INTERNAL MEDICINE
Payer: MEDICARE

## 2022-02-28 VITALS
OXYGEN SATURATION: 99 % | DIASTOLIC BLOOD PRESSURE: 85 MMHG | RESPIRATION RATE: 18 BRPM | TEMPERATURE: 98 F | SYSTOLIC BLOOD PRESSURE: 141 MMHG | HEIGHT: 66 IN | BODY MASS INDEX: 31.34 KG/M2 | WEIGHT: 195 LBS | HEART RATE: 101 BPM

## 2022-02-28 DIAGNOSIS — Z00.8 MEDICAL CLEARANCE FOR PSYCHIATRIC ADMISSION: ICD-10-CM

## 2022-02-28 DIAGNOSIS — R44.3 HALLUCINATIONS: ICD-10-CM

## 2022-02-28 DIAGNOSIS — R45.851 SUICIDAL IDEATION: Primary | ICD-10-CM

## 2022-02-28 DIAGNOSIS — N39.0 URINARY TRACT INFECTION WITHOUT HEMATURIA, SITE UNSPECIFIED: ICD-10-CM

## 2022-02-28 DIAGNOSIS — F20.0 SCHIZOPHRENIA, PARANOID, CHRONIC WITH ACUTE EXACERBATION: ICD-10-CM

## 2022-02-28 DIAGNOSIS — E87.6 HYPOKALEMIA: ICD-10-CM

## 2022-02-28 LAB
ALBUMIN SERPL BCP-MCNC: 3.9 G/DL (ref 3.5–5.2)
ALP SERPL-CCNC: 54 U/L (ref 55–135)
ALT SERPL W/O P-5'-P-CCNC: 14 U/L (ref 10–44)
AMORPH CRY URNS QL MICRO: ABNORMAL
AMPHET+METHAMPHET UR QL: ABNORMAL
ANION GAP SERPL CALC-SCNC: 14 MMOL/L (ref 8–16)
APAP SERPL-MCNC: <3 UG/ML (ref 10–20)
AST SERPL-CCNC: 16 U/L (ref 10–40)
B-HCG UR QL: NEGATIVE
BACTERIA #/AREA URNS HPF: ABNORMAL /HPF
BARBITURATES UR QL SCN>200 NG/ML: NEGATIVE
BASOPHILS # BLD AUTO: 0.02 K/UL (ref 0–0.2)
BASOPHILS NFR BLD: 0.3 % (ref 0–1.9)
BENZODIAZ UR QL SCN>200 NG/ML: ABNORMAL
BILIRUB SERPL-MCNC: 0.5 MG/DL (ref 0.1–1)
BILIRUB UR QL STRIP: NEGATIVE
BUN SERPL-MCNC: 6 MG/DL (ref 6–20)
BZE UR QL SCN: ABNORMAL
CALCIUM SERPL-MCNC: 9.2 MG/DL (ref 8.7–10.5)
CANNABINOIDS UR QL SCN: NEGATIVE
CHLORIDE SERPL-SCNC: 105 MMOL/L (ref 95–110)
CLARITY UR: ABNORMAL
CO2 SERPL-SCNC: 25 MMOL/L (ref 23–29)
COLOR UR: YELLOW
CREAT SERPL-MCNC: 0.9 MG/DL (ref 0.5–1.4)
CREAT UR-MCNC: 390.9 MG/DL (ref 15–325)
DIFFERENTIAL METHOD: ABNORMAL
EOSINOPHIL # BLD AUTO: 0 K/UL (ref 0–0.5)
EOSINOPHIL NFR BLD: 0.7 % (ref 0–8)
ERYTHROCYTE [DISTWIDTH] IN BLOOD BY AUTOMATED COUNT: 13.3 % (ref 11.5–14.5)
EST. GFR  (AFRICAN AMERICAN): >60 ML/MIN/1.73 M^2
EST. GFR  (NON AFRICAN AMERICAN): >60 ML/MIN/1.73 M^2
ETHANOL SERPL-MCNC: <10 MG/DL (ref 0–10)
GLUCOSE SERPL-MCNC: 80 MG/DL (ref 70–110)
GLUCOSE UR QL STRIP: NEGATIVE
HCT VFR BLD AUTO: 34.8 % (ref 37–48.5)
HGB BLD-MCNC: 11.7 G/DL (ref 12–16)
HGB UR QL STRIP: ABNORMAL
HYALINE CASTS #/AREA URNS LPF: 0 /LPF
IMM GRANULOCYTES # BLD AUTO: 0.01 K/UL (ref 0–0.04)
IMM GRANULOCYTES NFR BLD AUTO: 0.2 % (ref 0–0.5)
KETONES UR QL STRIP: ABNORMAL
LEUKOCYTE ESTERASE UR QL STRIP: ABNORMAL
LYMPHOCYTES # BLD AUTO: 2.7 K/UL (ref 1–4.8)
LYMPHOCYTES NFR BLD: 45.7 % (ref 18–48)
MCH RBC QN AUTO: 33.3 PG (ref 27–31)
MCHC RBC AUTO-ENTMCNC: 33.6 G/DL (ref 32–36)
MCV RBC AUTO: 99 FL (ref 82–98)
METHADONE UR QL SCN>300 NG/ML: NEGATIVE
MICROSCOPIC COMMENT: ABNORMAL
MONOCYTES # BLD AUTO: 0.5 K/UL (ref 0.3–1)
MONOCYTES NFR BLD: 8.6 % (ref 4–15)
NEUTROPHILS # BLD AUTO: 2.6 K/UL (ref 1.8–7.7)
NEUTROPHILS NFR BLD: 44.5 % (ref 38–73)
NITRITE UR QL STRIP: POSITIVE
NRBC BLD-RTO: 0 /100 WBC
OPIATES UR QL SCN: NEGATIVE
PCP UR QL SCN>25 NG/ML: NEGATIVE
PH UR STRIP: 6 [PH] (ref 5–8)
PLATELET # BLD AUTO: 237 K/UL (ref 150–450)
PMV BLD AUTO: 10.8 FL (ref 9.2–12.9)
POTASSIUM SERPL-SCNC: 2.8 MMOL/L (ref 3.5–5.1)
PROT SERPL-MCNC: 7.4 G/DL (ref 6–8.4)
PROT UR QL STRIP: ABNORMAL
RBC # BLD AUTO: 3.51 M/UL (ref 4–5.4)
RBC #/AREA URNS HPF: 55 /HPF (ref 0–4)
SALICYLATES SERPL-MCNC: <5 MG/DL (ref 15–30)
SARS-COV-2 RDRP RESP QL NAA+PROBE: NEGATIVE
SODIUM SERPL-SCNC: 144 MMOL/L (ref 136–145)
SP GR UR STRIP: >=1.03 (ref 1–1.03)
SQUAMOUS #/AREA URNS HPF: 15 /HPF
TOXICOLOGY INFORMATION: ABNORMAL
TSH SERPL DL<=0.005 MIU/L-ACNC: 0.92 UIU/ML (ref 0.4–4)
URN SPEC COLLECT METH UR: ABNORMAL
UROBILINOGEN UR STRIP-ACNC: NEGATIVE EU/DL
WBC # BLD AUTO: 5.84 K/UL (ref 3.9–12.7)
WBC #/AREA URNS HPF: >100 /HPF (ref 0–5)
YEAST URNS QL MICRO: ABNORMAL

## 2022-02-28 PROCEDURE — 93005 ELECTROCARDIOGRAM TRACING: CPT

## 2022-02-28 PROCEDURE — 81000 URINALYSIS NONAUTO W/SCOPE: CPT | Mod: 59 | Performed by: INTERNAL MEDICINE

## 2022-02-28 PROCEDURE — 80048 BASIC METABOLIC PNL TOTAL CA: CPT | Performed by: INTERNAL MEDICINE

## 2022-02-28 PROCEDURE — 82077 ASSAY SPEC XCP UR&BREATH IA: CPT | Performed by: INTERNAL MEDICINE

## 2022-02-28 PROCEDURE — 63600175 PHARM REV CODE 636 W HCPCS: Performed by: INTERNAL MEDICINE

## 2022-02-28 PROCEDURE — 84443 ASSAY THYROID STIM HORMONE: CPT | Performed by: INTERNAL MEDICINE

## 2022-02-28 PROCEDURE — 80143 DRUG ASSAY ACETAMINOPHEN: CPT | Performed by: INTERNAL MEDICINE

## 2022-02-28 PROCEDURE — 93010 EKG 12-LEAD: ICD-10-PCS | Mod: ,,, | Performed by: INTERNAL MEDICINE

## 2022-02-28 PROCEDURE — 81025 URINE PREGNANCY TEST: CPT | Performed by: INTERNAL MEDICINE

## 2022-02-28 PROCEDURE — 25000003 PHARM REV CODE 250: Performed by: INTERNAL MEDICINE

## 2022-02-28 PROCEDURE — 87088 URINE BACTERIA CULTURE: CPT | Performed by: INTERNAL MEDICINE

## 2022-02-28 PROCEDURE — 80179 DRUG ASSAY SALICYLATE: CPT | Performed by: INTERNAL MEDICINE

## 2022-02-28 PROCEDURE — 87086 URINE CULTURE/COLONY COUNT: CPT | Performed by: INTERNAL MEDICINE

## 2022-02-28 PROCEDURE — 85025 COMPLETE CBC W/AUTO DIFF WBC: CPT | Performed by: INTERNAL MEDICINE

## 2022-02-28 PROCEDURE — 99285 EMERGENCY DEPT VISIT HI MDM: CPT | Mod: 25

## 2022-02-28 PROCEDURE — 80307 DRUG TEST PRSMV CHEM ANLYZR: CPT | Performed by: INTERNAL MEDICINE

## 2022-02-28 PROCEDURE — U0002 COVID-19 LAB TEST NON-CDC: HCPCS | Performed by: INTERNAL MEDICINE

## 2022-02-28 PROCEDURE — 93010 ELECTROCARDIOGRAM REPORT: CPT | Mod: ,,, | Performed by: INTERNAL MEDICINE

## 2022-02-28 PROCEDURE — 87186 SC STD MICRODIL/AGAR DIL: CPT | Performed by: INTERNAL MEDICINE

## 2022-02-28 PROCEDURE — 80053 COMPREHEN METABOLIC PANEL: CPT | Performed by: INTERNAL MEDICINE

## 2022-02-28 PROCEDURE — 87077 CULTURE AEROBIC IDENTIFY: CPT | Performed by: INTERNAL MEDICINE

## 2022-02-28 RX ORDER — CEFTRIAXONE 1 G/1
1 INJECTION, POWDER, FOR SOLUTION INTRAMUSCULAR; INTRAVENOUS
Status: COMPLETED | OUTPATIENT
Start: 2022-02-28 | End: 2022-02-28

## 2022-02-28 RX ORDER — POTASSIUM CHLORIDE 20 MEQ/1
40 TABLET, EXTENDED RELEASE ORAL
Status: COMPLETED | OUTPATIENT
Start: 2022-02-28 | End: 2022-02-28

## 2022-02-28 RX ADMIN — CEFTRIAXONE SODIUM 1 G: 1 INJECTION, POWDER, FOR SOLUTION INTRAMUSCULAR; INTRAVENOUS at 10:02

## 2022-02-28 RX ADMIN — POTASSIUM CHLORIDE 40 MEQ: 1500 TABLET, EXTENDED RELEASE ORAL at 10:02

## 2022-03-01 LAB
ANION GAP SERPL CALC-SCNC: 11 MMOL/L (ref 8–16)
BUN SERPL-MCNC: 6 MG/DL (ref 6–20)
CALCIUM SERPL-MCNC: 9.1 MG/DL (ref 8.7–10.5)
CHLORIDE SERPL-SCNC: 106 MMOL/L (ref 95–110)
CO2 SERPL-SCNC: 25 MMOL/L (ref 23–29)
CREAT SERPL-MCNC: 0.8 MG/DL (ref 0.5–1.4)
EST. GFR  (AFRICAN AMERICAN): >60 ML/MIN/1.73 M^2
EST. GFR  (NON AFRICAN AMERICAN): >60 ML/MIN/1.73 M^2
GLUCOSE SERPL-MCNC: 111 MG/DL (ref 70–110)
POTASSIUM SERPL-SCNC: 3 MMOL/L (ref 3.5–5.1)
SODIUM SERPL-SCNC: 142 MMOL/L (ref 136–145)

## 2022-03-01 NOTE — ED PROVIDER NOTES
"Encounter Date: 2022       History     Chief Complaint   Patient presents with    Homicidal    Suicidal     The patient comes in complaining of homicidal and suicidal ideations for the last several days.  The patient has a history of paranoid schizophrenia, bipolar disorder and states has been hearing voices with visual hallucinations as well.  She states she stopped taking her medications several weeks ago.  The patient denies a definite plan or overdose.  The patient was here in 2019 with similar complaints.        Review of patient's allergies indicates:  No Known Allergies  Past Medical History:   Diagnosis Date    Mental disorder     Schizophrenia      Past Surgical History:   Procedure Laterality Date    ABDOMINAL SURGERY       SECTION      CYSTOSCOPY W/ RETROGRADES Right 3/19/2020    Procedure: CYSTOSCOPY, WITH RETROGRADE PYELOGRAM;  Surgeon: Shelley Mai Jr., MD;  Location: [facility] OR;  Service: Urology;  Laterality: Right;    CYSTOSCOPY W/ URETERAL STENT PLACEMENT Right 3/19/2020    Procedure: CYSTOSCOPY, WITH URETERAL STENT INSERTION;  Surgeon: Shelley Mai Jr., MD;  Location: [facility] OR;  Service: Urology;  Laterality: Right;    LASER LITHOTRIPSY Right 3/19/2020    Procedure: LITHOTRIPSY, USING LASER;  Surgeon: Shelley Mai Jr., MD;  Location: [facility] OR;  Service: Urology;  Laterality: Right;    TUBAL LIGATION      URETEROSCOPIC REMOVAL OF URETERIC CALCULUS Right 3/19/2020    Procedure: REMOVAL, CALCULUS, URETER, URETEROSCOPIC;  Surgeon: Shelley Mai Jr., MD;  Location: [facility] OR;  Service: Urology;  Laterality: Right;     Family History   Problem Relation Age of Onset    Cancer Maternal Aunt      Social History     Tobacco Use    Smoking status: Former Smoker    Smokeless tobacco: Never Used   Substance Use Topics    Alcohol use: Yes     Comment: socially    Drug use: Not Currently     Types: Marijuana, Methamphetamines, Amphetamines, "Crack" cocaine, Heroin     Review of " Systems   Constitutional: Negative for fever.   HENT: Negative for sore throat.    Respiratory: Negative for shortness of breath.    Cardiovascular: Negative for chest pain.   Gastrointestinal: Negative for nausea.   Genitourinary: Negative for dysuria.   Musculoskeletal: Negative for back pain.   Skin: Negative for rash.   Neurological: Negative for weakness.   Hematological: Does not bruise/bleed easily.       Physical Exam     Initial Vitals [02/28/22 2022]   BP Pulse Resp Temp SpO2   (!) 141/85 101 18 98.4 °F (36.9 °C) 99 %      MAP       --         Physical Exam    Vitals reviewed.  Constitutional: She appears well-developed.   Eyes: Pupils are equal, round, and reactive to light.   Neck:   Normal range of motion.  Cardiovascular: Normal rate.   Pulmonary/Chest: Breath sounds normal.   Abdominal: Abdomen is soft.   Musculoskeletal:         General: Normal range of motion.      Cervical back: Normal range of motion.     Neurological: She is alert. She has normal strength and normal reflexes. No cranial nerve deficit. GCS eye subscore is 4. GCS verbal subscore is 5. GCS motor subscore is 6.   Skin: Skin is warm.   Psychiatric: Her speech is normal. Her mood appears anxious. She is aggressive. Thought content is paranoid and delusional. She expresses suicidal ideation.         ED Course   Procedures  Labs Reviewed   CBC W/ AUTO DIFFERENTIAL - Abnormal; Notable for the following components:       Result Value    RBC 3.51 (*)     Hemoglobin 11.7 (*)     Hematocrit 34.8 (*)     MCV 99 (*)     MCH 33.3 (*)     All other components within normal limits   COMPREHENSIVE METABOLIC PANEL - Abnormal; Notable for the following components:    Potassium 2.8 (*)     Alkaline Phosphatase 54 (*)     All other components within normal limits   URINALYSIS, REFLEX TO URINE CULTURE - Abnormal; Notable for the following components:    Appearance, UA Hazy (*)     Specific Gravity, UA >=1.030 (*)     Protein, UA 2+ (*)     Ketones, UA  2+ (*)     Occult Blood UA 1+ (*)     Nitrite, UA Positive (*)     Leukocytes, UA 1+ (*)     All other components within normal limits    Narrative:     Preferred Collection Type->Urine, Clean Catch  Specimen Source->Urine   DRUG SCREEN PANEL, URINE EMERGENCY - Abnormal; Notable for the following components:    Benzodiazepines Presumptive Positive (*)     Cocaine (Metab.) Presumptive Positive (*)     Amphetamine Screen, Ur Presumptive Positive (*)     Creatinine, Urine 390.9 (*)     All other components within normal limits    Narrative:     Preferred Collection Type->Urine, Clean Catch  Specimen Source->Urine   ACETAMINOPHEN LEVEL - Abnormal; Notable for the following components:    Acetaminophen (Tylenol), Serum <3.0 (*)     All other components within normal limits   SALICYLATE LEVEL - Abnormal; Notable for the following components:    Salicylate Lvl <5.0 (*)     All other components within normal limits   URINALYSIS MICROSCOPIC - Abnormal; Notable for the following components:    RBC, UA 55 (*)     WBC, UA >100 (*)     Bacteria Many (*)     Yeast, UA Moderate (*)     All other components within normal limits    Narrative:     Preferred Collection Type->Urine, Clean Catch  Specimen Source->Urine   BASIC METABOLIC PANEL - Abnormal; Notable for the following components:    Potassium 3.0 (*)     Glucose 111 (*)     All other components within normal limits   CULTURE, URINE   TSH   ALCOHOL,MEDICAL (ETHANOL)   SARS-COV-2 RNA AMPLIFICATION, QUAL    Narrative:     Is the patient symptomatic?->No   PREGNANCY TEST, URINE RAPID    Narrative:     Specimen Source->Urine        ECG Results          EKG 12-lead (Preliminary result)  Result time 02/28/22 22:10:33    ED Interpretation by Gerson Cooper MD (02/28/22 22:10:33, [facility] Emergency Dept, Emergency Medicine)    Normal sinus rhythm with a rate of 96 and no acute ischemic change                            Imaging Results    None          Medications   potassium  chloride SA CR tablet 40 mEq (40 mEq Oral Given 2/28/22 2230)   cefTRIAXone injection 1 g (1 g Intramuscular Given 2/28/22 2231)                 ED Course as of 03/01/22 0026   Mon Feb 28, 2022 2057 CBC auto differential(!) [PW]   2059 EKG 12-lead [PW]   2108 SARS-CoV-2 RNA, Amplification, Qual: Negative [PW]   2143 TSH: 0.921 [PW]   2143 Salicylate Lvl(!): <5.0 [PW]   2143 Alcohol, Serum: <10 [PW]   2143 Acetaminophen (Tylenol), Serum(!): <3.0 [PW]   2143 Comprehensive metabolic panel(!) [PW]   2210 Preg Test, Ur: Negative [PW]   2210 EKG 12-lead [PW]   2210 Potassium(!): 2.8 [PW]   2211 Urinalysis, Reflex to Urine Culture Urine, Clean Catch(!) [PW]   2211 NITRITE UA(!): Positive [PW]   2211 Leukocytes, UA(!): 1+ [PW]   2211 WBC, UA(!): >100 [PW]   2211 RBC, UA(!): 55 [PW]   2211 Urinalysis Microscopic(!) [PW]   2212 Alcohol, Serum: <10 [PW]   2213 Drug screen panel, emergency(!) [PW]   2213 Benzodiazepines(!): Presumptive Positive [PW]   2213 Cocaine (Metab.)(!): Presumptive Positive [PW]   2213 Amphetamine Screen, Ur(!): Presumptive Positive [PW]   Tue Mar 01, 2022   0026 Potassium(!): 3.0 [PW]      ED Course User Index  [PW] Gerson Cooper MD             Clinical Impression:   Final diagnoses:  [Z00.8] Medical clearance for psychiatric admission  [R45.851] Suicidal ideation (Primary)  [R44.3] Hallucinations  [F20.0] Schizophrenia, paranoid, chronic with acute exacerbation  [N39.0] Urinary tract infection without hematuria, site unspecified  [E87.6] Hypokalemia          ED Disposition Condition    Transfer to Psych Facility         ED Prescriptions     None        Follow-up Information    None          Gerson Cooper MD  02/28/22 2218       Gerson Cooper MD  02/28/22 2342       Gerson Cooper MD  03/01/22 0027

## 2022-03-01 NOTE — NURSING
Spoke with Jane at [facility]. She advised they would accept the patient as long as potassium level came up to 3.o or greater. Number for report is [phone]  Fax results of potassium to [phone]. MD and RN of patient made aware of conditional approval.

## 2022-03-03 LAB — BACTERIA UR CULT: ABNORMAL

## 2022-03-07 NOTE — CARE UPDATE
Urine culture is positive.  Patient did receive 1 dose of Rocephin but was sent to [facility] in [place] without prescription for UTI.  I did speak to the RN Saida.  I have sent over the urine culture and she will give to NP for further management.

## 2022-09-20 ENCOUNTER — HOSPITAL ENCOUNTER (EMERGENCY)
Facility: HOSPITAL | Age: 37
Discharge: LEFT BEFORE DISCHARGE | End: 2022-09-20
Payer: MEDICARE

## 2022-09-20 VITALS
TEMPERATURE: 99 F | WEIGHT: 192 LBS | DIASTOLIC BLOOD PRESSURE: 57 MMHG | SYSTOLIC BLOOD PRESSURE: 102 MMHG | RESPIRATION RATE: 18 BRPM | OXYGEN SATURATION: 100 % | HEART RATE: 80 BPM | BODY MASS INDEX: 30.99 KG/M2

## 2022-09-20 LAB
B-HCG UR QL: NEGATIVE
BACTERIA #/AREA URNS HPF: ABNORMAL /HPF
BILIRUB UR QL STRIP: NEGATIVE
CLARITY UR: ABNORMAL
COLOR UR: YELLOW
GLUCOSE UR QL STRIP: NEGATIVE
HGB UR QL STRIP: ABNORMAL
KETONES UR QL STRIP: NEGATIVE
LEUKOCYTE ESTERASE UR QL STRIP: ABNORMAL
MICROSCOPIC COMMENT: ABNORMAL
NITRITE UR QL STRIP: POSITIVE
PH UR STRIP: 6 [PH] (ref 5–8)
PROT UR QL STRIP: ABNORMAL
RBC #/AREA URNS HPF: 0 /HPF (ref 0–4)
SP GR UR STRIP: 1.02 (ref 1–1.03)
SQUAMOUS #/AREA URNS HPF: 2 /HPF
URN SPEC COLLECT METH UR: ABNORMAL
UROBILINOGEN UR STRIP-ACNC: NEGATIVE EU/DL
WBC #/AREA URNS HPF: 53 /HPF (ref 0–5)

## 2022-09-20 PROCEDURE — 87186 SC STD MICRODIL/AGAR DIL: CPT | Performed by: NURSE PRACTITIONER

## 2022-09-20 PROCEDURE — 87077 CULTURE AEROBIC IDENTIFY: CPT | Performed by: NURSE PRACTITIONER

## 2022-09-20 PROCEDURE — 87086 URINE CULTURE/COLONY COUNT: CPT | Performed by: NURSE PRACTITIONER

## 2022-09-20 PROCEDURE — 99283 EMERGENCY DEPT VISIT LOW MDM: CPT | Mod: 25

## 2022-09-20 PROCEDURE — 81025 URINE PREGNANCY TEST: CPT | Performed by: EMERGENCY MEDICINE

## 2022-09-20 PROCEDURE — 87088 URINE BACTERIA CULTURE: CPT | Performed by: NURSE PRACTITIONER

## 2022-09-20 PROCEDURE — 81000 URINALYSIS NONAUTO W/SCOPE: CPT | Performed by: NURSE PRACTITIONER

## 2022-09-21 NOTE — FIRST PROVIDER EVALUATION
" Emergency Department TeleTriage Encounter Note      CHIEF COMPLAINT    Chief Complaint   Patient presents with    Dysuria     Hx of chronic, recurring bladder infections; presents today with worsening pain & dysuria       VITAL SIGNS   Initial Vitals [09/20/22 2134]   BP Pulse Resp Temp SpO2   (!) 102/57 80 18 98.6 °F (37 °C) 100 %      MAP       --            ALLERGIES    Review of patient's allergies indicates:  No Known Allergies    PROVIDER TRIAGE NOTE  This is a teletriage evaluation of a 37 y.o. female presenting to the ED with c/o "recurrent bladder infection"  .     ROS: (-) fever, (-) rash  PE:  NAD    Initial orders will be placed and care will be transferred to an alternate provider when patient is roomed for a full evaluation. Any additional orders and the final disposition will be determined by that provider.         ORDERS  Labs Reviewed   URINALYSIS, REFLEX TO URINE CULTURE   POCT URINE PREGNANCY       ED Orders (720h ago, onward)      Start Ordered     Status Ordering Provider    09/20/22 2137 09/20/22 2136  Urinalysis, Reflex to Urine Culture Urine, Clean Catch  STAT         Ordered ALISSON ADAM    09/20/22 2137 09/20/22 2136  POCT urine pregnancy  Once         Ordered ALISSON ADAM              Virtual Visit Note: The provider triage portion of this emergency department evaluation and documentation was performed via [employer], a HIPAA-compliant telemedicine application, in concert with a tele-presenter in the room. A face to face patient evaluation with one of my colleagues will occur once the patient is placed in an emergency department room.      DISCLAIMER: This note was prepared with [employer] voice recognition transcription software. Garbled syntax, mangled pronouns, and other bizarre constructions may be attributed to that software system.    "

## 2022-09-23 LAB — BACTERIA UR CULT: ABNORMAL

## 2022-09-28 ENCOUNTER — PATIENT MESSAGE (OUTPATIENT)
Dept: PRIMARY CARE CLINIC | Facility: CLINIC | Age: 37
End: 2022-09-28
Payer: MEDICARE

## 2023-02-25 ENCOUNTER — HOSPITAL ENCOUNTER (EMERGENCY)
Facility: HOSPITAL | Age: 38
Discharge: PSYCHIATRIC OR SUBSTANCE USE FACILITY | End: 2023-02-25
Attending: FAMILY MEDICINE
Payer: MEDICARE

## 2023-02-25 VITALS
OXYGEN SATURATION: 99 % | HEIGHT: 66 IN | TEMPERATURE: 98 F | BODY MASS INDEX: 32.14 KG/M2 | RESPIRATION RATE: 20 BRPM | SYSTOLIC BLOOD PRESSURE: 129 MMHG | WEIGHT: 200 LBS | DIASTOLIC BLOOD PRESSURE: 79 MMHG | HEART RATE: 79 BPM

## 2023-02-25 DIAGNOSIS — F22 PARANOIA: ICD-10-CM

## 2023-02-25 DIAGNOSIS — F20.9 SCHIZOPHRENIA, UNSPECIFIED TYPE: ICD-10-CM

## 2023-02-25 DIAGNOSIS — R45.851 SUICIDAL IDEATION: Primary | ICD-10-CM

## 2023-02-25 LAB
ALBUMIN SERPL BCP-MCNC: 4.1 G/DL (ref 3.5–5.2)
ALP SERPL-CCNC: 61 U/L (ref 55–135)
ALT SERPL W/O P-5'-P-CCNC: 11 U/L (ref 10–44)
AMPHET+METHAMPHET UR QL: NEGATIVE
ANION GAP SERPL CALC-SCNC: 9 MMOL/L (ref 8–16)
APAP SERPL-MCNC: <3 UG/ML (ref 10–20)
AST SERPL-CCNC: 12 U/L (ref 10–40)
B-HCG UR QL: NEGATIVE
BARBITURATES UR QL SCN>200 NG/ML: NEGATIVE
BASOPHILS # BLD AUTO: 0.04 K/UL (ref 0–0.2)
BASOPHILS NFR BLD: 0.8 % (ref 0–1.9)
BENZODIAZ UR QL SCN>200 NG/ML: NEGATIVE
BILIRUB SERPL-MCNC: 0.3 MG/DL (ref 0.1–1)
BILIRUB UR QL STRIP: NEGATIVE
BILIRUB UR QL STRIP: NEGATIVE
BUN SERPL-MCNC: 8 MG/DL (ref 6–20)
BZE UR QL SCN: NEGATIVE
CALCIUM SERPL-MCNC: 9.3 MG/DL (ref 8.7–10.5)
CANNABINOIDS UR QL SCN: NEGATIVE
CHLORIDE SERPL-SCNC: 106 MMOL/L (ref 95–110)
CLARITY UR: CLEAR
CLARITY UR: CLEAR
CO2 SERPL-SCNC: 22 MMOL/L (ref 23–29)
COLOR UR: YELLOW
COLOR UR: YELLOW
CREAT SERPL-MCNC: 0.7 MG/DL (ref 0.5–1.4)
CREAT UR-MCNC: 187.2 MG/DL (ref 15–325)
DIFFERENTIAL METHOD: ABNORMAL
EOSINOPHIL # BLD AUTO: 0.1 K/UL (ref 0–0.5)
EOSINOPHIL NFR BLD: 1.3 % (ref 0–8)
ERYTHROCYTE [DISTWIDTH] IN BLOOD BY AUTOMATED COUNT: 12.9 % (ref 11.5–14.5)
EST. GFR  (NO RACE VARIABLE): >60 ML/MIN/1.73 M^2
ETHANOL SERPL-MCNC: <10 MG/DL (ref 0–10)
GLUCOSE SERPL-MCNC: 96 MG/DL (ref 70–110)
GLUCOSE UR QL STRIP: NEGATIVE
GLUCOSE UR QL STRIP: NEGATIVE
HCT VFR BLD AUTO: 34.5 % (ref 37–48.5)
HCV AB SERPL QL IA: NORMAL
HGB BLD-MCNC: 12 G/DL (ref 12–16)
HGB UR QL STRIP: NEGATIVE
HGB UR QL STRIP: NEGATIVE
HIV 1+2 AB+HIV1 P24 AG SERPL QL IA: NORMAL
IMM GRANULOCYTES # BLD AUTO: 0.01 K/UL (ref 0–0.04)
IMM GRANULOCYTES NFR BLD AUTO: 0.2 % (ref 0–0.5)
KETONES UR QL STRIP: NEGATIVE
KETONES UR QL STRIP: NEGATIVE
LEUKOCYTE ESTERASE UR QL STRIP: NEGATIVE
LEUKOCYTE ESTERASE UR QL STRIP: NEGATIVE
LYMPHOCYTES # BLD AUTO: 2.6 K/UL (ref 1–4.8)
LYMPHOCYTES NFR BLD: 49.6 % (ref 18–48)
MCH RBC QN AUTO: 33.8 PG (ref 27–31)
MCHC RBC AUTO-ENTMCNC: 34.8 G/DL (ref 32–36)
MCV RBC AUTO: 97 FL (ref 82–98)
METHADONE UR QL SCN>300 NG/ML: NEGATIVE
MONOCYTES # BLD AUTO: 0.5 K/UL (ref 0.3–1)
MONOCYTES NFR BLD: 8.7 % (ref 4–15)
NEUTROPHILS # BLD AUTO: 2.1 K/UL (ref 1.8–7.7)
NEUTROPHILS NFR BLD: 39.4 % (ref 38–73)
NITRITE UR QL STRIP: NEGATIVE
NITRITE UR QL STRIP: NEGATIVE
NRBC BLD-RTO: 0 /100 WBC
OPIATES UR QL SCN: NEGATIVE
PCP UR QL SCN>25 NG/ML: NEGATIVE
PH UR STRIP: 7 [PH] (ref 5–8)
PH UR STRIP: 7 [PH] (ref 5–8)
PLATELET # BLD AUTO: 258 K/UL (ref 150–450)
PMV BLD AUTO: 10.1 FL (ref 9.2–12.9)
POTASSIUM SERPL-SCNC: 3.7 MMOL/L (ref 3.5–5.1)
PROT SERPL-MCNC: 7.8 G/DL (ref 6–8.4)
PROT UR QL STRIP: NEGATIVE
PROT UR QL STRIP: NEGATIVE
RBC # BLD AUTO: 3.55 M/UL (ref 4–5.4)
SALICYLATES SERPL-MCNC: <5 MG/DL (ref 15–30)
SARS-COV-2 RDRP RESP QL NAA+PROBE: NEGATIVE
SODIUM SERPL-SCNC: 137 MMOL/L (ref 136–145)
SP GR UR STRIP: 1.02 (ref 1–1.03)
SP GR UR STRIP: 1.02 (ref 1–1.03)
TOXICOLOGY INFORMATION: NORMAL
TSH SERPL DL<=0.005 MIU/L-ACNC: 0.85 UIU/ML (ref 0.4–4)
URN SPEC COLLECT METH UR: NORMAL
URN SPEC COLLECT METH UR: NORMAL
UROBILINOGEN UR STRIP-ACNC: 1 EU/DL
UROBILINOGEN UR STRIP-ACNC: 1 EU/DL
WBC # BLD AUTO: 5.28 K/UL (ref 3.9–12.7)

## 2023-02-25 PROCEDURE — 80307 DRUG TEST PRSMV CHEM ANLYZR: CPT | Performed by: FAMILY MEDICINE

## 2023-02-25 PROCEDURE — 80053 COMPREHEN METABOLIC PANEL: CPT | Performed by: FAMILY MEDICINE

## 2023-02-25 PROCEDURE — 86803 HEPATITIS C AB TEST: CPT | Performed by: FAMILY MEDICINE

## 2023-02-25 PROCEDURE — 82077 ASSAY SPEC XCP UR&BREATH IA: CPT | Performed by: FAMILY MEDICINE

## 2023-02-25 PROCEDURE — 84443 ASSAY THYROID STIM HORMONE: CPT | Performed by: FAMILY MEDICINE

## 2023-02-25 PROCEDURE — 99285 EMERGENCY DEPT VISIT HI MDM: CPT

## 2023-02-25 PROCEDURE — U0002 COVID-19 LAB TEST NON-CDC: HCPCS | Performed by: FAMILY MEDICINE

## 2023-02-25 PROCEDURE — 80179 DRUG ASSAY SALICYLATE: CPT | Performed by: FAMILY MEDICINE

## 2023-02-25 PROCEDURE — 87389 HIV-1 AG W/HIV-1&-2 AB AG IA: CPT | Performed by: FAMILY MEDICINE

## 2023-02-25 PROCEDURE — 85025 COMPLETE CBC W/AUTO DIFF WBC: CPT | Performed by: FAMILY MEDICINE

## 2023-02-25 PROCEDURE — 81025 URINE PREGNANCY TEST: CPT | Performed by: FAMILY MEDICINE

## 2023-02-25 PROCEDURE — 81003 URINALYSIS AUTO W/O SCOPE: CPT | Mod: 59 | Performed by: FAMILY MEDICINE

## 2023-02-25 PROCEDURE — 80143 DRUG ASSAY ACETAMINOPHEN: CPT | Performed by: FAMILY MEDICINE

## 2023-02-25 RX ORDER — TEMAZEPAM 15 MG/1
15 CAPSULE ORAL NIGHTLY
COMMUNITY
Start: 2022-12-31

## 2023-02-25 RX ORDER — TRAZODONE HYDROCHLORIDE 100 MG/1
100 TABLET ORAL NIGHTLY PRN
COMMUNITY
Start: 2023-02-20

## 2023-02-25 RX ORDER — PAROXETINE HYDROCHLORIDE 40 MG/1
40 TABLET, FILM COATED ORAL
COMMUNITY
Start: 2022-11-17

## 2023-02-25 RX ORDER — OLANZAPINE 20 MG/1
20 TABLET ORAL
COMMUNITY
Start: 2022-10-26

## 2023-02-25 RX ORDER — BUPROPION HYDROCHLORIDE 150 MG/1
150 TABLET ORAL
COMMUNITY
Start: 2022-12-01

## 2023-02-25 NOTE — ED PROVIDER NOTES
"Encounter Date: 2023       History     Chief Complaint   Patient presents with    Suicidal     Pt reports being off her medicine for over a month and reports worsening depression with suicidal thoughts. Pt reports wanting to shoot herself but has no access of weapons. Pt reports taking approx 10 trazadone pills yesterday in attempt to go to sleep and not wake up.      37-year-old female presents the ED ambulatory brought here by her mother she states that she feels depressed and suicidal in fact yesterday she states she took 10  50 mg trazodone pills an attempt to harm herself,  this was done at 3:00 a.m. on 2023, patient lives in nearby [place] town of [place], [place] and has recently had problems with her relationship with her mother and her boyfriend, mother apparently had a legal restraining order placed on the patient due to concern for the safety of her children "they will not let me go pick them up at school", apparently her relationship with her boyfriend was severed this morning, the patient states she has a past history of schizophrenia and generalized anxiety disorder "I took a lot of Seroquel in the past but I am not taking it now", patient has no other specific plans for suicide and denies any homicidal ideation, realizes she needs help and is voluntarily seeking help, patient has no history of alcohol abuse she has a past history of drug abuse but states she is not doing drugs now, patient states that feels that people are out to get her and that they are watching though she has no jacinda auditory or visual hallucinations      Review of patient's allergies indicates:  No Known Allergies  Past Medical History:   Diagnosis Date    Mental disorder     Schizophrenia      Past Surgical History:   Procedure Laterality Date    ABDOMINAL SURGERY       SECTION      CYSTOSCOPY W/ RETROGRADES Right 3/19/2020    Procedure: CYSTOSCOPY, WITH RETROGRADE PYELOGRAM;  Surgeon: Shelley Mai " "MD Felicity;  Location: [facility] OR;  Service: Urology;  Laterality: Right;    CYSTOSCOPY W/ URETERAL STENT PLACEMENT Right 3/19/2020    Procedure: CYSTOSCOPY, WITH URETERAL STENT INSERTION;  Surgeon: Shelley Mai Jr., MD;  Location: [facility] OR;  Service: Urology;  Laterality: Right;    LASER LITHOTRIPSY Right 3/19/2020    Procedure: LITHOTRIPSY, USING LASER;  Surgeon: Shelley Mai Jr., MD;  Location: [facility] OR;  Service: Urology;  Laterality: Right;    TUBAL LIGATION      URETEROSCOPIC REMOVAL OF URETERIC CALCULUS Right 3/19/2020    Procedure: REMOVAL, CALCULUS, URETER, URETEROSCOPIC;  Surgeon: Shelley Mai Jr., MD;  Location: [facility] OR;  Service: Urology;  Laterality: Right;     Family History   Problem Relation Age of Onset    Cancer Maternal Aunt      Social History     Tobacco Use    Smoking status: Former    Smokeless tobacco: Never   Substance Use Topics    Alcohol use: Yes     Comment: socially    Drug use: Not Currently     Types: Marijuana, Methamphetamines, Amphetamines, "Crack" cocaine, Heroin     Review of Systems   Constitutional:  Negative for fever.   HENT:  Negative for sore throat.    Eyes:  Negative for pain and itching.   Respiratory:  Negative for shortness of breath.    Cardiovascular:  Negative for chest pain.   Gastrointestinal:  Negative for nausea.   Genitourinary:  Negative for dysuria.   Musculoskeletal:  Negative for back pain.   Skin:  Negative for rash.   Neurological:  Negative for weakness.   Hematological:  Does not bruise/bleed easily.     Physical Exam     Initial Vitals [02/25/23 1718]   BP Pulse Resp Temp SpO2   (!) 132/92 90 18 97.9 °F (36.6 °C) 99 %      MAP       --         Physical Exam    Nursing note and vitals reviewed.  Constitutional: She appears well-developed and well-nourished. She is not diaphoretic. No distress.   HENT:   Head: Normocephalic and atraumatic.   Eyes: Pupils are equal, round, and reactive to light. Right eye exhibits no discharge. Left eye exhibits no " discharge.   Neck: No tracheal deviation present. No JVD present.   Cardiovascular:      Exam reveals no friction rub.       No murmur heard.  Pulmonary/Chest: No stridor. No respiratory distress. She has no wheezes. She has no rales.   Abdominal: Bowel sounds are normal. She exhibits no distension.   Musculoskeletal:         General: Normal range of motion.     Neurological: She is alert.   Skin: Skin is warm.   Psychiatric: She has a normal mood and affect.       ED Course   Procedures  Labs Reviewed   CBC W/ AUTO DIFFERENTIAL - Abnormal; Notable for the following components:       Result Value    RBC 3.55 (*)     Hematocrit 34.5 (*)     MCH 33.8 (*)     Lymph % 49.6 (*)     All other components within normal limits    Narrative:     Release to patient->Immediate   COMPREHENSIVE METABOLIC PANEL - Abnormal; Notable for the following components:    CO2 22 (*)     All other components within normal limits    Narrative:     Release to patient->Immediate   ACETAMINOPHEN LEVEL - Abnormal; Notable for the following components:    Acetaminophen (Tylenol), Serum <3.0 (*)     All other components within normal limits    Narrative:     Release to patient->Immediate   SALICYLATE LEVEL - Abnormal; Notable for the following components:    Salicylate Lvl <5.0 (*)     All other components within normal limits    Narrative:     Release to patient->Immediate   TSH    Narrative:     Release to patient->Immediate   URINALYSIS, REFLEX TO URINE CULTURE    Narrative:     Preferred Collection Type->Urine, Clean Catch  Specimen Source->Urine   DRUG SCREEN PANEL, URINE EMERGENCY    Narrative:     Preferred Collection Type->Urine, Clean Catch  Specimen Source->Urine   ALCOHOL,MEDICAL (ETHANOL)    Narrative:     Release to patient->Immediate   SARS-COV-2 RNA AMPLIFICATION, QUAL    Narrative:     Is the patient symptomatic?->No   PREGNANCY TEST, URINE RAPID    Narrative:     Specimen Source->Urine   URINALYSIS, REFLEX TO URINE CULTURE     Narrative:     Preferred Collection Type->Urine, Clean Catch  Specimen Source->Urine   HIV 1 / 2 ANTIBODY   HEPATITIS C ANTIBODY          Imaging Results    None     I have discussed case with incoming ED physician Dr. Meza who will finish care and disposition of patient.      Medications - No data to display  Medical Decision Making:   Clinical Tests:   Lab Tests: Reviewed  ED Management:  Pt presented with depression and SI. Seen an evaluated by Dr Ca and signed out to me at shift change for dispo/transfer. Psych workup negative. Normal exam. Pt medically cleared for psych admission.           ED Course as of 02/25/23 1822   Sat Feb 25, 2023 1738 Signed out to me by Dr SANTANA. Pt here with SI and voluntary psych admission. Plan medically clear and transfer. [DC]      ED Course User Index  [DC] Gerhard Meza Jr., MD       Medically cleared for psychiatry placement: 2/25/2023  6:22 PM         Clinical Impression:   Final diagnoses:  [R45.851] Suicidal ideation (Primary)  [F20.9] Schizophrenia, unspecified type  [F22] Paranoia        ED Disposition Condition    Transfer to Psych Facility Stable          ED Prescriptions    None       Follow-up Information    None          Gerhard Meza Jr., MD  02/25/23 1822

## 2023-02-26 NOTE — ED NOTES
MTM with Medicaid states pt does not have transport services for her insurance, called and notified Armando at [facility].

## 2023-05-08 ENCOUNTER — HOSPITAL ENCOUNTER (EMERGENCY)
Facility: HOSPITAL | Age: 38
Discharge: HOME | End: 2023-05-08
Attending: EMERGENCY MEDICINE
Payer: MEDICARE

## 2023-05-08 VITALS
TEMPERATURE: 99 F | SYSTOLIC BLOOD PRESSURE: 110 MMHG | BODY MASS INDEX: 31.98 KG/M2 | DIASTOLIC BLOOD PRESSURE: 78 MMHG | WEIGHT: 199 LBS | HEIGHT: 66 IN | RESPIRATION RATE: 16 BRPM | HEART RATE: 88 BPM | OXYGEN SATURATION: 98 %

## 2023-05-08 DIAGNOSIS — N39.0 URINARY TRACT INFECTION WITH HEMATURIA, SITE UNSPECIFIED: Primary | ICD-10-CM

## 2023-05-08 DIAGNOSIS — R31.9 URINARY TRACT INFECTION WITH HEMATURIA, SITE UNSPECIFIED: Primary | ICD-10-CM

## 2023-05-08 LAB
ALBUMIN SERPL BCP-MCNC: 3.9 G/DL (ref 3.5–5.2)
ALP SERPL-CCNC: 66 U/L (ref 55–135)
ALT SERPL W/O P-5'-P-CCNC: 14 U/L (ref 10–44)
ANION GAP SERPL CALC-SCNC: 14 MMOL/L (ref 8–16)
AST SERPL-CCNC: 15 U/L (ref 10–40)
B-HCG UR QL: NEGATIVE
BACTERIA #/AREA URNS HPF: ABNORMAL /HPF
BASOPHILS # BLD AUTO: 0.03 K/UL (ref 0–0.2)
BASOPHILS NFR BLD: 0.3 % (ref 0–1.9)
BILIRUB SERPL-MCNC: 0.3 MG/DL (ref 0.1–1)
BILIRUB UR QL STRIP: NEGATIVE
BUN SERPL-MCNC: 7 MG/DL (ref 6–20)
CALCIUM SERPL-MCNC: 9.5 MG/DL (ref 8.7–10.5)
CHLORIDE SERPL-SCNC: 101 MMOL/L (ref 95–110)
CLARITY UR: ABNORMAL
CO2 SERPL-SCNC: 23 MMOL/L (ref 23–29)
COLOR UR: ABNORMAL
CREAT SERPL-MCNC: 0.8 MG/DL (ref 0.5–1.4)
DIFFERENTIAL METHOD: ABNORMAL
EOSINOPHIL # BLD AUTO: 0 K/UL (ref 0–0.5)
EOSINOPHIL NFR BLD: 0.1 % (ref 0–8)
ERYTHROCYTE [DISTWIDTH] IN BLOOD BY AUTOMATED COUNT: 12.5 % (ref 11.5–14.5)
EST. GFR  (NO RACE VARIABLE): >60 ML/MIN/1.73 M^2
GLUCOSE SERPL-MCNC: 91 MG/DL (ref 70–110)
GLUCOSE UR QL STRIP: NEGATIVE
HCT VFR BLD AUTO: 34.8 % (ref 37–48.5)
HGB BLD-MCNC: 11.6 G/DL (ref 12–16)
HGB UR QL STRIP: ABNORMAL
HYALINE CASTS #/AREA URNS LPF: 0 /LPF
IMM GRANULOCYTES # BLD AUTO: 0.04 K/UL (ref 0–0.04)
IMM GRANULOCYTES NFR BLD AUTO: 0.4 % (ref 0–0.5)
KETONES UR QL STRIP: NEGATIVE
LEUKOCYTE ESTERASE UR QL STRIP: ABNORMAL
LYMPHOCYTES # BLD AUTO: 1.9 K/UL (ref 1–4.8)
LYMPHOCYTES NFR BLD: 17.9 % (ref 18–48)
MCH RBC QN AUTO: 33.3 PG (ref 27–31)
MCHC RBC AUTO-ENTMCNC: 33.3 G/DL (ref 32–36)
MCV RBC AUTO: 100 FL (ref 82–98)
MICROSCOPIC COMMENT: ABNORMAL
MONOCYTES # BLD AUTO: 1 K/UL (ref 0.3–1)
MONOCYTES NFR BLD: 9.5 % (ref 4–15)
NEUTROPHILS # BLD AUTO: 7.6 K/UL (ref 1.8–7.7)
NEUTROPHILS NFR BLD: 71.8 % (ref 38–73)
NITRITE UR QL STRIP: POSITIVE
NRBC BLD-RTO: 0 /100 WBC
PH UR STRIP: 6 [PH] (ref 5–8)
PLATELET # BLD AUTO: 269 K/UL (ref 150–450)
PMV BLD AUTO: 9.8 FL (ref 9.2–12.9)
POTASSIUM SERPL-SCNC: 3.5 MMOL/L (ref 3.5–5.1)
PROT SERPL-MCNC: 8.4 G/DL (ref 6–8.4)
PROT UR QL STRIP: ABNORMAL
RBC # BLD AUTO: 3.48 M/UL (ref 4–5.4)
RBC #/AREA URNS HPF: >100 /HPF (ref 0–4)
SODIUM SERPL-SCNC: 138 MMOL/L (ref 136–145)
SP GR UR STRIP: 1.01 (ref 1–1.03)
URN SPEC COLLECT METH UR: ABNORMAL
UROBILINOGEN UR STRIP-ACNC: NEGATIVE EU/DL
WBC # BLD AUTO: 10.54 K/UL (ref 3.9–12.7)
WBC #/AREA URNS HPF: 40 /HPF (ref 0–5)

## 2023-05-08 PROCEDURE — 36415 COLL VENOUS BLD VENIPUNCTURE: CPT | Performed by: PHYSICIAN ASSISTANT

## 2023-05-08 PROCEDURE — 96375 TX/PRO/DX INJ NEW DRUG ADDON: CPT

## 2023-05-08 PROCEDURE — 81000 URINALYSIS NONAUTO W/SCOPE: CPT | Mod: 59 | Performed by: PHYSICIAN ASSISTANT

## 2023-05-08 PROCEDURE — 63600175 PHARM REV CODE 636 W HCPCS: Performed by: PHYSICIAN ASSISTANT

## 2023-05-08 PROCEDURE — 81025 URINE PREGNANCY TEST: CPT | Performed by: PHYSICIAN ASSISTANT

## 2023-05-08 PROCEDURE — 87186 SC STD MICRODIL/AGAR DIL: CPT | Performed by: PHYSICIAN ASSISTANT

## 2023-05-08 PROCEDURE — 96361 HYDRATE IV INFUSION ADD-ON: CPT

## 2023-05-08 PROCEDURE — 96365 THER/PROPH/DIAG IV INF INIT: CPT

## 2023-05-08 PROCEDURE — 81003 URINALYSIS AUTO W/O SCOPE: CPT | Performed by: PHYSICIAN ASSISTANT

## 2023-05-08 PROCEDURE — 87077 CULTURE AEROBIC IDENTIFY: CPT | Performed by: PHYSICIAN ASSISTANT

## 2023-05-08 PROCEDURE — 80053 COMPREHEN METABOLIC PANEL: CPT | Performed by: PHYSICIAN ASSISTANT

## 2023-05-08 PROCEDURE — 85025 COMPLETE CBC W/AUTO DIFF WBC: CPT | Performed by: PHYSICIAN ASSISTANT

## 2023-05-08 PROCEDURE — 87088 URINE BACTERIA CULTURE: CPT | Performed by: PHYSICIAN ASSISTANT

## 2023-05-08 PROCEDURE — 87086 URINE CULTURE/COLONY COUNT: CPT | Performed by: PHYSICIAN ASSISTANT

## 2023-05-08 PROCEDURE — 25000003 PHARM REV CODE 250: Performed by: PHYSICIAN ASSISTANT

## 2023-05-08 PROCEDURE — 99285 EMERGENCY DEPT VISIT HI MDM: CPT | Mod: 25

## 2023-05-08 RX ORDER — CIPROFLOXACIN 500 MG/1
500 TABLET ORAL EVERY 12 HOURS
Qty: 14 TABLET | Refills: 0 | Status: SHIPPED | OUTPATIENT
Start: 2023-05-08 | End: 2023-05-15

## 2023-05-08 RX ORDER — ONDANSETRON 2 MG/ML
8 INJECTION INTRAMUSCULAR; INTRAVENOUS
Status: COMPLETED | OUTPATIENT
Start: 2023-05-08 | End: 2023-05-08

## 2023-05-08 RX ORDER — ONDANSETRON 8 MG/1
8 TABLET, ORALLY DISINTEGRATING ORAL 3 TIMES DAILY PRN
Qty: 20 TABLET | Refills: 0 | Status: SHIPPED | OUTPATIENT
Start: 2023-05-08

## 2023-05-08 RX ORDER — KETOROLAC TROMETHAMINE 30 MG/ML
15 INJECTION, SOLUTION INTRAMUSCULAR; INTRAVENOUS
Status: COMPLETED | OUTPATIENT
Start: 2023-05-08 | End: 2023-05-08

## 2023-05-08 RX ORDER — IBUPROFEN 600 MG/1
600 TABLET ORAL EVERY 8 HOURS PRN
Qty: 20 TABLET | Refills: 0 | Status: SHIPPED | OUTPATIENT
Start: 2023-05-08

## 2023-05-08 RX ADMIN — ONDANSETRON 8 MG: 2 INJECTION INTRAMUSCULAR; INTRAVENOUS at 02:05

## 2023-05-08 RX ADMIN — KETOROLAC TROMETHAMINE 15 MG: 30 INJECTION, SOLUTION INTRAMUSCULAR; INTRAVENOUS at 02:05

## 2023-05-08 RX ADMIN — SODIUM CHLORIDE 1000 ML: 9 INJECTION, SOLUTION INTRAVENOUS at 02:05

## 2023-05-08 RX ADMIN — CEFTRIAXONE 1 G: 1 INJECTION, POWDER, FOR SOLUTION INTRAMUSCULAR; INTRAVENOUS at 03:05

## 2023-05-08 NOTE — ED PROVIDER NOTES
Encounter Date: 2023    SCRIBE #1 NOTE: Kayode BERTRAND, julieta scribing for, and in the presence of,  Sonia Kaplan PA-C.     History     Chief Complaint   Patient presents with    Dysuria     X 1 week      Time seen by provider: 2:05 PM on 2023    Eleno Kiran is a 38 y.o. female who presents to the ED with an onset of worsening dysuria and hematuria with nausea starting 1 week ago. The patient states she feels back pain when walking. The patient denies taking any medications for her symptoms. The patient states she has had UTI's in the past. The patient denies fever or any other symptoms at this time. PMHx of JULIEN, polysubstance abuse, non-traumatic rhabdomyolysis, UTI, nephrolithiasis, hydroureteronephrosis, and yeast vaginitis. The patient has no pertinent PSHx.    The history is provided by the patient.   Review of patient's allergies indicates:  No Known Allergies  Past Medical History:   Diagnosis Date    Mental disorder     Schizophrenia      Past Surgical History:   Procedure Laterality Date    ABDOMINAL SURGERY       SECTION      CYSTOSCOPY W/ RETROGRADES Right 3/19/2020    Procedure: CYSTOSCOPY, WITH RETROGRADE PYELOGRAM;  Surgeon: Shelley Mai Jr., MD;  Location: [facility] OR;  Service: Urology;  Laterality: Right;    CYSTOSCOPY W/ URETERAL STENT PLACEMENT Right 3/19/2020    Procedure: CYSTOSCOPY, WITH URETERAL STENT INSERTION;  Surgeon: Shelley Mai Jr., MD;  Location: [facility] OR;  Service: Urology;  Laterality: Right;    LASER LITHOTRIPSY Right 3/19/2020    Procedure: LITHOTRIPSY, USING LASER;  Surgeon: Shelley Mai Jr., MD;  Location: [facility] OR;  Service: Urology;  Laterality: Right;    TUBAL LIGATION      URETEROSCOPIC REMOVAL OF URETERIC CALCULUS Right 3/19/2020    Procedure: REMOVAL, CALCULUS, URETER, URETEROSCOPIC;  Surgeon: Shelley Mai Jr., MD;  Location: [facility] OR;  Service: Urology;  Laterality: Right;     Family History   Problem Relation Age of Onset    Cancer  "Maternal Aunt      Social History     Tobacco Use    Smoking status: Former    Smokeless tobacco: Never   Substance Use Topics    Alcohol use: Yes     Comment: socially    Drug use: Not Currently     Types: Marijuana, Methamphetamines, Amphetamines, "Crack" cocaine, Heroin     Review of Systems   Constitutional:  Negative for chills and fever.   Respiratory:  Negative for cough, chest tightness, shortness of breath and wheezing.    Cardiovascular:  Negative for chest pain and palpitations.   Gastrointestinal:  Positive for nausea. Negative for abdominal pain, diarrhea and vomiting.   Genitourinary:  Positive for dysuria and hematuria.   Musculoskeletal:  Positive for back pain (when walking). Negative for arthralgias, joint swelling, myalgias, neck pain and neck stiffness.   Skin:  Negative for color change, pallor, rash and wound.   Neurological:  Negative for dizziness, syncope, weakness, light-headedness, numbness and headaches.   Hematological:  Does not bruise/bleed easily.   Psychiatric/Behavioral:  The patient is not nervous/anxious.    All other systems reviewed and are negative.    Physical Exam     Initial Vitals [05/08/23 1316]   BP Pulse Resp Temp SpO2   (!) 139/91 (!) 114 20 99 °F (37.2 °C) 98 %      MAP       --         Physical Exam    Nursing note and vitals reviewed.  Constitutional: She appears well-developed and well-nourished. She is not diaphoretic. No distress.   HENT:   Head: Normocephalic and atraumatic.   Mouth/Throat: Oropharynx is clear and moist.   Eyes: Conjunctivae are normal.   Neck: Neck supple.   Normal range of motion.  Cardiovascular:  Normal rate, regular rhythm, normal heart sounds and intact distal pulses.     Exam reveals no gallop and no friction rub.       No murmur heard.  Pulmonary/Chest: Breath sounds normal. No respiratory distress. She has no wheezes. She has no rhonchi. She has no rales.   Abdominal: Abdomen is soft. She exhibits no distension and no mass. There is " abdominal tenderness.   TTP noted across lower abdomen.  No CVA tenderness.     Musculoskeletal:         General: No tenderness or edema. Normal range of motion.      Cervical back: Normal range of motion and neck supple.     Neurological: She is alert and oriented to person, place, and time. She has normal strength. No sensory deficit.   Skin: Skin is warm and dry. No rash and no abscess noted. No erythema.   Psychiatric: She has a normal mood and affect.       ED Course   Procedures  Labs Reviewed   URINALYSIS, REFLEX TO URINE CULTURE - Abnormal; Notable for the following components:       Result Value    Color, UA Brown (*)     Appearance, UA Cloudy (*)     Protein, UA 3+ (*)     Occult Blood UA 3+ (*)     Nitrite, UA Positive (*)     Leukocytes, UA 1+ (*)     All other components within normal limits    Narrative:     Specimen Source->Urine   CBC W/ AUTO DIFFERENTIAL - Abnormal; Notable for the following components:    RBC 3.48 (*)     Hemoglobin 11.6 (*)     Hematocrit 34.8 (*)      (*)     MCH 33.3 (*)     Lymph % 17.9 (*)     All other components within normal limits   URINALYSIS MICROSCOPIC - Abnormal; Notable for the following components:    RBC, UA >100 (*)     WBC, UA 40 (*)     Bacteria Few (*)     All other components within normal limits    Narrative:     Specimen Source->Urine   CULTURE, URINE   PREGNANCY TEST, URINE RAPID    Narrative:     Specimen Source->Urine   COMPREHENSIVE METABOLIC PANEL          Imaging Results              CT Renal Stone Study Abd Pelvis WO (Final result)  Result time 05/08/23 15:39:03      Final result by Jeff Spicer MD (05/08/23 15:39:03)                   Impression:      1. No calcified  stones.  2. Bladder wall thickening could relate to inadequate distension or cystitis.  3. Small fat containing ventral hernia.      Electronically signed by: Jeff Spicer  Date:    05/08/2023  Time:    15:39               Narrative:    EXAMINATION:  CT RENAL STONE  STUDY ABD PELVIS WO    CLINICAL HISTORY:  Flank pain, kidney stone suspected;    TECHNIQUE:  Low dose axial images, sagittal and coronal reformations were obtained from the lung bases to the pubic symphysis.  Contrast was not administered.    COMPARISON:  03/18/2020    FINDINGS:  Lung bases clear.  Normal size heart.  Cholecystectomy clips.    No hydroureteronephrosis or calcified nephroureterolithiasis.  Remaining solid abdominal organs are grossly unremarkable on this noncontrast exam.    There is no enteric contrast which limits bowel assessment.  Under distension presumably accounts for proximal gastric wall thickening.  No dilated bowel loops.  Normal appendix.    Unremarkable noncontrast uterus aside from Caesarean section.  Mild diffuse urinary bladder wall thickening.  Small volume of low-density pelvic free fluid.  Small fat containing ventral hernia a few cm below the xiphoid process with fascial defect 1.8 cm.    No acute osseous abnormality.                                       Medications   sodium chloride 0.9% bolus 1,000 mL 1,000 mL (0 mLs Intravenous Stopped 5/8/23 1701)   ketorolac injection 15 mg (15 mg Intravenous Given 5/8/23 1456)   ondansetron injection 8 mg (8 mg Intravenous Given 5/8/23 1456)   cefTRIAXone (ROCEPHIN) 1 g in dextrose 5 % in water (D5W) 5 % 50 mL IVPB (MB+) (0 g Intravenous Stopped 5/8/23 1550)     Medical Decision Making:   History:   Old Medical Records: I decided to obtain old medical records.  Old Records Summarized: records from clinic visits and records from previous admission(s).  Differential Diagnosis:   UTI   Pyelonephritis   Renal Stone   Acute Abdomen   Clinical Tests:   Lab Tests: Ordered and Reviewed  ED Management:  Pt emergently evaluated here in the ED.   UA consistent with infection and urine culture is pending.  Labs stable without leukocytosis.  Normal renal function and normal LFTs.  UPT is negative.  CT renal stone study shows evidence for calcified  stones, but there is evidence for bladder wall thickening /cystitis.  Previous urine cultures have shown sensitivity to Cipro. She is given a dose of IV Rocephin here in the ED and was discharged home with a prescription for Cipro. She voices understanding and is agreeable to the plan.  She is given specific return precautions. No need for further imaging or admission to the hospital.  Low suspicion for pyelonephritis or sepsis.           Scribe Attestation:   Scribe #1: I performed the above scribed service and the documentation accurately describes the services I performed. I attest to the accuracy of the note.      ED Course as of 05/08/23 1759   Mon May 08, 2023   1510 NITRITE UA(!): Positive [HS]   1510 Leukocytes, UA(!): 1+ [HS]   1510 RBC, UA(!): >100 [HS]   1510 WBC, UA(!): 40 [HS]   1510 Bacteria, UA(!): Few [HS]      ED Course User Index  [HS] Sonia Kaplan PA-C               I, Sonia Kaplan PA-C, personally performed the services described in this documentation. All medical record entries made by the scribe were at my direction and in my presence.  I have reviewed the chart and agree that the record reflects my personal performance and is accurate and complete. Sonia Kaplan PA-C.  5:59 PM 05/08/2023      Clinical Impression:   Final diagnoses:  [N39.0, R31.9] Urinary tract infection with hematuria, site unspecified (Primary)        ED Disposition Condition    Discharge Stable          ED Prescriptions       Medication Sig Dispense Start Date End Date Auth. Provider    ciprofloxacin HCl (CIPRO) 500 MG tablet Take 1 tablet (500 mg total) by mouth every 12 (twelve) hours. for 7 days 14 tablet 5/8/2023 5/15/2023 Sonia Kaplan PA-C    ondansetron (ZOFRAN-ODT) 8 MG TbDL Take 1 tablet (8 mg total) by mouth 3 (three) times daily as needed (nausea and vomiting). 20 tablet 5/8/2023 -- Sonia Kaplan PA-C    ibuprofen (ADVIL,MOTRIN) 600 MG tablet Take 1 tablet (600 mg total) by mouth  every 8 (eight) hours as needed for Pain. 20 tablet 5/8/2023 -- Sonia Kaplan PA-C          Follow-up Information       Follow up With Specialties Details Why Contact Info    [facility] Emergency Dept Emergency Medicine  As needed, If symptoms worsen [address] [place] [id number] [phone]             Sonia Kaplan PA-C  05/08/23 6165

## 2023-05-08 NOTE — ED NOTES
PIV to right hand 22 gauge was placed and and fluids started. Noticed redness to site and slight puffiness. Reassessed will start another IV. D/C' d PIV to right hand once the 20 gauge IV was started and assessed for patency to RAC. Tolerated well and no abnormal swelling, bleeding, or bruising noted. Bandage applied.

## 2023-05-08 NOTE — ED NOTES
IV attempted x 3; 22g to right hand infiltrated during fluids.  ED Paramedic to attempt us guided.

## 2023-05-08 NOTE — ED NOTES
Patient is resting in bed. She denies any needs or questions at this time. Lights off for comfort.

## 2023-05-08 NOTE — ED NOTES
Upon discharge, patient is AAOx4, no cardiac or respiratory complications. Follow up care and  Medications have been reviewed with patient and has been instructed to return to the ER if needed. Patient verbalized understanding and ambulated to the lobby without difficulty. GINA ARMSTRONG.

## 2023-05-11 LAB — BACTERIA UR CULT: ABNORMAL

## 2023-07-30 NOTE — PROGRESS NOTES
[facility] Hospital Medicine  Progress Note    Patient Name: Eleno Kiran  MRN: [id number]  Patient Class: IP- Inpatient   Admission Date: 10/17/2019  Length of Stay: 4 days  Attending Physician: Lizette Joe MD  Primary Care Provider: Gerhard Sequeira III, MD        Subjective:     Principal Problem:JULIEN (acute kidney injury)  Acute Condition: JULIEN        HPI:  Eleno Kiran is a 33 y/o F who [place] [facility] [place] [facility] with a diagnosis of acute kidney injury.  Pt is alert, but confused, and reporting both auditory and visual hallucinations that were thought to have started around 0800 Wednesday morning after use of ice, heroin and/or cocaine.  Pt was noted to have metabolic acidosis with an anion gap of 22.  She was placed on a bicarb drip but has failed to produce urine.  She was sent to [facility] for the service of nephrology.  Further history is difficult to obtain as pt remains confused and continues to have hallucinations.    Overview/Hospital Course:  34-year-old female admitted for acute kidney injury after polysubstance abuse, dehydration from gastroenteritis, and rhabdomyolysis.  Renal function returned to normal after IV fluids with bicarb drip and normal saline.  She had no further nausea vomiting or diarrhea.  Her electrolytes were monitored and replaced.  Her CPK level trended down.  She was able to tolerate p.o..    She had hallucinations which were mild and resolved with intermittent treatment with Haldol p.r.n..  Physician emergency certificate was continued from her add emergency room stay and Psychiatry consulted.  She reports history of drug use and prior inpatient psychiatric treatment for depression.    Admitted with JULIEN 2/2 gastroenteritis/polysubstance abuse and PEC -2/2 hallucinations. PEC. Julien resolved with hydration and bicarb drip. And uti treated with metronidazole for trichomonas so pt medically cleared for psychiatric care.    Hallucinations treated with haldol and medication regimen instituted by psychiatry.   CM consulted for in patient psych placement.    Interval History:  The patient is doing well and has no complaints    Review of Systems   Respiratory: Negative for cough, chest tightness, shortness of breath and wheezing.    Cardiovascular: Negative for chest pain, palpitations and leg swelling.   Gastrointestinal: Negative for abdominal distention, abdominal pain, constipation, diarrhea, nausea and vomiting.   Genitourinary: Negative for difficulty urinating, dysuria and flank pain.   Musculoskeletal: Negative for gait problem, joint swelling and neck pain.   Skin: Negative for rash and wound.   Neurological: Negative for dizziness, syncope, light-headedness and headaches.   Psychiatric/Behavioral: Negative for agitation, behavioral problems and confusion.     Objective:     Vital Signs (Most Recent):  Temp: 98.3 °F (36.8 °C) (10/21/19 0815)  Pulse: 93 (10/21/19 0815)  Resp: 20 (10/21/19 0815)  BP: 134/86 (10/21/19 0815)  SpO2: 100 % (10/21/19 0815) Vital Signs (24h Range):  Temp:  [98.2 °F (36.8 °C)-98.3 °F (36.8 °C)] 98.3 °F (36.8 °C)  Pulse:  [82-93] 93  Resp:  [20] 20  SpO2:  [100 %] 100 %  BP: (134-143)/(86-88) 134/86     Weight: 81.2 kg (179 lb 0.2 oz)  Body mass index is 28.89 kg/m².  No intake or output data in the 24 hours ending 10/21/19 1221   Physical Exam   Constitutional: She is oriented to person, place, and time. She appears well-developed and well-nourished.   HENT:   Head: Normocephalic and atraumatic.   Eyes: Pupils are equal, round, and reactive to light. EOM are normal.   Neck: Normal range of motion. Neck supple. No JVD present.   Cardiovascular: Normal rate, regular rhythm, normal heart sounds and intact distal pulses.   Pulmonary/Chest: Effort normal and breath sounds normal. No respiratory distress. She has no rales.   Abdominal: Soft. Bowel sounds are normal. She exhibits no distension. There is no  tenderness. There is no rebound.   Musculoskeletal: Normal range of motion. She exhibits no edema or deformity.   Neurological: She is alert and oriented to person, place, and time. No cranial nerve deficit or sensory deficit.   Skin: Skin is warm and dry. No rash noted.   Psychiatric: She has a normal mood and affect. Her behavior is normal.   Nursing note and vitals reviewed.      Significant Labs:   BMP:   Recent Labs   Lab 10/20/19  0419   GLU 87      K 3.5      CO2 22*   BUN 7   CREATININE 0.7   CALCIUM 8.8   MG 1.7     CBC:   Recent Labs   Lab 10/20/19  0419   WBC 6.11   HGB 10.0*   HCT 29.7*          Significant Imaging: I have reviewed all pertinent imaging results/findings within the past 24 hours.      Assessment/Plan:      * JULIEN (acute kidney injury)  -resolved  -likely 2/2 GE, dehydration, drugs and rhabdo    Adjust renal dose medications for Estimated Creatinine Clearance: 119.4 mL/min (based on SCr of 0.7 mg/dL).  Avoid NSAIDs, Reddy-II inhibitors, ACE-I, Angiotensin Receptor Blockers, or Aminoglycosides.  Urine analysis.-mild proteinuria /wbc/trichomonas --treated std  w flagyl  Urine culture neg                   UTI (urinary tract infection)  This will be treated with Cipro      Trichomonas vaginitis  Acute -noted on ua  Treated with metronidazole       Gastroenteritis  Much better.  Patient is tolerating the diet      Non-traumatic rhabdomyolysis  Resolved      Polysubstance abuse  Hx cannabis/spice abuse with admit hx of Ice, cocaine and heroin.          Hallucination, drug-induced  Resolved.  Secondary to illicit drugs?           The patient is medically stable and can be transferred to inpatient psychiatric facility.    VTE Risk Mitigation (From admission, onward)         Ordered     IP VTE HIGH RISK PATIENT  Once      10/17/19 2054     Place ANNALISE hose  Until discontinued      10/17/19 2054     Place sequential compression device  Until discontinued      10/17/19 2054                     Lizette Joe MD  Department of Hospital Medicine   [facility]   (1) Other Medications/None

## 2023-10-03 ENCOUNTER — HOSPITAL ENCOUNTER (EMERGENCY)
Facility: HOSPITAL | Age: 38
Discharge: HOME | End: 2023-10-03
Attending: STUDENT IN AN ORGANIZED HEALTH CARE EDUCATION/TRAINING PROGRAM
Payer: MEDICARE

## 2023-10-03 VITALS
OXYGEN SATURATION: 98 % | BODY MASS INDEX: 43.07 KG/M2 | WEIGHT: 268 LBS | TEMPERATURE: 100 F | HEART RATE: 103 BPM | RESPIRATION RATE: 20 BRPM | DIASTOLIC BLOOD PRESSURE: 84 MMHG | SYSTOLIC BLOOD PRESSURE: 119 MMHG | HEIGHT: 66 IN

## 2023-10-03 DIAGNOSIS — B34.9 VIRAL SYNDROME: Primary | ICD-10-CM

## 2023-10-03 LAB
GROUP A STREP, MOLECULAR: NEGATIVE
INFLUENZA A, MOLECULAR: NEGATIVE
INFLUENZA B, MOLECULAR: NEGATIVE
SARS-COV-2 RDRP RESP QL NAA+PROBE: NEGATIVE
SPECIMEN SOURCE: NORMAL

## 2023-10-03 PROCEDURE — U0002 COVID-19 LAB TEST NON-CDC: HCPCS | Performed by: NURSE PRACTITIONER

## 2023-10-03 PROCEDURE — 87502 INFLUENZA DNA AMP PROBE: CPT | Performed by: NURSE PRACTITIONER

## 2023-10-03 PROCEDURE — 87651 STREP A DNA AMP PROBE: CPT | Performed by: NURSE PRACTITIONER

## 2023-10-03 PROCEDURE — 99283 EMERGENCY DEPT VISIT LOW MDM: CPT

## 2023-10-03 RX ORDER — PROMETHAZINE HYDROCHLORIDE 25 MG/1
25 SUPPOSITORY RECTAL EVERY 6 HOURS PRN
Qty: 30 SUPPOSITORY | Refills: 1 | Status: SHIPPED | OUTPATIENT
Start: 2023-10-03

## 2023-10-03 NOTE — ED PROVIDER NOTES
"Encounter Date: 10/3/2023       History     Chief Complaint   Patient presents with    Chills     Cold chills. Dry cough. Low grade fever. Onset x 2 days.      POV to the ED alone.  Patient complains of cough congestion with fever and headache loose stools for 2 days.  Concerns for COVID exposure.  No chest pain or shortness a breath.  No abdominal pain.  No vomiting.  Declines work note.  No other complaints    The history is provided by the patient.     Review of patient's allergies indicates:  No Known Allergies  Past Medical History:   Diagnosis Date    Mental disorder     Schizophrenia      Past Surgical History:   Procedure Laterality Date    ABDOMINAL SURGERY       SECTION      CYSTOSCOPY W/ RETROGRADES Right 3/19/2020    Procedure: CYSTOSCOPY, WITH RETROGRADE PYELOGRAM;  Surgeon: Shelley Mai Jr., MD;  Location: [facility] OR;  Service: Urology;  Laterality: Right;    CYSTOSCOPY W/ URETERAL STENT PLACEMENT Right 3/19/2020    Procedure: CYSTOSCOPY, WITH URETERAL STENT INSERTION;  Surgeon: Shelley Mai Jr., MD;  Location: [facility] OR;  Service: Urology;  Laterality: Right;    LASER LITHOTRIPSY Right 3/19/2020    Procedure: LITHOTRIPSY, USING LASER;  Surgeon: Shelley Mai Jr., MD;  Location: [facility] OR;  Service: Urology;  Laterality: Right;    TUBAL LIGATION      URETEROSCOPIC REMOVAL OF URETERIC CALCULUS Right 3/19/2020    Procedure: REMOVAL, CALCULUS, URETER, URETEROSCOPIC;  Surgeon: Shelley Mai Jr., MD;  Location: [facility] OR;  Service: Urology;  Laterality: Right;     Family History   Problem Relation Age of Onset    Cancer Maternal Aunt      Social History     Tobacco Use    Smoking status: Former    Smokeless tobacco: Never   Substance Use Topics    Alcohol use: Yes     Comment: socially    Drug use: Not Currently     Types: Marijuana, Methamphetamines, Amphetamines, "Crack" cocaine, Heroin     Review of Systems   Constitutional:  Positive for chills, fatigue and fever. Negative for appetite change. "   HENT:  Positive for congestion, rhinorrhea and sore throat.    Respiratory:  Positive for cough. Negative for shortness of breath.    Cardiovascular:  Negative for chest pain.   Gastrointestinal:  Positive for diarrhea and nausea. Negative for abdominal pain and vomiting.   Musculoskeletal:  Positive for myalgias.   All other systems reviewed and are negative.      Physical Exam     Initial Vitals [10/03/23 1314]   BP Pulse Resp Temp SpO2   119/84 103 20 99.6 °F (37.6 °C) 98 %      MAP       --         Physical Exam    Nursing note and vitals reviewed.  Constitutional: She appears well-developed and well-nourished. No distress.   HENT:   Head: Normocephalic and atraumatic.   Mouth/Throat: Oropharynx is clear and moist.   Eyes: Pupils are equal, round, and reactive to light.   Neck:   Normal range of motion.  Cardiovascular:  Normal rate and regular rhythm.           Pulmonary/Chest: Breath sounds normal. No respiratory distress.   Abdominal: Abdomen is soft. Bowel sounds are normal. There is no abdominal tenderness.   Musculoskeletal:         General: Normal range of motion.      Cervical back: Normal range of motion.     Neurological: She is alert and oriented to person, place, and time. She has normal strength. GCS score is 15. GCS eye subscore is 4. GCS verbal subscore is 5. GCS motor subscore is 6.   Skin: Skin is warm and dry. Capillary refill takes 2 to 3 seconds.   Psychiatric: She has a normal mood and affect. Thought content normal.         ED Course   Procedures  Labs Reviewed   INFLUENZA A & B BY MOLECULAR   GROUP A STREP, MOLECULAR   SARS-COV-2 RNA AMPLIFICATION, QUAL    Narrative:     Is the patient symptomatic?->Yes          Imaging Results    None          Medications - No data to display  Medical Decision Making  Presents for evaluation of flu-like illness, lab work ordered.  Disposition pending  Differentials including but not limited to viral illness, bacterial illness, otitis, sinusitis,  bronchitis, pneumonia   plan of care:  Negative COVID, flu, strep.  Bowel presentation.  Patient will be discharged home, prescriptions for home use.  To follow up with clinic.  Return as needed.  Declines work note.  Patient agrees with plan of care    Amount and/or Complexity of Data Reviewed  Labs: ordered. Decision-making details documented in ED Course.    Risk  OTC drugs.  Prescription drug management.                               Clinical Impression:   Final diagnoses:  [B34.9] Viral syndrome (Primary)        ED Disposition Condition    Discharge Stable          ED Prescriptions       Medication Sig Dispense Start Date End Date Auth. Provider    promethazine (PHENERGAN) 25 MG suppository Place 1 suppository (25 mg total) rectally every 6 (six) hours as needed for Nausea. 30 suppository 10/3/2023 -- Denisse Salsa NP          Follow-up Information       Follow up With Specialties Details Why Contact Info    Gerhard Sequeira III, MD General Surgery Call in 3 days  [address] [place] [address]               Denisse Salas NP  10/03/23 7045

## 2023-10-03 NOTE — DISCHARGE INSTRUCTIONS
Rest, increase fluids, lots of water and liquids.  Tylenol and or Motrin as needed.  Negative COVID, flu, strep today.  Call office for recheck.  Return as needed

## 2024-01-30 ENCOUNTER — HOSPITAL ENCOUNTER (OUTPATIENT)
Dept: RADIOLOGY | Facility: HOSPITAL | Age: 39
Discharge: HOME | End: 2024-01-30
Attending: PHYSICIAN ASSISTANT
Payer: MEDICARE

## 2024-01-30 DIAGNOSIS — Z12.31 ENCOUNTER FOR SCREENING MAMMOGRAM FOR BREAST CANCER: ICD-10-CM

## 2024-01-30 PROCEDURE — 77067 SCR MAMMO BI INCL CAD: CPT | Mod: TC

## 2024-01-30 PROCEDURE — 77063 BREAST TOMOSYNTHESIS BI: CPT | Mod: 26,,, | Performed by: RADIOLOGY

## 2024-01-30 PROCEDURE — 77067 SCR MAMMO BI INCL CAD: CPT | Mod: 26,,, | Performed by: RADIOLOGY

## 2024-02-12 ENCOUNTER — HOSPITAL ENCOUNTER (OUTPATIENT)
Dept: RADIOLOGY | Facility: HOSPITAL | Age: 39
Discharge: HOME | End: 2024-02-12
Attending: PHYSICIAN ASSISTANT
Payer: MEDICARE

## 2024-02-12 DIAGNOSIS — R92.8 ABNORMAL MAMMOGRAM: ICD-10-CM

## 2024-02-12 PROCEDURE — 77066 DX MAMMO INCL CAD BI: CPT | Mod: TC

## 2024-02-12 PROCEDURE — 76642 ULTRASOUND BREAST LIMITED: CPT | Mod: TC,50

## 2024-02-12 PROCEDURE — 77062 BREAST TOMOSYNTHESIS BI: CPT | Mod: 26,,, | Performed by: RADIOLOGY

## 2024-02-12 PROCEDURE — 76642 ULTRASOUND BREAST LIMITED: CPT | Mod: 26,50,, | Performed by: RADIOLOGY

## 2024-02-12 PROCEDURE — 77066 DX MAMMO INCL CAD BI: CPT | Mod: 26,,, | Performed by: RADIOLOGY

## 2024-03-17 ENCOUNTER — HOSPITAL ENCOUNTER (EMERGENCY)
Facility: HOSPITAL | Age: 39
Discharge: HOME | End: 2024-03-17
Attending: STUDENT IN AN ORGANIZED HEALTH CARE EDUCATION/TRAINING PROGRAM
Payer: MEDICARE

## 2024-03-17 VITALS
HEIGHT: 66 IN | OXYGEN SATURATION: 98 % | WEIGHT: 250 LBS | SYSTOLIC BLOOD PRESSURE: 94 MMHG | TEMPERATURE: 99 F | DIASTOLIC BLOOD PRESSURE: 64 MMHG | RESPIRATION RATE: 16 BRPM | BODY MASS INDEX: 40.18 KG/M2 | HEART RATE: 99 BPM

## 2024-03-17 DIAGNOSIS — M25.579 ANKLE PAIN: ICD-10-CM

## 2024-03-17 PROCEDURE — 25000003 PHARM REV CODE 250: Performed by: STUDENT IN AN ORGANIZED HEALTH CARE EDUCATION/TRAINING PROGRAM

## 2024-03-17 PROCEDURE — 73610 X-RAY EXAM OF ANKLE: CPT | Mod: TC,RT

## 2024-03-17 PROCEDURE — 99283 EMERGENCY DEPT VISIT LOW MDM: CPT | Mod: 25

## 2024-03-17 PROCEDURE — 73610 X-RAY EXAM OF ANKLE: CPT | Mod: 26,RT,, | Performed by: RADIOLOGY

## 2024-03-17 RX ORDER — HYDROCODONE BITARTRATE AND ACETAMINOPHEN 5; 325 MG/1; MG/1
1 TABLET ORAL
Status: COMPLETED | OUTPATIENT
Start: 2024-03-17 | End: 2024-03-17

## 2024-03-17 RX ADMIN — HYDROCODONE BITARTRATE AND ACETAMINOPHEN 1 TABLET: 5; 325 TABLET ORAL at 12:03

## 2024-03-17 NOTE — ED TRIAGE NOTES
"Pt presents to the er via [facility] with c/o left ankle pain. Pt reports that she fell off stairs on Friday and broke her ankle. Pt states she was seen at [place]. Pt reports this morning she was ambulating to the restroom and heard a "pop" to the left ankle. Now reporting pain   "

## 2024-03-17 NOTE — ED PROVIDER NOTES
Encounter Date: 3/17/2024       History     Chief Complaint   Patient presents with    Ankle Pain     38-year-old female with right ankle pain. She is status post right ankle fracture from a fall 2 days ago for which was reduced and placed in a splint, with recommendation to follow up with ortho for further evaluation. She presents to ED after bumping her foot against furniture. There is no motor or sensory deficits, good cap refill of the toes.  She last took a pill of Percocet 6 hours prior to arrival.    The history is provided by the patient. No  was used.     Review of patient's allergies indicates:  No Known Allergies  Past Medical History:   Diagnosis Date    Mental disorder     Schizophrenia      Past Surgical History:   Procedure Laterality Date    ABDOMINAL SURGERY       SECTION      CYSTOSCOPY W/ RETROGRADES Right 3/19/2020    Procedure: CYSTOSCOPY, WITH RETROGRADE PYELOGRAM;  Surgeon: Shelley Mai Jr., MD;  Location: [facility] OR;  Service: Urology;  Laterality: Right;    CYSTOSCOPY W/ URETERAL STENT PLACEMENT Right 3/19/2020    Procedure: CYSTOSCOPY, WITH URETERAL STENT INSERTION;  Surgeon: Shelley Mai Jr., MD;  Location: [facility] OR;  Service: Urology;  Laterality: Right;    LASER LITHOTRIPSY Right 3/19/2020    Procedure: LITHOTRIPSY, USING LASER;  Surgeon: Shelley Mai Jr., MD;  Location: [facility] OR;  Service: Urology;  Laterality: Right;    TUBAL LIGATION      URETEROSCOPIC REMOVAL OF URETERIC CALCULUS Right 3/19/2020    Procedure: REMOVAL, CALCULUS, URETER, URETEROSCOPIC;  Surgeon: Shelley Mai Jr., MD;  Location: [facility] OR;  Service: Urology;  Laterality: Right;     Family History   Problem Relation Age of Onset    Breast cancer Maternal Aunt     Cancer Maternal Aunt     Breast cancer Maternal Grandmother      Social History     Tobacco Use    Smoking status: Former    Smokeless tobacco: Never   Substance Use Topics    Alcohol use: Yes     Comment: socially    Drug use: Not  "Currently     Types: Marijuana, Methamphetamines, Amphetamines, "Crack" cocaine, Heroin     Review of Systems   Constitutional: Negative.    HENT: Negative.     Eyes: Negative.    Respiratory: Negative.     Cardiovascular: Negative.    Gastrointestinal: Negative.    Endocrine: Negative.    Genitourinary: Negative.    Musculoskeletal:  Positive for arthralgias.   Skin: Negative.    Neurological: Negative.    Hematological: Negative.    Psychiatric/Behavioral: Negative.     All other systems reviewed and are negative.      Physical Exam     Initial Vitals [03/17/24 1240]   BP Pulse Resp Temp SpO2   94/64 99 17 98.9 °F (37.2 °C) 98 %      MAP       --         Physical Exam    Nursing note and vitals reviewed.  Constitutional: She appears well-developed.   HENT:   Head: Normocephalic.   Eyes: Pupils are equal, round, and reactive to light.   Neck:   Normal range of motion.  Cardiovascular:  Normal rate.           Pulmonary/Chest: Breath sounds normal.   Abdominal: Abdomen is soft. Bowel sounds are normal.   Musculoskeletal:      Cervical back: Normal range of motion.      Comments: Right foot in the long posterior splint.  Cap refill of toes less than 2 seconds, no sensory deficits.     Neurological: She is oriented to person, place, and time. GCS score is 15. GCS eye subscore is 4. GCS verbal subscore is 5. GCS motor subscore is 6.   Skin: Skin is warm. Capillary refill takes less than 2 seconds.   Psychiatric: She has a normal mood and affect.         ED Course   Procedures  Labs Reviewed - No data to display       Imaging Results              X-Ray Ankle Complete Right (Final result)  Result time 03/17/24 13:09:58      Final result by Jeff Spicer MD (03/17/24 13:09:58)                   Narrative:    EXAMINATION:  XR ANKLE COMPLETE 3 VIEW RIGHT    CLINICAL HISTORY:  Pain in unspecified ankle and joints of unspecified foot    TECHNIQUE:  AP, lateral, and oblique images of the right ankle were " performed.    COMPARISON:  None    FINDINGS:  Cast material obscures fine detail.    There is an obliquely oriented fracture of the distal fibula which is minimally displaced.  There is widening of the medial clear space and slight widening of the distal tibiofibular articulation, suspicious for underlying ligamentous injury.    No osteochondral lesion talar dome.  Preserved joint spaces.      Electronically signed by: Jeff Spicer  Date:    03/17/2024  Time:    13:09                                     Medications   HYDROcodone-acetaminophen 5-325 mg per tablet 1 tablet (1 tablet Oral Given 3/17/24 1251)     Medical Decision Making  38-year-old female with right ankle pain.  S/p right ankle fracture from a fall 2 days ago for which was reduced and placed in a splint, with recommendation to follow up with ortho for further evaluation.  Right foot in the long posterior splint looks good. Cap refill of toes less than 2 seconds, no sensory deficits.   She last took a pill of Percocet 6 hours prior to arrival.  Given repeat dose of Norco  here in the ED.  Repeat x-ray shows good alignment distal fibular fracture  We will discharge, recommendation to follow up with ortho as previously advised  The patient voiced understanding and agreed with the plan          Amount and/or Complexity of Data Reviewed  External Data Reviewed: radiology.  Radiology: ordered.    Risk  Prescription drug management.                                      Clinical Impression:  Final diagnoses:  [M25.579] Ankle pain  [M25.579] Ankle pain - s/p reduction ankle fracture on  3/15          ED Disposition Condition    Discharge Stable          ED Prescriptions    None       Follow-up Information       Follow up With Specialties Details Why Contact Info    Gerhard Sequeira III, MD General Surgery  As needed [address] [place] [address]               Geo Carrillo MD  03/25/24 3739

## 2024-08-12 ENCOUNTER — HOSPITAL ENCOUNTER (OUTPATIENT)
Dept: RADIOLOGY | Facility: HOSPITAL | Age: 39
Discharge: HOME | End: 2024-08-12
Attending: OBSTETRICS & GYNECOLOGY
Payer: MEDICARE

## 2024-08-12 DIAGNOSIS — R92.8 ABNORMAL MAMMOGRAM: ICD-10-CM

## 2024-08-12 PROCEDURE — 76642 ULTRASOUND BREAST LIMITED: CPT | Mod: TC,LT

## 2024-08-12 PROCEDURE — 77061 BREAST TOMOSYNTHESIS UNI: CPT | Mod: TC,LT

## 2024-08-12 PROCEDURE — 77065 DX MAMMO INCL CAD UNI: CPT | Mod: 26,LT,, | Performed by: RADIOLOGY

## 2024-08-12 PROCEDURE — 77065 DX MAMMO INCL CAD UNI: CPT | Mod: TC,LT

## 2024-08-12 PROCEDURE — 76642 ULTRASOUND BREAST LIMITED: CPT | Mod: 26,LT,, | Performed by: RADIOLOGY

## 2024-08-12 PROCEDURE — 77061 BREAST TOMOSYNTHESIS UNI: CPT | Mod: 26,LT,, | Performed by: RADIOLOGY

## 2024-08-13 DIAGNOSIS — R92.8 ABNORMAL MAMMOGRAM: Primary | ICD-10-CM

## 2024-09-04 ENCOUNTER — HOSPITAL ENCOUNTER (OUTPATIENT)
Dept: RADIOLOGY | Facility: HOSPITAL | Age: 39
Discharge: HOME | End: 2024-09-04
Attending: PHYSICIAN ASSISTANT
Payer: MEDICARE

## 2024-09-04 DIAGNOSIS — R92.8 ABNORMAL MAMMOGRAM: ICD-10-CM

## 2024-09-04 PROCEDURE — C8937 CAD BREAST MRI: HCPCS | Mod: TC,PO

## 2024-09-04 PROCEDURE — 25500020 PHARM REV CODE 255: Mod: PO | Performed by: PHYSICIAN ASSISTANT

## 2024-09-04 PROCEDURE — A9585 GADOBUTROL INJECTION: HCPCS | Mod: PO | Performed by: PHYSICIAN ASSISTANT

## 2024-09-04 RX ORDER — GADOBUTROL 604.72 MG/ML
11 INJECTION INTRAVENOUS
Status: COMPLETED | OUTPATIENT
Start: 2024-09-04 | End: 2024-09-04

## 2024-09-04 RX ADMIN — GADOBUTROL 11 ML: 604.72 INJECTION INTRAVENOUS at 09:09

## 2024-09-12 ENCOUNTER — TELEPHONE (OUTPATIENT)
Dept: RADIOLOGY | Facility: HOSPITAL | Age: 39
End: 2024-09-12

## 2024-09-13 ENCOUNTER — TELEPHONE (OUTPATIENT)
Dept: RADIOLOGY | Facility: HOSPITAL | Age: 39
End: 2024-09-13

## 2024-09-20 ENCOUNTER — HOSPITAL ENCOUNTER (OUTPATIENT)
Dept: RADIOLOGY | Facility: HOSPITAL | Age: 39
Discharge: HOME | End: 2024-09-20
Attending: OBSTETRICS & GYNECOLOGY
Payer: MEDICARE

## 2024-09-20 DIAGNOSIS — R92.8 ABNORMAL MRI, BREAST: ICD-10-CM

## 2024-09-20 PROCEDURE — 76642 ULTRASOUND BREAST LIMITED: CPT | Mod: 26,LT,, | Performed by: RADIOLOGY

## 2024-09-20 PROCEDURE — 76642 ULTRASOUND BREAST LIMITED: CPT | Mod: TC,PO,LT

## 2024-10-02 ENCOUNTER — HOSPITAL ENCOUNTER (OUTPATIENT)
Dept: RADIOLOGY | Facility: HOSPITAL | Age: 39
Discharge: HOME | End: 2024-10-02
Attending: OBSTETRICS & GYNECOLOGY
Payer: MEDICARE

## 2024-10-02 DIAGNOSIS — R92.8 ABNORMAL ULTRASOUND OF BREAST: ICD-10-CM

## 2024-10-02 PROCEDURE — 27000342 US BREAST BIOPSY WITH IMAGING 1ST SITE LEFT: Mod: PO

## 2024-10-02 PROCEDURE — A4648 IMPLANTABLE TISSUE MARKER: HCPCS | Mod: PO

## 2024-10-02 PROCEDURE — 19083 BX BREAST 1ST LESION US IMAG: CPT | Mod: LT,,, | Performed by: RADIOLOGY

## 2024-10-02 PROCEDURE — 88305 TISSUE EXAM BY PATHOLOGIST: CPT | Mod: TC | Performed by: PATHOLOGY

## 2024-10-02 PROCEDURE — 63600175 PHARM REV CODE 636 W HCPCS: Mod: PO | Performed by: RADIOLOGY

## 2024-10-02 PROCEDURE — 27200940 US BREAST BIOPSY WITH IMAGING 1ST SITE LEFT: Mod: PO

## 2024-10-02 PROCEDURE — A4215 STERILE NEEDLE: HCPCS | Mod: PO

## 2024-10-02 PROCEDURE — 19083 BX BREAST 1ST LESION US IMAG: CPT | Mod: PO,LT

## 2024-10-02 RX ORDER — LIDOCAINE HYDROCHLORIDE 10 MG/ML
INJECTION, SOLUTION EPIDURAL; INFILTRATION; INTRACAUDAL; PERINEURAL
Status: COMPLETED | OUTPATIENT
Start: 2024-10-02 | End: 2024-10-02

## 2024-10-02 RX ADMIN — LIDOCAINE HYDROCHLORIDE 5 ML: 10 INJECTION, SOLUTION EPIDURAL; INFILTRATION; INTRACAUDAL at 01:10

## 2024-10-02 NOTE — PLAN OF CARE
Pt tolerated left breast biopsy well performed by dr de oliveira, no co pain no acute distress noted ,post procedure mammogram done and reviewed by dr de oliveira, discharge instructions given to pt verbalized understanding, discharged to home ambulatory with family.

## 2024-10-04 ENCOUNTER — TELEPHONE (OUTPATIENT)
Facility: CLINIC | Age: 39
End: 2024-10-04
Payer: MEDICARE

## 2024-10-04 DIAGNOSIS — D36.9 INTRADUCTAL PAPILLOMA: Primary | ICD-10-CM

## 2024-10-04 NOTE — TELEPHONE ENCOUNTER
Spoke with pt making her aware her breast biopsy returning negative for malignancy. Due to returning intraductal papilloma, I have placed referral for general surgery. PT verbalized understanding.

## 2024-10-11 ENCOUNTER — OFFICE VISIT (OUTPATIENT)
Dept: SURGERY | Facility: CLINIC | Age: 39
End: 2024-10-11
Payer: MEDICARE

## 2024-10-11 VITALS
WEIGHT: 251 LBS | HEIGHT: 66 IN | SYSTOLIC BLOOD PRESSURE: 143 MMHG | BODY MASS INDEX: 40.34 KG/M2 | DIASTOLIC BLOOD PRESSURE: 90 MMHG | HEART RATE: 77 BPM | RESPIRATION RATE: 98 BRPM

## 2024-10-11 DIAGNOSIS — D36.9 INTRADUCTAL PAPILLOMA: Primary | ICD-10-CM

## 2024-10-11 PROCEDURE — 99999 PR PBB SHADOW E&M-EST. PATIENT-LVL V: CPT | Mod: PBBFAC,,, | Performed by: SPECIALIST

## 2024-10-11 RX ORDER — SODIUM CHLORIDE 9 MG/ML
INJECTION, SOLUTION INTRAVENOUS CONTINUOUS
OUTPATIENT
Start: 2024-10-11

## 2024-10-11 NOTE — PATIENT INSTRUCTIONS
Pre-operative Instructions     Date of procedure:  10/21/24      Do not take the following Medications for 3 days prior to your procedure:   Aspirin, Excedrin, BC powder or goodies powders   Ibuprofen or Motrin  Naproxen or Aleve  Fish oils  Vitamin E    Pre-OP Instructions  On the night of 10/20/24 Scrub the surgical area with Hibiclens for several minutes  On the morning of 10/21/24 scrub the surgical area with Hibiclens for several minutes  Do not shave or clip hair at the site of your surgery  Do not wear jewelry to the hospital.  You will have to remove it.  Leave at home so that it is not lost.    Food/Drink   Do not eat or drink after Midnight     Location of Department:   [facility] [address], [place] [address]    Parking:    Use parking lot 1  Enter at the main hospital entrance  Check in with outpatient registration    Contact:   Someone from surgery will call you with a time of arrival.   If you surgery is on Monday, someone will contact you on Friday.  If you do not receive a call from surgery by 2pm the business day (10/18/24) before your procedure, please call [phone].     Medications:   You may take your blood pressure/thyroid/seizure medications with a small sip of water. Take all other morning medications after the procedure        Transportation:   You will NOT be able to drive yourself.  You are required to have someone drive you home from the hospital due to the anesthesia.             Post-operative Instructions      RESTRICTIONS:   During your procedure today, you received medications for sedation.     These medications may affect your judgement, balance, and coordination.     DO NOT drive a car, operate machinery, make legal/financial decisions, sign important papers or drink alcohol on day of procedure.     ACTIVITY:   After your surgery you CANNOT LIFT anything over 10 pounds, no pushing, no pulling, no bending or no strenuous exercise UNTIL  released by doctor.  Do not bath until your sutures or staples are removed.  You may take a shower. Keep surgical areas clean and dry, re-bandage areas as needed.     DIET AND MEDICATIONS    May eat and drink normally unless instructed otherwise.     Continue present medications unless otherwise instructed     TREATMENT FOR COMMON SIDE EFFECTS:   Pain medication is prescribed to be taken as needed, NOT on a schedule. Use pain medication for periods of high activity and before sleep. Providers have limits on amounts of medications that they may prescribe. Use pain medication properly to assure availability.     Because air was used during your procedure you may experience mild abdominal pain, nausea, belching, bloating or excessive gas: walking, rest, eat lightly and use a heating pad to help alleviate discomfort.     Sore throat: treat with throat lozenges and/or gargle with warm salt water.     If a bowel prep was taken, you may not have a bowel movement for 1-3 days. This is normal.     IF YOU HAVE ANY OF THE SYMPTOMS BELOW, REPORT TO YOUR PHYSICIAN:     1. Excessive or unexpected pain in back or abdomen     2. Signs of infection such as: chills or fever occurring within 24 hours after the procedure.     3. Rectal bleeding, which would show as bright red, maroon, or black stools. (A tablespoon of blood from the rectum is not serious, especially if hemorrhoids are present.)     4. Vomiting     5. Weakness or dizziness.     IF YOU EXPERIENCE ANY OF THE FOLLOWING, GO DIRECTLY TO THE NEAREST EMERGENCY ROOM:  1. Difficulty breathing     2. Chills and/or fever over 101 F     3. Persistent vomiting and/or vomiting blood     4. Severe abdominal pain     5. Chest pain     6. Black, tarry stools     7. Bleeding-more than one tablespoon     8. Any other symptoms or condition that you feel may need urgent attention.       YOUR DOCTOR RECOMMENDS THESE ADDITIONAL INSTRUCTIONS:     1. If any biopsies were taken, your results  will be discussed at your follow up appointment     2. Further recommendations will depend on how you are recovering from you procedure

## 2024-10-11 NOTE — PROGRESS NOTES
"Subjective     Patient ID: Eleno Kiran  is a 39 y.o. female     Chief Complaint:   Chief Complaint   Patient presents with    Referral     Intraductal papilloma       Vitals:    10/11/24 1043   BP: (!) 143/90   Pulse: 77   Resp: (!) 98   Weight: 113.9 kg (251 lb)   Height: 5' 6" (1.676 m)       HPI     Referral     Additional comments: Intraductal papilloma           Last edited by Cesar Valentino MA on 10/11/2024 10:43 AM.      Patient is a very pleasant 39-year-old female who presents in referral from Dr. Greenwood for evaluation of intraductal papilloma that was biopsied using stereotactic technique earlier within the last month.  Previous biopsy in the past with unknown pathology report.  She has not MRI as well that shows a secondary area of of architectural distortion that needs to be re-evaluated in 6 months.  Intraductal papilloma was reason for referral.  Positive strong family history of breast cancer both her and on her mother's side and grandmother were both diagnosed with breast cancer.  No history of trauma or abnormal leakage from her nipples.  Was noted.  Past Medical History:   Diagnosis Date    Mental disorder     Schizophrenia      Past Surgical History:   Procedure Laterality Date    ABDOMINAL SURGERY       SECTION      CYSTOSCOPY W/ RETROGRADES Right 3/19/2020    Procedure: CYSTOSCOPY, WITH RETROGRADE PYELOGRAM;  Surgeon: Shelley Mai Jr., MD;  Location: [facility] OR;  Service: Urology;  Laterality: Right;    CYSTOSCOPY W/ URETERAL STENT PLACEMENT Right 3/19/2020    Procedure: CYSTOSCOPY, WITH URETERAL STENT INSERTION;  Surgeon: Shelley Mai Jr., MD;  Location: [facility] OR;  Service: Urology;  Laterality: Right;    LASER LITHOTRIPSY Right 3/19/2020    Procedure: LITHOTRIPSY, USING LASER;  Surgeon: Shelley Mai Jr., MD;  Location: [facility] OR;  Service: Urology;  Laterality: Right;    TUBAL LIGATION      URETEROSCOPIC REMOVAL OF URETERIC CALCULUS Right 3/19/2020    Procedure: REMOVAL, " "CALCULUS, URETER, URETEROSCOPIC;  Surgeon: Shelley Mai Jr., MD;  Location: [facility] OR;  Service: Urology;  Laterality: Right;     Family History   Problem Relation Name Age of Onset    Breast cancer Maternal Aunt      Cancer Maternal Aunt      Breast cancer Maternal Grandmother       Past Surgical History:   Procedure Laterality Date    ABDOMINAL SURGERY       SECTION      CYSTOSCOPY W/ RETROGRADES Right 3/19/2020    Procedure: CYSTOSCOPY, WITH RETROGRADE PYELOGRAM;  Surgeon: Shelley Mai Jr., MD;  Location: [facility] OR;  Service: Urology;  Laterality: Right;    CYSTOSCOPY W/ URETERAL STENT PLACEMENT Right 3/19/2020    Procedure: CYSTOSCOPY, WITH URETERAL STENT INSERTION;  Surgeon: Shelley Mai Jr., MD;  Location: [facility] OR;  Service: Urology;  Laterality: Right;    LASER LITHOTRIPSY Right 3/19/2020    Procedure: LITHOTRIPSY, USING LASER;  Surgeon: Shelley Mai Jr., MD;  Location: [facility] OR;  Service: Urology;  Laterality: Right;    TUBAL LIGATION      URETEROSCOPIC REMOVAL OF URETERIC CALCULUS Right 3/19/2020    Procedure: REMOVAL, CALCULUS, URETER, URETEROSCOPIC;  Surgeon: Shelley Mai Jr., MD;  Location: [facility] OR;  Service: Urology;  Laterality: Right;     Social History     Socioeconomic History    Marital status: Single   Tobacco Use    Smoking status: Former    Smokeless tobacco: Never   Substance and Sexual Activity    Alcohol use: Yes     Comment: socially    Drug use: Not Currently     Types: Marijuana, Methamphetamines, Amphetamines, "Crack" cocaine, Heroin    Sexual activity: Yes     Partners: Male     Birth control/protection: See Surgical Hx        Current Outpatient Medications:     buPROPion (WELLBUTRIN XL) 150 MG TB24 tablet, Take 150 mg by mouth., Disp: , Rfl:     busPIRone (BUSPAR) 10 MG tablet, Take 10 mg by mouth 3 (three) times daily., Disp: , Rfl:     ibuprofen (ADVIL,MOTRIN) 600 MG tablet, Take 1 tablet (600 mg total) by mouth every 8 (eight) hours as needed for Pain., Disp: 20 tablet, " Rfl: 0    ondansetron (ZOFRAN-ODT) 8 MG TbDL, Take 1 tablet (8 mg total) by mouth 3 (three) times daily as needed (nausea and vomiting)., Disp: 20 tablet, Rfl: 0    paroxetine (PAXIL) 40 MG tablet, Take 40 mg by mouth., Disp: , Rfl:     promethazine (PHENERGAN) 25 MG suppository, Place 1 suppository (25 mg total) rectally every 6 (six) hours as needed for Nausea., Disp: 30 suppository, Rfl: 1    ALPRAZolam (XANAX) 0.5 MG tablet, Take 0.5 mg by mouth 3 (three) times daily. (Patient not taking: Reported on 10/11/2024), Disp: , Rfl:     OLANZapine (ZYPREXA) 10 MG tablet, Take 10 mg by mouth every evening. (Patient not taking: Reported on 10/11/2024), Disp: , Rfl:     OLANZapine (ZYPREXA) 20 MG tablet, Take 20 mg by mouth. (Patient not taking: Reported on 10/11/2024), Disp: , Rfl:     temazepam (RESTORIL) 15 mg Cap, Take 15 mg by mouth every evening. (Patient not taking: Reported on 10/11/2024), Disp: , Rfl:     traZODone (DESYREL) 100 MG tablet, Take 100 mg by mouth nightly as needed. (Patient not taking: Reported on 10/11/2024), Disp: , Rfl:     VRAYLAR 1.5 mg Cap, Take 1.5 mg by mouth Daily. (Patient not taking: Reported on 10/11/2024), Disp: , Rfl:    Review of patient's allergies indicates:  No Known Allergies         Review of Systems   Skin:         Small wound lateral aspect of the left breast from a stereotactic biopsy otherwise no palpable abnormalities appreciated on exam        I have reviewed the following:     Details / Date    []   Labs     []   Micro     []   Pathology     [x]   Imaging Images reviewed as well as discussion with the radiologist    []   Cardiology Procedures     []   Other         Objective         Physical Exam  Vitals and nursing note reviewed. Exam conducted with a chaperone present.   Constitutional:       Appearance: Normal appearance. She is normal weight.   HENT:      Head: Normocephalic and atraumatic.      Mouth/Throat:      Mouth: Mucous membranes are moist.   Eyes:       Extraocular Movements: Extraocular movements intact.      Conjunctiva/sclera: Conjunctivae normal.      Pupils: Pupils are equal, round, and reactive to light.   Cardiovascular:      Rate and Rhythm: Normal rate.      Pulses: Normal pulses.   Pulmonary:      Effort: Pulmonary effort is normal.   Abdominal:      General: Abdomen is flat.      Palpations: Abdomen is soft.   Musculoskeletal:         General: Normal range of motion.      Cervical back: Normal range of motion and neck supple.   Skin:     General: Skin is warm.      Comments: Left breast exam no appreciable palpable masses or abnormalities appreciated.  Small punctate wound healing nicely   Neurological:      General: No focal deficit present.      Mental Status: She is alert and oriented to person, place, and time. Mental status is at baseline.   Psychiatric:         Mood and Affect: Mood normal.          Assessment and Plan     1. Intraductal papilloma  -     Ambulatory referral/consult to General Surgery  -     Case Request Operating Room: EXCISION, LESION, BREAST, WITH NEEDLE LOCALIZATION    Other orders  -     Vital signs; Standing  -     Insert peripheral IV; Standing  -     Diet NPO; Standing  -     0.9%  NaCl infusion  -     IP VTE LOW RISK PATIENT; Standing  -     Full code; Standing  -     Place in Outpatient; Standing  -     Place ANNALISE hose; Standing  -     ceFAZolin 2 g in D5W 50 mL IVPB (MB+)     As thorough review of films and discussion with Radiology with area of intraductal papilloma needs to be completely excised to rule out malignant potential versus early abnormal cells.  Proximally 10% of these to 20% can have malignant potential.  Area of architectural distortion needs to be re-evaluated with a with a MRI in 6 months.  This was discussed thoroughly with the patient states he understands and wishes to proceed.  Plan needle needle localized breast biopsy left side outpatient surgery  Orders Placed This Encounter   Procedures    Insert  peripheral IV        An After Visit Summary was printed and given to the patient.       Moses Arciniega MD  [facility] 2nd Floor General Surgery  [address] [place] [address]  [phone]     This note was created using [employer] direct voice recognition software. Note may have occasional typographical errors that may not have been identified and edited despite initial review prior to signing.     Patient instructed that best way to communicate with my office staff is for patient to get on the [facility] epic patient portal to expedite communication and communication issues that may occur.  Patient was given instructions on how to get on the portal.  I encouraged patient to obtain portal access as well.  Ultimately it is up to the patient to obtain access.  Patient voiced understanding.

## 2024-10-11 NOTE — H&P (VIEW-ONLY)
"Subjective     Patient ID: Eleno Kiran  is a 39 y.o. female     Chief Complaint:   Chief Complaint   Patient presents with    Referral     Intraductal papilloma       Vitals:    10/11/24 1043   BP: (!) 143/90   Pulse: 77   Resp: (!) 98   Weight: 113.9 kg (251 lb)   Height: 5' 6" (1.676 m)       HPI     Referral     Additional comments: Intraductal papilloma           Last edited by Cesar Valentino MA on 10/11/2024 10:43 AM.      Patient is a very pleasant 39-year-old female who presents in referral from Dr. Greenwood for evaluation of intraductal papilloma that was biopsied using stereotactic technique earlier within the last month.  Previous biopsy in the past with unknown pathology report.  She has not MRI as well that shows a secondary area of of architectural distortion that needs to be re-evaluated in 6 months.  Intraductal papilloma was reason for referral.  Positive strong family history of breast cancer both her and on her mother's side and grandmother were both diagnosed with breast cancer.  No history of trauma or abnormal leakage from her nipples.  Was noted.  Past Medical History:   Diagnosis Date    Mental disorder     Schizophrenia      Past Surgical History:   Procedure Laterality Date    ABDOMINAL SURGERY       SECTION      CYSTOSCOPY W/ RETROGRADES Right 3/19/2020    Procedure: CYSTOSCOPY, WITH RETROGRADE PYELOGRAM;  Surgeon: Shelley Mai Jr., MD;  Location: [facility] OR;  Service: Urology;  Laterality: Right;    CYSTOSCOPY W/ URETERAL STENT PLACEMENT Right 3/19/2020    Procedure: CYSTOSCOPY, WITH URETERAL STENT INSERTION;  Surgeon: Shelley Mai Jr., MD;  Location: [facility] OR;  Service: Urology;  Laterality: Right;    LASER LITHOTRIPSY Right 3/19/2020    Procedure: LITHOTRIPSY, USING LASER;  Surgeon: Shelley Mai Jr., MD;  Location: [facility] OR;  Service: Urology;  Laterality: Right;    TUBAL LIGATION      URETEROSCOPIC REMOVAL OF URETERIC CALCULUS Right 3/19/2020    Procedure: REMOVAL, " "CALCULUS, URETER, URETEROSCOPIC;  Surgeon: Shelley Mai Jr., MD;  Location: [facility] OR;  Service: Urology;  Laterality: Right;     Family History   Problem Relation Name Age of Onset    Breast cancer Maternal Aunt      Cancer Maternal Aunt      Breast cancer Maternal Grandmother       Past Surgical History:   Procedure Laterality Date    ABDOMINAL SURGERY       SECTION      CYSTOSCOPY W/ RETROGRADES Right 3/19/2020    Procedure: CYSTOSCOPY, WITH RETROGRADE PYELOGRAM;  Surgeon: Shelley Mai Jr., MD;  Location: [facility] OR;  Service: Urology;  Laterality: Right;    CYSTOSCOPY W/ URETERAL STENT PLACEMENT Right 3/19/2020    Procedure: CYSTOSCOPY, WITH URETERAL STENT INSERTION;  Surgeon: Shelley Mai Jr., MD;  Location: [facility] OR;  Service: Urology;  Laterality: Right;    LASER LITHOTRIPSY Right 3/19/2020    Procedure: LITHOTRIPSY, USING LASER;  Surgeon: Shelley Mai Jr., MD;  Location: [facility] OR;  Service: Urology;  Laterality: Right;    TUBAL LIGATION      URETEROSCOPIC REMOVAL OF URETERIC CALCULUS Right 3/19/2020    Procedure: REMOVAL, CALCULUS, URETER, URETEROSCOPIC;  Surgeon: Shelley Mai Jr., MD;  Location: [facility] OR;  Service: Urology;  Laterality: Right;     Social History     Socioeconomic History    Marital status: Single   Tobacco Use    Smoking status: Former    Smokeless tobacco: Never   Substance and Sexual Activity    Alcohol use: Yes     Comment: socially    Drug use: Not Currently     Types: Marijuana, Methamphetamines, Amphetamines, "Crack" cocaine, Heroin    Sexual activity: Yes     Partners: Male     Birth control/protection: See Surgical Hx        Current Outpatient Medications:     buPROPion (WELLBUTRIN XL) 150 MG TB24 tablet, Take 150 mg by mouth., Disp: , Rfl:     busPIRone (BUSPAR) 10 MG tablet, Take 10 mg by mouth 3 (three) times daily., Disp: , Rfl:     ibuprofen (ADVIL,MOTRIN) 600 MG tablet, Take 1 tablet (600 mg total) by mouth every 8 (eight) hours as needed for Pain., Disp: 20 tablet, " Rfl: 0    ondansetron (ZOFRAN-ODT) 8 MG TbDL, Take 1 tablet (8 mg total) by mouth 3 (three) times daily as needed (nausea and vomiting)., Disp: 20 tablet, Rfl: 0    paroxetine (PAXIL) 40 MG tablet, Take 40 mg by mouth., Disp: , Rfl:     promethazine (PHENERGAN) 25 MG suppository, Place 1 suppository (25 mg total) rectally every 6 (six) hours as needed for Nausea., Disp: 30 suppository, Rfl: 1    ALPRAZolam (XANAX) 0.5 MG tablet, Take 0.5 mg by mouth 3 (three) times daily. (Patient not taking: Reported on 10/11/2024), Disp: , Rfl:     OLANZapine (ZYPREXA) 10 MG tablet, Take 10 mg by mouth every evening. (Patient not taking: Reported on 10/11/2024), Disp: , Rfl:     OLANZapine (ZYPREXA) 20 MG tablet, Take 20 mg by mouth. (Patient not taking: Reported on 10/11/2024), Disp: , Rfl:     temazepam (RESTORIL) 15 mg Cap, Take 15 mg by mouth every evening. (Patient not taking: Reported on 10/11/2024), Disp: , Rfl:     traZODone (DESYREL) 100 MG tablet, Take 100 mg by mouth nightly as needed. (Patient not taking: Reported on 10/11/2024), Disp: , Rfl:     VRAYLAR 1.5 mg Cap, Take 1.5 mg by mouth Daily. (Patient not taking: Reported on 10/11/2024), Disp: , Rfl:    Review of patient's allergies indicates:  No Known Allergies         Review of Systems   Skin:         Small wound lateral aspect of the left breast from a stereotactic biopsy otherwise no palpable abnormalities appreciated on exam        I have reviewed the following:     Details / Date    []   Labs     []   Micro     []   Pathology     [x]   Imaging Images reviewed as well as discussion with the radiologist    []   Cardiology Procedures     []   Other         Objective         Physical Exam  Vitals and nursing note reviewed. Exam conducted with a chaperone present.   Constitutional:       Appearance: Normal appearance. She is normal weight.   HENT:      Head: Normocephalic and atraumatic.      Mouth/Throat:      Mouth: Mucous membranes are moist.   Eyes:       Extraocular Movements: Extraocular movements intact.      Conjunctiva/sclera: Conjunctivae normal.      Pupils: Pupils are equal, round, and reactive to light.   Cardiovascular:      Rate and Rhythm: Normal rate.      Pulses: Normal pulses.   Pulmonary:      Effort: Pulmonary effort is normal.   Abdominal:      General: Abdomen is flat.      Palpations: Abdomen is soft.   Musculoskeletal:         General: Normal range of motion.      Cervical back: Normal range of motion and neck supple.   Skin:     General: Skin is warm.      Comments: Left breast exam no appreciable palpable masses or abnormalities appreciated.  Small punctate wound healing nicely   Neurological:      General: No focal deficit present.      Mental Status: She is alert and oriented to person, place, and time. Mental status is at baseline.   Psychiatric:         Mood and Affect: Mood normal.          Assessment and Plan     1. Intraductal papilloma  -     Ambulatory referral/consult to General Surgery  -     Case Request Operating Room: EXCISION, LESION, BREAST, WITH NEEDLE LOCALIZATION    Other orders  -     Vital signs; Standing  -     Insert peripheral IV; Standing  -     Diet NPO; Standing  -     0.9%  NaCl infusion  -     IP VTE LOW RISK PATIENT; Standing  -     Full code; Standing  -     Place in Outpatient; Standing  -     Place ANNALISE hose; Standing  -     ceFAZolin 2 g in D5W 50 mL IVPB (MB+)     As thorough review of films and discussion with Radiology with area of intraductal papilloma needs to be completely excised to rule out malignant potential versus early abnormal cells.  Proximally 10% of these to 20% can have malignant potential.  Area of architectural distortion needs to be re-evaluated with a with a MRI in 6 months.  This was discussed thoroughly with the patient states he understands and wishes to proceed.  Plan needle needle localized breast biopsy left side outpatient surgery  Orders Placed This Encounter   Procedures    Insert  peripheral IV        An After Visit Summary was printed and given to the patient.       Moses Arciniega MD  [facility] 2nd Floor General Surgery  [address] [place] [address]  [phone]     This note was created using [employer] direct voice recognition software. Note may have occasional typographical errors that may not have been identified and edited despite initial review prior to signing.     Patient instructed that best way to communicate with my office staff is for patient to get on the [facility] epic patient portal to expedite communication and communication issues that may occur.  Patient was given instructions on how to get on the portal.  I encouraged patient to obtain portal access as well.  Ultimately it is up to the patient to obtain access.  Patient voiced understanding.

## 2024-10-14 DIAGNOSIS — R92.8 ABNORMAL MAMMOGRAM: ICD-10-CM

## 2024-10-14 DIAGNOSIS — D36.9 INTRADUCTAL PAPILLOMA: Primary | ICD-10-CM

## 2024-10-14 DIAGNOSIS — N63.20 MASS OF LEFT BREAST, UNSPECIFIED QUADRANT: Primary | ICD-10-CM

## 2024-10-15 ENCOUNTER — ANESTHESIA EVENT (OUTPATIENT)
Dept: SURGERY | Facility: HOSPITAL | Age: 39
End: 2024-10-15
Payer: MEDICARE

## 2024-10-15 NOTE — ANESTHESIA PREPROCEDURE EVALUATION
10/15/2024  Eleno Kiran is a 39 y.o., female.      Pre-op Assessment    I have reviewed the Patient Summary Reports.     I have reviewed the Nursing Notes. I have reviewed the NPO Status.   I have reviewed the Medications.     Review of Systems  Anesthesia Hx:  No problems with previous Anesthesia  C/S x 3  [place]  Cysto w stone/stent-several--easy LMA  LTX  ? Abdominal procedure  BTL           Denies Family Hx of Anesthesia complications.    Denies Personal Hx of Anesthesia complications.                    Social:  Recreational Drugs, Former Smoker Smoking in past-amount and duration unclear  H/o poly substance abuse.  None now per chart      Hematology/Oncology:  Hematology Normal                     Current/Recent Cancer.  Breast    left          EENT/Dental:  EENT/Dental Normal           Cardiovascular:  Cardiovascular Normal                  ECG has been reviewed. SR 96, RAD, lead reversal                           Pulmonary:  Pulmonary Normal                       Renal/:  Chronic Renal Disease   H/o stones             Hepatic/GI:  Hepatic/GI Normal                    Musculoskeletal:  Musculoskeletal Normal    H/o non-traumatic rhabdo in past  Recent L ankle fx-closed reduction in ED            Neurological:  Neurology Normal                                      Endocrine:  Endocrine Normal    BMU 41        Psych:   anxiety depression schizophrenia             Physical Exam  General: Well nourished, Cooperative, Alert and Oriented    Airway:  Mallampati: II   Mouth Opening: Normal  Neck ROM: Normal ROM    Dental:  Intact    Chest/Lungs:  Clear to auscultation, Normal Respiratory Rate    Heart:  Rate: Normal  Rhythm: Regular Rhythm    Anesthesia Plan  Type of Anesthesia, risks & benefits discussed:    Anesthesia Type: Gen Supraglottic Airway, Gen ETT  Intra-op Monitoring Plan: Standard  ASA Monitors  Post Op Pain Control Plan: IV/PO Opioids PRN  Induction:  IV  Informed Consent: Informed consent signed with the Patient and all parties understand the risks and agree with anesthesia plan.  All questions answered.   ASA Score: 2  Day of Surgery Review of History & Physical: H&P Update referred to the surgeon/provider.    Ready For Surgery From Anesthesia Perspective.   .

## 2024-10-21 ENCOUNTER — TELEPHONE (OUTPATIENT)
Dept: SURGERY | Facility: HOSPITAL | Age: 39
End: 2024-10-21
Payer: MEDICARE

## 2024-10-21 ENCOUNTER — ANESTHESIA (OUTPATIENT)
Dept: SURGERY | Facility: HOSPITAL | Age: 39
End: 2024-10-21
Payer: MEDICARE

## 2024-10-21 NOTE — TELEPHONE ENCOUNTER
Called patient this morning at 0645 to inform her that her surgery for today had not been approved by her insurance. Informed her we would notify her as soon as it does. Patient verbalized understanding.

## 2024-10-23 ENCOUNTER — HOSPITAL ENCOUNTER (OUTPATIENT)
Dept: RADIOLOGY | Facility: HOSPITAL | Age: 39
Discharge: HOME | End: 2024-10-23
Attending: SPECIALIST | Admitting: SPECIALIST
Payer: MEDICARE

## 2024-10-23 ENCOUNTER — HOSPITAL ENCOUNTER (OUTPATIENT)
Facility: HOSPITAL | Age: 39
Discharge: HOME | End: 2024-10-23
Attending: SPECIALIST | Admitting: SPECIALIST
Payer: MEDICARE

## 2024-10-23 VITALS
TEMPERATURE: 98 F | WEIGHT: 251 LBS | HEART RATE: 81 BPM | DIASTOLIC BLOOD PRESSURE: 65 MMHG | SYSTOLIC BLOOD PRESSURE: 106 MMHG | RESPIRATION RATE: 12 BRPM | BODY MASS INDEX: 40.34 KG/M2 | HEIGHT: 66 IN | OXYGEN SATURATION: 97 %

## 2024-10-23 VITALS — WEIGHT: 251 LBS | BODY MASS INDEX: 40.34 KG/M2 | HEIGHT: 66 IN

## 2024-10-23 DIAGNOSIS — N63.20 MASS OF LEFT BREAST, UNSPECIFIED QUADRANT: ICD-10-CM

## 2024-10-23 DIAGNOSIS — R92.8 ABNORMAL MAMMOGRAM: ICD-10-CM

## 2024-10-23 DIAGNOSIS — D36.9 INTRADUCTAL PAPILLOMA: Primary | ICD-10-CM

## 2024-10-23 PROCEDURE — 19285 PERQ DEV BREAST 1ST US IMAG: CPT | Mod: LT | Performed by: RADIOLOGY

## 2024-10-23 PROCEDURE — 88342 IMHCHEM/IMCYTCHM 1ST ANTB: CPT | Performed by: PATHOLOGY

## 2024-10-23 PROCEDURE — 71000033 HC RECOVERY, INTIAL HOUR: Performed by: SPECIALIST

## 2024-10-23 PROCEDURE — 63600175 PHARM REV CODE 636 W HCPCS: Mod: JG,UD | Performed by: SPECIALIST

## 2024-10-23 PROCEDURE — 19125 EXCISION BREAST LESION: CPT | Mod: LT,,, | Performed by: SPECIALIST

## 2024-10-23 PROCEDURE — 37000009 HC ANESTHESIA EA ADD 15 MINS: Performed by: SPECIALIST

## 2024-10-23 PROCEDURE — 88342 IMHCHEM/IMCYTCHM 1ST ANTB: CPT | Mod: 26,,, | Performed by: PATHOLOGY

## 2024-10-23 PROCEDURE — 63600175 PHARM REV CODE 636 W HCPCS: Performed by: SPECIALIST

## 2024-10-23 PROCEDURE — 76098 X-RAY EXAM SURGICAL SPECIMEN: CPT | Mod: TC

## 2024-10-23 PROCEDURE — 71000015 HC POSTOP RECOV 1ST HR: Performed by: SPECIALIST

## 2024-10-23 PROCEDURE — 88307 TISSUE EXAM BY PATHOLOGIST: CPT | Performed by: PATHOLOGY

## 2024-10-23 PROCEDURE — 63600175 PHARM REV CODE 636 W HCPCS: Performed by: ANESTHESIOLOGY

## 2024-10-23 PROCEDURE — 27200940 US GUIDED NEEDLE PLACEMENT

## 2024-10-23 PROCEDURE — 88307 TISSUE EXAM BY PATHOLOGIST: CPT | Mod: 26,,, | Performed by: PATHOLOGY

## 2024-10-23 PROCEDURE — 63600175 PHARM REV CODE 636 W HCPCS: Performed by: NURSE ANESTHETIST, CERTIFIED REGISTERED

## 2024-10-23 PROCEDURE — 37000008 HC ANESTHESIA 1ST 15 MINUTES: Performed by: SPECIALIST

## 2024-10-23 PROCEDURE — 36000707: Performed by: SPECIALIST

## 2024-10-23 PROCEDURE — 63600175 PHARM REV CODE 636 W HCPCS: Mod: JZ,JG,UD | Performed by: NURSE ANESTHETIST, CERTIFIED REGISTERED

## 2024-10-23 PROCEDURE — 77061 BREAST TOMOSYNTHESIS UNI: CPT | Mod: TC,LT

## 2024-10-23 PROCEDURE — C1769 GUIDE WIRE: HCPCS

## 2024-10-23 PROCEDURE — 36000706: Performed by: SPECIALIST

## 2024-10-23 RX ORDER — OXYCODONE HYDROCHLORIDE 5 MG/1
5 TABLET ORAL ONCE AS NEEDED
Status: DISCONTINUED | OUTPATIENT
Start: 2024-10-23 | End: 2024-10-23 | Stop reason: HOSPADM

## 2024-10-23 RX ORDER — GLUCAGON 1 MG
1 KIT INJECTION
Status: DISCONTINUED | OUTPATIENT
Start: 2024-10-23 | End: 2024-10-23 | Stop reason: HOSPADM

## 2024-10-23 RX ORDER — SODIUM CHLORIDE, SODIUM LACTATE, POTASSIUM CHLORIDE, CALCIUM CHLORIDE 600; 310; 30; 20 MG/100ML; MG/100ML; MG/100ML; MG/100ML
INJECTION, SOLUTION INTRAVENOUS CONTINUOUS
Status: DISCONTINUED | OUTPATIENT
Start: 2024-10-23 | End: 2024-10-23 | Stop reason: HOSPADM

## 2024-10-23 RX ORDER — OXYCODONE AND ACETAMINOPHEN 5; 325 MG/1; MG/1
1 TABLET ORAL EVERY 4 HOURS PRN
Qty: 15 TABLET | Refills: 0 | Status: SHIPPED | OUTPATIENT
Start: 2024-10-23

## 2024-10-23 RX ORDER — SODIUM CHLORIDE 9 MG/ML
INJECTION, SOLUTION INTRAVENOUS CONTINUOUS
Status: DISCONTINUED | OUTPATIENT
Start: 2024-10-23 | End: 2024-10-23 | Stop reason: HOSPADM

## 2024-10-23 RX ORDER — MIDAZOLAM HYDROCHLORIDE 1 MG/ML
INJECTION INTRAMUSCULAR; INTRAVENOUS
Status: DISCONTINUED | OUTPATIENT
Start: 2024-10-23 | End: 2024-10-23

## 2024-10-23 RX ORDER — LIDOCAINE HYDROCHLORIDE 10 MG/ML
1 INJECTION, SOLUTION EPIDURAL; INFILTRATION; INTRACAUDAL; PERINEURAL ONCE
Status: DISCONTINUED | OUTPATIENT
Start: 2024-10-23 | End: 2024-10-23 | Stop reason: HOSPADM

## 2024-10-23 RX ORDER — SODIUM CHLORIDE, SODIUM LACTATE, POTASSIUM CHLORIDE, CALCIUM CHLORIDE 600; 310; 30; 20 MG/100ML; MG/100ML; MG/100ML; MG/100ML
125 INJECTION, SOLUTION INTRAVENOUS CONTINUOUS
Status: DISCONTINUED | OUTPATIENT
Start: 2024-10-23 | End: 2024-10-23 | Stop reason: HOSPADM

## 2024-10-23 RX ORDER — LIDOCAINE HYDROCHLORIDE 20 MG/ML
INJECTION, SOLUTION EPIDURAL; INFILTRATION; INTRACAUDAL; PERINEURAL
Status: DISCONTINUED | OUTPATIENT
Start: 2024-10-23 | End: 2024-10-23

## 2024-10-23 RX ORDER — FENTANYL CITRATE 50 UG/ML
INJECTION, SOLUTION INTRAMUSCULAR; INTRAVENOUS
Status: DISCONTINUED | OUTPATIENT
Start: 2024-10-23 | End: 2024-10-23

## 2024-10-23 RX ORDER — CEFAZOLIN 2 G/1
2 INJECTION, POWDER, FOR SOLUTION INTRAMUSCULAR; INTRAVENOUS
Status: COMPLETED | OUTPATIENT
Start: 2024-10-23 | End: 2024-10-23

## 2024-10-23 RX ORDER — MIDAZOLAM HYDROCHLORIDE 1 MG/ML
1 INJECTION, SOLUTION INTRAMUSCULAR; INTRAVENOUS ONCE
Status: DISCONTINUED | OUTPATIENT
Start: 2024-10-23 | End: 2024-10-23 | Stop reason: HOSPADM

## 2024-10-23 RX ORDER — MEPERIDINE HYDROCHLORIDE 50 MG/ML
12.5 INJECTION INTRAMUSCULAR; INTRAVENOUS; SUBCUTANEOUS EVERY 10 MIN PRN
Status: DISCONTINUED | OUTPATIENT
Start: 2024-10-23 | End: 2024-10-23 | Stop reason: HOSPADM

## 2024-10-23 RX ORDER — DEXAMETHASONE SODIUM PHOSPHATE 4 MG/ML
INJECTION, SOLUTION INTRA-ARTICULAR; INTRALESIONAL; INTRAMUSCULAR; INTRAVENOUS; SOFT TISSUE
Status: DISCONTINUED | OUTPATIENT
Start: 2024-10-23 | End: 2024-10-23

## 2024-10-23 RX ORDER — HYDROMORPHONE HYDROCHLORIDE 1 MG/ML
INJECTION, SOLUTION INTRAMUSCULAR; INTRAVENOUS; SUBCUTANEOUS
Status: DISCONTINUED | OUTPATIENT
Start: 2024-10-23 | End: 2024-10-23

## 2024-10-23 RX ORDER — ACETAMINOPHEN 10 MG/ML
INJECTION, SOLUTION INTRAVENOUS
Status: DISCONTINUED | OUTPATIENT
Start: 2024-10-23 | End: 2024-10-23

## 2024-10-23 RX ORDER — METHYLENE BLUE 5 MG/ML
25 INJECTION INTRAVENOUS ONCE
Status: COMPLETED | OUTPATIENT
Start: 2024-10-23 | End: 2024-10-23

## 2024-10-23 RX ORDER — HYDROMORPHONE HYDROCHLORIDE 1 MG/ML
0.5 INJECTION, SOLUTION INTRAMUSCULAR; INTRAVENOUS; SUBCUTANEOUS EVERY 5 MIN PRN
Status: DISCONTINUED | OUTPATIENT
Start: 2024-10-23 | End: 2024-10-23 | Stop reason: HOSPADM

## 2024-10-23 RX ORDER — ONDANSETRON HYDROCHLORIDE 2 MG/ML
4 INJECTION, SOLUTION INTRAVENOUS DAILY PRN
Status: DISCONTINUED | OUTPATIENT
Start: 2024-10-23 | End: 2024-10-23 | Stop reason: HOSPADM

## 2024-10-23 RX ORDER — PROPOFOL 10 MG/ML
VIAL (ML) INTRAVENOUS
Status: DISCONTINUED | OUTPATIENT
Start: 2024-10-23 | End: 2024-10-23

## 2024-10-23 RX ORDER — OXYCODONE HYDROCHLORIDE 5 MG/1
10 TABLET ORAL EVERY 4 HOURS PRN
Status: DISCONTINUED | OUTPATIENT
Start: 2024-10-23 | End: 2024-10-23 | Stop reason: HOSPADM

## 2024-10-23 RX ORDER — ONDANSETRON HYDROCHLORIDE 2 MG/ML
INJECTION, SOLUTION INTRAVENOUS
Status: DISCONTINUED | OUTPATIENT
Start: 2024-10-23 | End: 2024-10-23

## 2024-10-23 RX ADMIN — ONDANSETRON 4 MG: 2 INJECTION INTRAMUSCULAR; INTRAVENOUS at 12:10

## 2024-10-23 RX ADMIN — CEFAZOLIN 2 G: 2 INJECTION, POWDER, FOR SOLUTION INTRAMUSCULAR; INTRAVENOUS at 12:10

## 2024-10-23 RX ADMIN — METHYLENE BLUE 5 MG: 5 INJECTION INTRAVENOUS at 09:10

## 2024-10-23 RX ADMIN — PROPOFOL 200 MG: 10 INJECTION, EMULSION INTRAVENOUS at 12:10

## 2024-10-23 RX ADMIN — ACETAMINOPHEN 1000 MG: 10 INJECTION INTRAVENOUS at 12:10

## 2024-10-23 RX ADMIN — HYDROMORPHONE HYDROCHLORIDE 1 MG: 1 INJECTION, SOLUTION INTRAMUSCULAR; INTRAVENOUS; SUBCUTANEOUS at 12:10

## 2024-10-23 RX ADMIN — SODIUM CHLORIDE, POTASSIUM CHLORIDE, SODIUM LACTATE AND CALCIUM CHLORIDE: 600; 310; 30; 20 INJECTION, SOLUTION INTRAVENOUS at 10:10

## 2024-10-23 RX ADMIN — MIDAZOLAM HYDROCHLORIDE 2 MG: 1 INJECTION, SOLUTION INTRAMUSCULAR; INTRAVENOUS at 10:10

## 2024-10-23 RX ADMIN — HYDROMORPHONE HYDROCHLORIDE 0.5 MG: 1 INJECTION, SOLUTION INTRAMUSCULAR; INTRAVENOUS; SUBCUTANEOUS at 01:10

## 2024-10-23 RX ADMIN — FENTANYL CITRATE 100 MCG: 50 INJECTION INTRAMUSCULAR; INTRAVENOUS at 12:10

## 2024-10-23 RX ADMIN — LIDOCAINE HYDROCHLORIDE 50 MG: 20 INJECTION, SOLUTION EPIDURAL; INFILTRATION; INTRACAUDAL; PERINEURAL at 12:10

## 2024-10-23 RX ADMIN — DEXAMETHASONE SODIUM PHOSPHATE 4 MG: 4 INJECTION INTRA-ARTICULAR; INTRALESIONAL; INTRAMUSCULAR; INTRAVENOUS; SOFT TISSUE at 12:10

## 2024-10-23 NOTE — ANESTHESIA PROCEDURE NOTES
Intubation    Date/Time: 10/23/2024 12:13 PM    Performed by: Kim Schumacher CRNA  Authorized by: Honorio Najera MD    Intubation:     Induction:  Intravenous    Intubated:  Postinduction    Mask Ventilation:  Easy mask    Attempts:  1    Attempted By:  CRNA    Difficult Airway Encountered?: No      Complications:  None    Airway Device:  Supraglottic airway/LMA    Airway Device Size:  4.5    Secured at:  The lips    Placement Verified By:  Capnometry    Complicating Factors:  None    Findings Post-Intubation:  BS equal bilateral

## 2024-10-23 NOTE — OP NOTE
Patient: Eleno Kiran     Date of Procedure: 10/23/2024    Procedure:  Left needle localized breast biopsy    Surgeon: Moses Arciniega MD    Assistant: None    Pre-op Diagnosis: Abnormal mammogram [R92.8] left breast    Post-op Diagnosis: Abnormal mammogram [R92.8] left breast    Procedure in Detail:  After informed consent was obtained, consent form signed, and questions answered, the surgical site was identified and marked appropriately.  The patient was then taken to the operating room where general LMA anesthesia was induced. Prophylactic IV antibiotics were administered.  The patient was positioned and the surgical field was prepped and draped in the usual sterile fashion. A thorough time-out procedure was performed with the surgical team.    Guidewire was noted left breast superiorly.  Curvilinear incision was made superior areolar region taken to the skin subcutaneous tissue and the aforementioned K-wire was brought up into the operative wound.  Circumferential dissection of the K-wire was carried out to the aforementioned lesion was excised in its entirety and sent Radiology for documentation of the lesion within the specimen.  Once this was performed and documentation of the lesion within the specimen was confirmed by the radiologist.  Specimen was passed on to pathology.  Hemostasis was achieved in the wound with cautery.  Wound was irrigated with saline hemostasis was once again noted.  Wound was closed interrupted 3-0 Vicryl deep and a running 4-0 Monocryl in subcuticular layer.  Sterile dressings were applied.  Patient was brought to the recovery room extubated in hemodynamically stable condition.  At the end the procedure all needle and sponge counts were correct x2    Dressings were applied and the patient was awakened and transferred to the recovery room in stable condition. There were no immediate complications.    Specimen:  Passed off and sent to pathology for evaluation    EBL:  Less than 5  cc    Complications: None    This note was created using [employer] direct voice recognition software. Note may have occasional typographical errors that may not have been identified and edited despite initial review prior to signing.

## 2024-10-23 NOTE — ANESTHESIA POSTPROCEDURE EVALUATION
Anesthesia Post Evaluation    Patient: Eleno Kiran    Procedure(s) Performed: Procedure(s) (LRB):  EXCISION, LESION, BREAST, WITH NEEDLE LOCALIZATION (Left)    Final Anesthesia Type: general      Patient location during evaluation: PACU  Patient participation: Yes- Able to Participate  Level of consciousness: awake and alert and oriented  Post-procedure vital signs: reviewed and stable  Pain management: adequate  Airway patency: patent    PONV status at discharge: No PONV  Anesthetic complications: no      Cardiovascular status: blood pressure returned to baseline and hemodynamically stable  Respiratory status: spontaneous ventilation and room air  Hydration status: euvolemic  Follow-up not needed.            Vitals Value Taken Time   /61 10/23/24 1331   Temp 97 10/23/24 1345   Pulse 87 10/23/24 1344   Resp 11 10/23/24 1344   SpO2 95 % 10/23/24 1344   Vitals shown include unfiled device data.      No case tracking events are documented in the log.      Pain/Lamont Score: Pain Rating Prior to Med Admin: 7 (10/23/2024  1:36 PM)  Lamont Score: 8 (10/23/2024  1:27 PM)

## 2024-10-23 NOTE — TRANSFER OF CARE
"Anesthesia Transfer of Care Note    Patient: Eleno Kiran    Procedure(s) Performed: Procedure(s) (LRB):  EXCISION, LESION, BREAST, WITH NEEDLE LOCALIZATION (Left)    Patient location: PACU    Anesthesia Type: general    Transport from OR: Transported from OR on room air with adequate spontaneous ventilation    Post pain: adequate analgesia    Post assessment: no apparent anesthetic complications and tolerated procedure well    Post vital signs: stable    Level of consciousness: awake, alert and oriented    Nausea/Vomiting: no nausea/vomiting    Complications: none    Transfer of care protocol was followed    Last vitals: Visit Vitals  BP (!) 142/97   Pulse 70   Temp 36.7 °C (98 °F) (Temporal)   Resp (!) 8   Ht 5' 6" (1.676 m)   Wt 113.9 kg (251 lb)   SpO2 99%   Breastfeeding No   BMI 40.51 kg/m²     "

## 2024-10-23 NOTE — DISCHARGE SUMMARY
[facility]  Discharge Note  Short Stay    Procedure(s) (LRB):  EXCISION, LESION, BREAST, WITH NEEDLE LOCALIZATION (Left)      OUTCOME: Patient tolerated treatment/procedure well without complication and is now ready for discharge.    DISPOSITION: Home or Self Care    FINAL DIAGNOSIS:  Left breast abnormality noted at mammography    FOLLOWUP: In clinic in 10 days    DISCHARGE INSTRUCTIONS:  No discharge procedures on file.     TIME SPENT ON DISCHARGE:  10 minutes

## 2024-10-23 NOTE — DISCHARGE INSTRUCTIONS
[facility]   [phone]  [facility]      [phone]    Post-op instructions:  Do not lift anything over 10 pounds. No pushing, no pulling, no bending or no strenuous exercise until released by doctor.    You may remove the dressings in 48 hours.  You may shower once the dressings are removed.  Do not submerge in any water, take a tub bath or go swimming etc. until released by Dr. Arciniega.   Keep surgical areas clean and dry. You may re-bandage areas as needed.  Wear the post-surgical bra for 2 weeks as instructed by Dr. Arciniega.

## 2024-10-31 LAB
FINAL PATHOLOGIC DIAGNOSIS: NORMAL
GROSS: NORMAL
Lab: NORMAL

## 2024-11-19 ENCOUNTER — PATIENT MESSAGE (OUTPATIENT)
Dept: RESEARCH | Facility: HOSPITAL | Age: 39
End: 2024-11-19
Payer: MEDICARE

## 2024-11-22 ENCOUNTER — PATIENT MESSAGE (OUTPATIENT)
Dept: RESEARCH | Facility: HOSPITAL | Age: 39
End: 2024-11-22
Payer: MEDICARE

## (undated) DEVICE — GLOVE SURG ULTRA TOUCH 7.5

## (undated) DEVICE — GLOVE SENSICARE PI GRN 7

## (undated) DEVICE — EXTRACTOR TIPLESS 3FR 115 CM

## (undated) DEVICE — SUT MCRYL PLUS 4-0 PS2 27IN

## (undated) DEVICE — GOWN B1 X-LG X-LONG

## (undated) DEVICE — CANISTER SUCTION RIGID 3000CC

## (undated) DEVICE — SCRUB HIBICLENS 4% CHG 4OZ

## (undated) DEVICE — PENCIL ZIP PEN SMOKE EVAC 10FT

## (undated) DEVICE — SEE MEDLINE ITEM 152487

## (undated) DEVICE — GLOVE SENSICARE PI SURG 7

## (undated) DEVICE — CONTAINER SPECIMEN STRL 4OZ

## (undated) DEVICE — Device

## (undated) DEVICE — SEE MEDLINE ITEM 157117

## (undated) DEVICE — SET IRR URLGY 2LINE UNIV SPIKE

## (undated) DEVICE — SEE MEDLINE ITEM 157116

## (undated) DEVICE — SEE MEDLINE ITEM 157137

## (undated) DEVICE — ELECTRODE REM PLYHSV RETURN 9

## (undated) DEVICE — ADHESIVE DERMABOND ADVANCED

## (undated) DEVICE — SOL IRR NACL .9% 3000ML

## (undated) DEVICE — CATH URET OPEN END 4.8X8F 70CM

## (undated) DEVICE — GAUZE SPONGE BULKEE 6X6.75IN

## (undated) DEVICE — BAG LINGEMAN DRAIN UROLOGY

## (undated) DEVICE — FIBER LASER HOLMIUM 273 MICRON

## (undated) DEVICE — SLEEVE SCD EXPRESS CALF MEDIUM

## (undated) DEVICE — SPONGE SUPER KERLIX 6X6.75IN

## (undated) DEVICE — GUIDE WIRE MOTION .035 X 150CM

## (undated) DEVICE — LUBRICANT SURGILUBE 2 OZ

## (undated) DEVICE — SEE MEDLINE ITEM 157185

## (undated) DEVICE — SUT CTD VICRYL 3-0 CR/SH

## (undated) DEVICE — GLOVE BIOGEL ECLIPSE SZ 7.5

## (undated) DEVICE — SYR ONLY LUER LOCK 20CC

## (undated) DEVICE — GLOVE SENSICARE PI SURG 6.5